# Patient Record
Sex: FEMALE | Race: WHITE | NOT HISPANIC OR LATINO | Employment: FULL TIME | URBAN - METROPOLITAN AREA
[De-identification: names, ages, dates, MRNs, and addresses within clinical notes are randomized per-mention and may not be internally consistent; named-entity substitution may affect disease eponyms.]

---

## 2017-01-12 ENCOUNTER — TELEPHONE (OUTPATIENT)
Dept: OBSTETRICS AND GYNECOLOGY | Facility: CLINIC | Age: 42
End: 2017-01-12

## 2017-03-05 DIAGNOSIS — G47.00 INSOMNIA: ICD-10-CM

## 2017-03-06 RX ORDER — ZOLPIDEM TARTRATE 5 MG/1
TABLET ORAL
Qty: 30 TABLET | Refills: 0 | OUTPATIENT
Start: 2017-03-06

## 2017-08-07 ENCOUNTER — OFFICE VISIT (OUTPATIENT)
Dept: INTERNAL MEDICINE | Facility: CLINIC | Age: 42
End: 2017-08-07
Attending: FAMILY MEDICINE
Payer: COMMERCIAL

## 2017-08-07 VITALS
HEIGHT: 67 IN | WEIGHT: 130.94 LBS | SYSTOLIC BLOOD PRESSURE: 100 MMHG | DIASTOLIC BLOOD PRESSURE: 72 MMHG | BODY MASS INDEX: 20.55 KG/M2 | HEART RATE: 88 BPM

## 2017-08-07 DIAGNOSIS — F07.81 POST CONCUSSION SYNDROME: ICD-10-CM

## 2017-08-07 DIAGNOSIS — F41.1 GAD (GENERALIZED ANXIETY DISORDER): ICD-10-CM

## 2017-08-07 DIAGNOSIS — N30.00 ACUTE CYSTITIS WITHOUT HEMATURIA: Primary | ICD-10-CM

## 2017-08-07 PROCEDURE — 87186 SC STD MICRODIL/AGAR DIL: CPT

## 2017-08-07 PROCEDURE — 99999 PR PBB SHADOW E&M-EST. PATIENT-LVL III: CPT | Mod: PBBFAC,,, | Performed by: FAMILY MEDICINE

## 2017-08-07 PROCEDURE — 87086 URINE CULTURE/COLONY COUNT: CPT

## 2017-08-07 PROCEDURE — 87088 URINE BACTERIA CULTURE: CPT

## 2017-08-07 PROCEDURE — 87077 CULTURE AEROBIC IDENTIFY: CPT

## 2017-08-07 PROCEDURE — 99213 OFFICE O/P EST LOW 20 MIN: CPT | Mod: S$GLB,,, | Performed by: FAMILY MEDICINE

## 2017-08-07 PROCEDURE — 3008F BODY MASS INDEX DOCD: CPT | Mod: S$GLB,,, | Performed by: FAMILY MEDICINE

## 2017-08-07 RX ORDER — ZOLPIDEM TARTRATE 5 MG/1
5 TABLET ORAL NIGHTLY PRN
Qty: 30 TABLET | Refills: 3 | Status: SHIPPED | OUTPATIENT
Start: 2017-08-07 | End: 2018-03-14 | Stop reason: SDUPTHER

## 2017-08-07 RX ORDER — SPIRONOLACTONE 50 MG/1
TABLET, FILM COATED ORAL
COMMUNITY
Start: 2017-07-16 | End: 2018-12-21

## 2017-08-07 RX ORDER — SPIRONOLACTONE 50 MG/1
50 TABLET, FILM COATED ORAL 3 TIMES DAILY
Qty: 90 TABLET | Refills: 0 | Status: CANCELLED | OUTPATIENT
Start: 2017-08-07

## 2017-08-07 RX ORDER — AMITRIPTYLINE HYDROCHLORIDE 50 MG/1
100 TABLET, FILM COATED ORAL NIGHTLY
Qty: 30 TABLET | Refills: 11 | Status: SHIPPED | OUTPATIENT
Start: 2017-08-07 | End: 2018-03-14 | Stop reason: SDUPTHER

## 2017-08-07 RX ORDER — NITROFURANTOIN 25; 75 MG/1; MG/1
100 CAPSULE ORAL 2 TIMES DAILY
Qty: 14 CAPSULE | Refills: 0 | Status: SHIPPED | OUTPATIENT
Start: 2017-08-07 | End: 2017-11-20

## 2017-08-07 RX ORDER — LORAZEPAM 0.5 MG/1
0.5 TABLET ORAL EVERY 12 HOURS PRN
Qty: 30 TABLET | Refills: 1 | Status: SHIPPED | OUTPATIENT
Start: 2017-08-07 | End: 2017-08-09 | Stop reason: SDUPTHER

## 2017-08-07 NOTE — PROGRESS NOTES
"CHIEF COMPLAINT: Several days of foul-smelling urine and dysuria     HISTORY OF PRESENT ILLNESS: The patient recently developed the subacute onset of foul-smelling urine and dysuria.  No back pain or hematuria.  No fever or chills.    REVIEW OF SYSTEMS:  GENERAL: No fever, chills, fatigability or weight loss.  SKIN: No rashes, itching or changes in color or texture of skin.  HEAD: The patient does not have headaches and recent head trauma.  EYES: Visual acuity fine. No photophobia, ocular pain or diplopia.  EARS: Denies ear pain, discharge or vertigo.  NOSE: No loss of smell, no epistaxis or postnasal drip.  MOUTH & THROAT: No hoarseness or change in voice. No excessive gum bleeding.  NODES: Denies swollen glands.  CHEST: Denies ROJO, cyanosis, wheezing, cough and sputum production.  CARDIOVASCULAR: Denies chest pain, PND, orthopnea or reduced exercise tolerance.  ABDOMEN: Appetite fine. No weight loss. Denies diarrhea, abdominal pain, hematemesis or blood in stool.  The patient is having some nausea and occasional vomiting.  URINARY: No flank pain or hematuria.  PERIPHERAL VASCULAR: No claudication or cyanosis.  MUSCULOSKELETAL: No joint stiffness or swelling. Denies back pain.Except as noted above.  NEUROLOGIC: No history of seizures, paralysis, alteration of gait or coordination.    SOCIAL HISTORY: Unchanged since recent note by primary care physician.    PHYSICAL EXAMINATION:   Blood pressure 100/72, pulse 88, height 5' 7" (1.702 m), weight 59.4 kg (130 lb 15.3 oz).      APPEARANCE: Well nourished, well developed, in no acute distress.    HEAD: Normocephalic, atraumatic.  EYES: PERRL. EOMI.  Conjunctivae without injection and  anicteric  EARS: TM's intact. Light reflex normal. No retraction or perforation.    NOSE: Mucosa pink. Airway clear.  MOUTH & THROAT: No tonsillar enlargement. No pharyngeal erythema or exudate. No stridor.  NECK: Supple.   NODES: No cervical, axillary or inguinal lymph node " enlargement.  ABDOMEN: Bowel sounds normal. Not distended. Soft. No tenderness or masses.  No ascites is noted.  MUSCULOSKELETAL:  There is no clubbing, cyanosis, or edema of the extremities x4.  There is full range of motion of the lumbar spine.  There is full range of motion of the extremities x4.  There is no deformity noted.    NEUROLOGIC:       [place] Coma Scale 15      Normal speech development.      Hearing normal.      Normal gait.      Motor and sensory exams grossly normal.      DTR's normal.  PSYCHIATRIC: Patient is alert and oriented x3.  Thought processes are all normal.  There is no homicidality.  There is no suicidality.  There is no evidence of psychosis.    LABORATORY/RADIOLOGY:Chart reviewed from the emergency room..    ASSESSMENT:   UTI    PLAN:    urine culture sent    Macrobid for 7 days

## 2017-08-09 DIAGNOSIS — F41.1 GAD (GENERALIZED ANXIETY DISORDER): ICD-10-CM

## 2017-08-09 LAB — BACTERIA UR CULT: NORMAL

## 2017-08-09 RX ORDER — LORAZEPAM 0.5 MG/1
0.5 TABLET ORAL EVERY 12 HOURS PRN
Qty: 30 TABLET | Refills: 1 | Status: SHIPPED | OUTPATIENT
Start: 2017-08-09 | End: 2018-03-19

## 2017-08-09 NOTE — TELEPHONE ENCOUNTER
Pt rx for ativan has 2 sets of directions. Please clarify how pt should be taking rx. Please advise/authorize?

## 2017-08-09 NOTE — TELEPHONE ENCOUNTER
"----- Message from Samara Waltersin sent at 8/9/2017 10:17 AM CDT -----  Contact: [facility] # [id number]  X  1st Request  _  2nd Request  _  3rd Request    Who: Lynne Velasco (mrn# [id number])    Why: Pharmacist called requesting a call.  Says, "directions aren't clear."  Medication / LORAZEPAM.  Please give a call back at your earliest convenience.        THANKS!    What Number to Call Back:  [phone]    When to Expect a call back: (With in 24 hours)                      "

## 2017-11-17 DIAGNOSIS — F41.1 GAD (GENERALIZED ANXIETY DISORDER): ICD-10-CM

## 2017-11-17 RX ORDER — LORAZEPAM 0.5 MG/1
0.5 TABLET ORAL EVERY 12 HOURS PRN
Qty: 30 TABLET | Refills: 1 | Status: CANCELLED | OUTPATIENT
Start: 2017-11-17

## 2017-11-17 RX ORDER — ZOLPIDEM TARTRATE 5 MG/1
5 TABLET ORAL NIGHTLY PRN
Qty: 30 TABLET | Refills: 3 | Status: CANCELLED | OUTPATIENT
Start: 2017-11-17

## 2017-11-20 ENCOUNTER — OFFICE VISIT (OUTPATIENT)
Dept: OBSTETRICS AND GYNECOLOGY | Facility: CLINIC | Age: 42
End: 2017-11-20
Attending: OBSTETRICS & GYNECOLOGY
Payer: COMMERCIAL

## 2017-11-20 VITALS
BODY MASS INDEX: 21.38 KG/M2 | HEIGHT: 67 IN | DIASTOLIC BLOOD PRESSURE: 70 MMHG | WEIGHT: 136.25 LBS | SYSTOLIC BLOOD PRESSURE: 92 MMHG

## 2017-11-20 DIAGNOSIS — Z12.31 ENCOUNTER FOR SCREENING MAMMOGRAM FOR HIGH-RISK PATIENT: ICD-10-CM

## 2017-11-20 DIAGNOSIS — R35.0 URINARY FREQUENCY: ICD-10-CM

## 2017-11-20 DIAGNOSIS — Z01.419 ENCOUNTER FOR GYNECOLOGICAL EXAMINATION WITHOUT ABNORMAL FINDING: Primary | ICD-10-CM

## 2017-11-20 LAB
BACTERIA #/AREA URNS AUTO: NORMAL /HPF
BILIRUB UR QL STRIP: NEGATIVE
CLARITY UR REFRACT.AUTO: ABNORMAL
COLOR UR AUTO: YELLOW
GLUCOSE UR QL STRIP: NEGATIVE
HGB UR QL STRIP: NEGATIVE
KETONES UR QL STRIP: ABNORMAL
LEUKOCYTE ESTERASE UR QL STRIP: NEGATIVE
MICROSCOPIC COMMENT: NORMAL
NITRITE UR QL STRIP: POSITIVE
PH UR STRIP: 5 [PH] (ref 5–8)
PROT UR QL STRIP: NEGATIVE
RBC #/AREA URNS AUTO: 1 /HPF (ref 0–4)
SP GR UR STRIP: 1.02 (ref 1–1.03)
SQUAMOUS #/AREA URNS AUTO: 1 /HPF
URN SPEC COLLECT METH UR: ABNORMAL
UROBILINOGEN UR STRIP-ACNC: NEGATIVE EU/DL
WBC #/AREA URNS AUTO: 4 /HPF (ref 0–5)

## 2017-11-20 PROCEDURE — 87086 URINE CULTURE/COLONY COUNT: CPT

## 2017-11-20 PROCEDURE — 87186 SC STD MICRODIL/AGAR DIL: CPT

## 2017-11-20 PROCEDURE — 87077 CULTURE AEROBIC IDENTIFY: CPT

## 2017-11-20 PROCEDURE — 99396 PREV VISIT EST AGE 40-64: CPT | Mod: S$GLB,,, | Performed by: OBSTETRICS & GYNECOLOGY

## 2017-11-20 PROCEDURE — 87088 URINE BACTERIA CULTURE: CPT

## 2017-11-20 PROCEDURE — 99999 PR PBB SHADOW E&M-EST. PATIENT-LVL III: CPT | Mod: PBBFAC,,, | Performed by: OBSTETRICS & GYNECOLOGY

## 2017-11-20 PROCEDURE — 81001 URINALYSIS AUTO W/SCOPE: CPT

## 2017-11-20 NOTE — PROGRESS NOTES
Subjective:       Patient ID: Lynne Velasco is a 42 y.o. female.    Chief Complaint:  Gynecologic Exam and Dysuria (urinary frequency)      History of Present Illness  HPI    Lynne Velasco is a 42 y.o. female  here for her annual GYN exam.  She reports urinary frequency for the past seven months with difficulty emptying completely.    She describes her periods as irregular, light flow, lasting 2 days.   Denies break through bleeding.   Denies vaginal itching or irritation.  Denies vaginal discharge.  She is sexually active. She has had 1 partner for the past 18 years .  She uses vasectomy for contraception.   History of abnormal pap: Yes -   Last Pap: approximate date  and was normal  Last MMG: normal--routine follow-up in 12 months  Denies domestic violence. She does feel safe at home.     Past Medical History:   Diagnosis Date    Abnormal Pap smear     cryo for dysplasia    Anxiety     Depression     History of psychiatric care     prozac, lexapro, ativan    Mental disorder     Depression    Psychiatric problem     Therapy      Past Surgical History:   Procedure Laterality Date    Breast implants 1998    Cryo       DILATION AND CURETTAGE OF UTERUS      DILATION AND CURETTAGE OF UTERUS      ENDOMETRIAL ABLATION      Hysteroscopy with Novasure endometrial ablation     Social History     Social History    Marital status:      Spouse name: N/A    Number of children: N/A    Years of education: N/A     Occupational History     [employer]     Social History Main Topics    Smoking status: Former Smoker     Packs/day: 0.50     Years: 5.00     Types: Cigarettes     Quit date: 10/15/1999    Smokeless tobacco: Never Used    Alcohol use 1.2 oz/week     2 Glasses of wine per week      Comment: social    Drug use: No    Sexual activity: Yes     Partners: Male     Birth control/ protection: Surgical, None      Comment: Spouse had Vasectomy,  " since      Other Topics Concern    Not on file     Social History Narrative    - 14 years    From [place]    Works for a law firm- - associates degree    3 children     close with mom     Family History   Problem Relation Age of Onset    Diabetes Father     Stroke Father     Diabetes Mother     Hypertension Mother     Coronary artery disease Mother     Depression Mother     Seizures Mother     Ovarian cancer Maternal Aunt     Breast cancer Maternal Aunt     Colon cancer Neg Hx     Suicide Neg Hx      OB History      Para Term  AB Living    4 3 3   1 3    SAB TAB Ectopic Multiple Live Births    1       3          BP 92/70   Ht 5' 7" (1.702 m)   Wt 61.8 kg (136 lb 3.9 oz)   LMP 10/09/2017 (Approximate)   BMI 21.34 kg/m²         GYN & OB History  Patient's last menstrual period was 10/09/2017 (approximate).   Date of Last Pap: 2016    OB History    Para Term  AB Living   4 3 3   1 3   SAB TAB Ectopic Multiple Live Births   1       3      # Outcome Date GA Lbr Homero/2nd Weight Sex Delivery Anes PTL Lv   4 SAB            3 Term    3.402 kg (7 lb 8 oz) F Vag-Spont   FATOU   2 Term    3.402 kg (7 lb 8 oz) M Vag-Spont   FATOU   1 Term    3.09 kg (6 lb 13 oz) F Vag-Spont   FATOU          Review of Systems  Review of Systems   Constitutional: Negative for activity change, appetite change, fatigue and unexpected weight change.   HENT: Negative.    Eyes: Negative for visual disturbance.   Respiratory: Negative for shortness of breath and wheezing.    Cardiovascular: Negative for chest pain, palpitations and leg swelling.   Gastrointestinal: Negative for abdominal pain, bloating and blood in stool.   Endocrine: Negative for diabetes and hair loss.   Genitourinary: Positive for frequency, urgency and dysmenorrhea. Negative for decreased libido, dyspareunia and menstrual problem.   Musculoskeletal: Negative for back pain and joint swelling.   Skin:  Negative for no " acne and hair changes.   Neurological: Negative for headaches.   Hematological: Does not bruise/bleed easily.   Psychiatric/Behavioral: Negative for depression and sleep disturbance. The patient is not nervous/anxious.    Breast: Negative for breast pain and nipple discharge          Objective:    Physical Exam:   Constitutional: She is oriented to person, place, and time. She appears well-developed and well-nourished.    HENT:   Head: Normocephalic and atraumatic.    Eyes: EOM are normal. Pupils are equal, round, and reactive to light.    Neck: Normal range of motion. Neck supple.    Cardiovascular: Normal rate and regular rhythm.     Pulmonary/Chest: Effort normal and breath sounds normal.   BREASTS: Symmetrical, no skin changes or visible lesions.  No palpable masses, nipple discharge bilaterally. Implants bilaterally          Abdominal: Soft. Bowel sounds are normal.     Genitourinary: Pelvic exam was performed with patient supine.   Genitourinary Comments: PELVIC: Normal external genitalia without lesions.  Normal hair distribution.  Adequate perineal body, normal urethral meatus.  Vagina moist and well rugated without lesions or discharge.  Cervix pink,PAROUS, PATULOUS,without lesions, discharge or tenderness.  No significant cystocele or rectocele.  Bimanual exam shows uterus to be UPPER LIMITS  normal size, regular, mobile and nontender.  Adnexa without masses or tenderness.               Musculoskeletal: Normal range of motion and moves all extremeties.       Neurological: She is alert and oriented to person, place, and time.    Skin: Skin is warm and dry.    Psychiatric: She has a normal mood and affect.          Assessment:        1. Encounter for gynecological examination without abnormal finding    2. Encounter for screening mammogram for high-risk patient    3. Urinary frequency                Plan:        1. Encounter for gynecological examination without abnormal finding  COUNSELING:  The patient was  counseled today on osteoporosis prevention, calcium supplementation, and regular weight bearing exercise. The patient was also counseled today on ACS PAP guidelines, with recommendations for yearly pelvic exams unless their uterus, cervix, and ovaries were removed for benign reasons; in that case, examinations every 3-5 years are recommended. The patient was also counseled regarding monthly breast self-examination, routine STD screening for at-risk populations, prophylactic immunizations for transmitted infections such as HPV, Pertussis, or Influenza as appropriate, and yearly mammograms when indicated by ACS guidelines. She was advised to see her primary care physician for all other health maintenance.   FOLLOW-UP with me for next routine visit.         2. Encounter for screening mammogram for high-risk patient    - Mammo Digital Screening Bilat with [facility]os[facility] CAD; Future    3. Urinary frequency  - Urine culture  - Urinalysis  - Ambulatory consult to Urogynecology       Return in about 1 year (around 11/20/2018).

## 2017-11-21 ENCOUNTER — TELEPHONE (OUTPATIENT)
Dept: UROGYNECOLOGY | Facility: CLINIC | Age: 42
End: 2017-11-21

## 2017-11-21 DIAGNOSIS — N39.0 UTI (URINARY TRACT INFECTION), UNCOMPLICATED: Primary | ICD-10-CM

## 2017-11-21 RX ORDER — NITROFURANTOIN 25; 75 MG/1; MG/1
100 CAPSULE ORAL 2 TIMES DAILY
Qty: 14 CAPSULE | Refills: 0 | Status: SHIPPED | OUTPATIENT
Start: 2017-11-21 | End: 2017-11-28

## 2017-11-21 RX ORDER — PHENAZOPYRIDINE HYDROCHLORIDE 200 MG/1
200 TABLET, FILM COATED ORAL 3 TIMES DAILY PRN
Qty: 6 TABLET | Refills: 0 | Status: SHIPPED | OUTPATIENT
Start: 2017-11-21 | End: 2017-11-23

## 2017-11-21 NOTE — TELEPHONE ENCOUNTER
----- Message from Kamla Chavez MD sent at 11/20/2017  5:12 PM CST -----  Please schedule for consult - has urinary frequency with incomplete emptying.

## 2017-11-22 LAB — BACTERIA UR CULT: NORMAL

## 2017-12-05 ENCOUNTER — TELEPHONE (OUTPATIENT)
Dept: UROGYNECOLOGY | Facility: CLINIC | Age: 42
End: 2017-12-05

## 2018-03-14 DIAGNOSIS — F51.01 PRIMARY INSOMNIA: Primary | ICD-10-CM

## 2018-03-14 DIAGNOSIS — F07.81 POST CONCUSSION SYNDROME: ICD-10-CM

## 2018-03-15 ENCOUNTER — PATIENT MESSAGE (OUTPATIENT)
Dept: INTERNAL MEDICINE | Facility: CLINIC | Age: 43
End: 2018-03-15

## 2018-03-15 RX ORDER — AMITRIPTYLINE HYDROCHLORIDE 50 MG/1
100 TABLET, FILM COATED ORAL NIGHTLY
Qty: 30 TABLET | Refills: 11 | Status: SHIPPED | OUTPATIENT
Start: 2018-03-15 | End: 2018-03-19

## 2018-03-15 RX ORDER — ZOLPIDEM TARTRATE 5 MG/1
5 TABLET ORAL NIGHTLY PRN
Qty: 30 TABLET | Refills: 3 | Status: SHIPPED | OUTPATIENT
Start: 2018-03-15 | End: 2018-12-21

## 2018-03-19 ENCOUNTER — OFFICE VISIT (OUTPATIENT)
Dept: INTERNAL MEDICINE | Facility: CLINIC | Age: 43
End: 2018-03-19
Attending: FAMILY MEDICINE
Payer: COMMERCIAL

## 2018-03-19 VITALS
OXYGEN SATURATION: 96 % | DIASTOLIC BLOOD PRESSURE: 72 MMHG | HEART RATE: 60 BPM | HEIGHT: 67 IN | WEIGHT: 135.13 LBS | BODY MASS INDEX: 21.21 KG/M2 | SYSTOLIC BLOOD PRESSURE: 110 MMHG

## 2018-03-19 DIAGNOSIS — Z00.00 PREVENTATIVE HEALTH CARE: Primary | ICD-10-CM

## 2018-03-19 DIAGNOSIS — F51.01 PRIMARY INSOMNIA: ICD-10-CM

## 2018-03-19 PROCEDURE — 99396 PREV VISIT EST AGE 40-64: CPT | Mod: S$GLB,,, | Performed by: FAMILY MEDICINE

## 2018-03-19 PROCEDURE — 99999 PR PBB SHADOW E&M-EST. PATIENT-LVL III: CPT | Mod: PBBFAC,,, | Performed by: FAMILY MEDICINE

## 2018-03-19 NOTE — PROGRESS NOTES
CHIEF COMPLAINT: Annual exam    HISTORY OF PRESENT ILLNESS: The patient is a very pleasant 42-year-old white female.  She presents to discuss completing paperwork for a concealed handgun permit.  We had an extensive discussion and chart review today.  She is on Aldactone for adult acne.  She rarely uses Ambien for sleep.  Perhaps 5 or so nights a month she uses Ambien.  She was prescribed Ativan in the distant past.  I find no evidence in the  that she has filled it in the last 2 years.  She was seen by psychiatry at one point for some reactive depression after her father's passing away.  She has not had any sort of follow-up for issues regarding that in a couple of years.  Overall I find no evidence of residual anxiety depression or other mental illness.  She is responsible in the use of her Ambien for insomnia.      REVIEW OF SYSTEMS:  GENERAL: No fever, chills, fatigability or weight loss.  SKIN: No rashes, itching or changes in color or texture of skin.  HEAD: The patient does not have headaches and recent head trauma.  EYES: Visual acuity fine. No photophobia, ocular pain or diplopia.  EARS: Denies ear pain, discharge or vertigo.  NOSE: No loss of smell, no epistaxis or postnasal drip.  MOUTH & THROAT: No hoarseness or change in voice. No excessive gum bleeding.  NODES: Denies swollen glands.  CHEST: Denies ROJO, cyanosis, wheezing, cough and sputum production.  CARDIOVASCULAR: Denies chest pain, PND, orthopnea or reduced exercise tolerance.  ABDOMEN: Appetite fine. No weight loss. Denies diarrhea, abdominal pain, hematemesis or blood in stool.    URINARY: No flank pain or hematuria.  PERIPHERAL VASCULAR: No claudication or cyanosis.  MUSCULOSKELETAL: No joint stiffness or swelling. Denies back pain.  NEUROLOGIC: No history of seizures, paralysis, alteration of gait or coordination.    SOCIAL HISTORY: Unchanged since recent note by primary care physician.    PHYSICAL EXAMINATION:   Blood pressure 110/72,  "pulse 60, height 5' 7" (1.702 m), weight 61.3 kg (135 lb 2.3 oz), SpO2 96 %.      APPEARANCE: Well nourished, well developed, in no acute distress.    HEAD: Normocephalic, atraumatic.  EYES: PERRL. EOMI.  Conjunctivae without injection and  anicteric  EARS: TM's intact. Light reflex normal. No retraction or perforation.    NOSE: Mucosa pink. Airway clear.  MOUTH & THROAT: No tonsillar enlargement. No pharyngeal erythema or exudate. No stridor.  NECK: Supple.   NODES: No cervical, axillary or inguinal lymph node enlargement.  ABDOMEN: Bowel sounds normal. Not distended. Soft. No tenderness or masses.  No ascites is noted.  MUSCULOSKELETAL:  There is no clubbing, cyanosis, or edema of the extremities x4.  There is full range of motion of the lumbar spine.  There is full range of motion of the extremities x4.  There is no deformity noted.    NEUROLOGIC:       [place] Coma Scale 15      Normal speech development.      Hearing normal.      Normal gait.      Motor and sensory exams grossly normal.      DTR's normal.  PSYCHIATRIC: Patient is alert and oriented x3.  Thought processes are all normal.  There is no homicidality.  There is no suicidality.  There is no evidence of psychosis.    LABORATORY/RADIOLOGY:Chart reviewed from the emergency room..    ASSESSMENT:   Annual exam  Depression and anxiety, resolved  Insomnia, mild    PLAN:   Overall I see no reason that she cannot be cleared medically for her concealed handgun permit.     her blood work is up-to-date.      "

## 2018-11-26 ENCOUNTER — HOSPITAL ENCOUNTER (OUTPATIENT)
Dept: RADIOLOGY | Facility: OTHER | Age: 43
Discharge: HOME | End: 2018-11-26
Attending: OBSTETRICS & GYNECOLOGY
Payer: COMMERCIAL

## 2018-11-26 ENCOUNTER — OFFICE VISIT (OUTPATIENT)
Dept: OBSTETRICS AND GYNECOLOGY | Facility: CLINIC | Age: 43
End: 2018-11-26
Attending: OBSTETRICS & GYNECOLOGY
Payer: COMMERCIAL

## 2018-11-26 VITALS
HEIGHT: 67 IN | DIASTOLIC BLOOD PRESSURE: 70 MMHG | SYSTOLIC BLOOD PRESSURE: 108 MMHG | BODY MASS INDEX: 21.35 KG/M2 | WEIGHT: 136 LBS

## 2018-11-26 VITALS — HEIGHT: 67 IN | BODY MASS INDEX: 21.35 KG/M2 | WEIGHT: 136 LBS

## 2018-11-26 DIAGNOSIS — R35.0 URINARY FREQUENCY: ICD-10-CM

## 2018-11-26 DIAGNOSIS — N94.10 FEMALE DYSPAREUNIA: ICD-10-CM

## 2018-11-26 DIAGNOSIS — Z12.4 PAP SMEAR FOR CERVICAL CANCER SCREENING: ICD-10-CM

## 2018-11-26 DIAGNOSIS — Z01.419 ENCOUNTER FOR GYNECOLOGICAL EXAMINATION WITHOUT ABNORMAL FINDING: Primary | ICD-10-CM

## 2018-11-26 DIAGNOSIS — Z12.31 SCREENING MAMMOGRAM, ENCOUNTER FOR: ICD-10-CM

## 2018-11-26 DIAGNOSIS — Z12.31 ENCOUNTER FOR SCREENING MAMMOGRAM FOR HIGH-RISK PATIENT: ICD-10-CM

## 2018-11-26 LAB
BACTERIA #/AREA URNS AUTO: ABNORMAL /HPF
BILIRUB UR QL STRIP: NEGATIVE
CLARITY UR REFRACT.AUTO: ABNORMAL
COLOR UR AUTO: YELLOW
GLUCOSE UR QL STRIP: NEGATIVE
HGB UR QL STRIP: NEGATIVE
KETONES UR QL STRIP: NEGATIVE
LEUKOCYTE ESTERASE UR QL STRIP: NEGATIVE
MICROSCOPIC COMMENT: ABNORMAL
NITRITE UR QL STRIP: NEGATIVE
PH UR STRIP: 6 [PH] (ref 5–8)
PROT UR QL STRIP: NEGATIVE
RBC #/AREA URNS AUTO: 1 /HPF (ref 0–4)
SP GR UR STRIP: 1.02 (ref 1–1.03)
SQUAMOUS #/AREA URNS AUTO: 2 /HPF
URN SPEC COLLECT METH UR: ABNORMAL
WBC #/AREA URNS AUTO: 4 /HPF (ref 0–5)

## 2018-11-26 PROCEDURE — 99396 PREV VISIT EST AGE 40-64: CPT | Mod: S$GLB,,, | Performed by: OBSTETRICS & GYNECOLOGY

## 2018-11-26 PROCEDURE — 77067 SCR MAMMO BI INCL CAD: CPT | Mod: 26,,, | Performed by: RADIOLOGY

## 2018-11-26 PROCEDURE — 87186 SC STD MICRODIL/AGAR DIL: CPT

## 2018-11-26 PROCEDURE — 87077 CULTURE AEROBIC IDENTIFY: CPT

## 2018-11-26 PROCEDURE — 87088 URINE BACTERIA CULTURE: CPT

## 2018-11-26 PROCEDURE — 99999 PR PBB SHADOW E&M-EST. PATIENT-LVL III: CPT | Mod: PBBFAC,,, | Performed by: OBSTETRICS & GYNECOLOGY

## 2018-11-26 PROCEDURE — 88141 CYTOPATH C/V INTERPRET: CPT | Mod: ,,, | Performed by: PATHOLOGY

## 2018-11-26 PROCEDURE — 87624 HPV HI-RISK TYP POOLED RSLT: CPT

## 2018-11-26 PROCEDURE — 77067 SCR MAMMO BI INCL CAD: CPT | Mod: TC

## 2018-11-26 PROCEDURE — 81001 URINALYSIS AUTO W/SCOPE: CPT

## 2018-11-26 PROCEDURE — 77063 BREAST TOMOSYNTHESIS BI: CPT | Mod: 26,,, | Performed by: RADIOLOGY

## 2018-11-26 PROCEDURE — 88175 CYTOPATH C/V AUTO FLUID REDO: CPT | Performed by: PATHOLOGY

## 2018-11-26 PROCEDURE — 87086 URINE CULTURE/COLONY COUNT: CPT

## 2018-11-26 PROCEDURE — 77063 BREAST TOMOSYNTHESIS BI: CPT | Mod: TC

## 2018-11-26 RX ORDER — MELATONIN 10 MG
CAPSULE ORAL
COMMUNITY
End: 2024-03-26

## 2018-11-26 NOTE — PROGRESS NOTES
Subjective:       Patient ID: Lynne Velasco is a 43 y.o. female.    Chief Complaint:  Well Woman and Urinary Frequency      History of Present Illness  HPI    Lynne Velasco is a 43 y.o. female  here for her annual GYN exam.  She also reports urinary frequency for several months with some difficulty with complete emptying and reports a strong urinary odor..   She describes her periods as regular, light flow, lasting 5 days. (Has occasional intermenstrual spotting).  Reports break through bleeding.   denies vaginal itching or irritation.  Denies vaginal discharge.  She is sexually active. She has had 1 partner for the past 19 years .  She uses vasectomy for contraception.   History of abnormal pap: Yes - > 20 years ago  Last Pap: approximate date  and was normal  Last MMG: normal--routine follow-up in 12 months  Last Colonoscopy:  None  denies domestic violence. She does feel safe at home.     Past Medical History:   Diagnosis Date    Abnormal Pap smear     cryo for dysplasia    Anxiety     Depression     History of psychiatric care     prozac, lexapro, ativan    Mental disorder     Depression    Psychiatric problem     Therapy      Past Surgical History:   Procedure Laterality Date    Breast implants 1998    Cryo       DILATION AND CURETTAGE OF UTERUS      DILATION AND CURETTAGE OF UTERUS      ENDOMETRIAL ABLATION      Hysteroscopy with Novasure endometrial ablation    Novasure ablation with D&C Hysteroscopy N/A 2014    Performed by Kamla Chavez MD at [facility]     Social History     Socioeconomic History    Marital status:      Spouse name: Not on file    Number of children: Not on file    Years of education: Not on file    Highest education level: Not on file   Social Needs    Financial resource strain: Not on file    Food insecurity - worry: Not on file    Food insecurity - inability: Not on file     "Transportation needs - medical: Not on file    Transportation needs - non-medical: Not on file   Occupational History     Employer: [employer]   Tobacco Use    Smoking status: Former Smoker     Packs/day: 0.50     Years: 5.00     Pack years: 2.50     Types: Cigarettes     Last attempt to quit: 10/15/1999     Years since quittin.1    Smokeless tobacco: Never Used   Substance and Sexual Activity    Alcohol use: Yes     Alcohol/week: 1.2 oz     Types: 2 Glasses of wine per week     Comment: social    Drug use: No    Sexual activity: Yes     Partners: Male     Birth control/protection: Surgical, None     Comment: Spouse had Vasectomy,  since    Other Topics Concern    Patient feels they ought to cut down on drinking/drug use Not Asked    Patient annoyed by others criticizing their drinking/drug use Not Asked    Patient has felt bad or guilty about drinking/drug use Not Asked    Patient has had a drink/used drugs as an eye opener in the AM Not Asked   Social History Narrative    - 14 years    From [place]    Works for a law firm- - associates degree    3 children     close with mom     Family History   Problem Relation Age of Onset    Diabetes Father     Stroke Father     Diabetes Mother     Hypertension Mother     Coronary artery disease Mother     Depression Mother     Seizures Mother     Ovarian cancer Maternal Aunt     Breast cancer Maternal Aunt     Colon cancer Neg Hx     Suicide Neg Hx      OB History      Para Term  AB Living    4 3 3   1 3    SAB TAB Ectopic Multiple Live Births    1       3          /70 (BP Location: Left arm)   Ht 5' 7" (1.702 m)   Wt 61.7 kg (136 lb 0.4 oz)   LMP 2018 (Exact Date)   BMI 21.30 kg/m²         GYN & OB History  Patient's last menstrual period was 2018 (exact date).   Date of Last Pap: 2016    OB History    Para Term  AB Living   4 3 3   1 3   SAB TAB Ectopic Multiple Live Births   1 "       3      # Outcome Date GA Lbr Homero/2nd Weight Sex Delivery Anes PTL Lv   4 SAB            3 Term    3.402 kg (7 lb 8 oz) F Vag-Spont   FATOU   2 Term    3.402 kg (7 lb 8 oz) M Vag-Spont   FATOU   1 Term    3.09 kg (6 lb 13 oz) F Vag-Spont   FATOU          Review of Systems  Review of Systems   Constitutional: Negative for activity change, appetite change, fatigue and unexpected weight change.   HENT: Negative.    Eyes: Negative for visual disturbance.   Respiratory: Negative for shortness of breath and wheezing.    Cardiovascular: Negative for chest pain, palpitations and leg swelling.   Gastrointestinal: Negative for abdominal pain, bloating and blood in stool.   Endocrine: Negative for diabetes and hair loss.   Genitourinary: Positive for dyspareunia and frequency. Negative for decreased libido.   Musculoskeletal: Negative for back pain and joint swelling.   Neurological: Negative for headaches.   Hematological: Does not bruise/bleed easily.   Psychiatric/Behavioral: Negative for depression and sleep disturbance. The patient is not nervous/anxious.    Breast: Negative for breast pain and nipple discharge          Objective:      Physical Exam:   Constitutional: She is oriented to person, place, and time. She appears well-developed and well-nourished.    HENT:   Head: Normocephalic and atraumatic.    Eyes: EOM are normal. Pupils are equal, round, and reactive to light.    Neck: Normal range of motion. Neck supple.    Cardiovascular: Normal rate and regular rhythm.     Pulmonary/Chest: Effort normal and breath sounds normal.   BREASTS:  no mass, no tenderness, no deformity and no retraction. Right breast exhibits no inverted nipple, no mass, no nipple discharge, no skin change, no tenderness, no bleeding and no swelling. Left breast exhibits no inverted nipple, no mass, no nipple discharge, no skin change, no tenderness, no bleeding and no swelling. Breasts are symmetrical.      Implants bilaterally.        Abdominal:  Soft. Bowel sounds are normal.     Genitourinary: Pelvic exam was performed with patient supine.   Genitourinary Comments: PELVIC: Normal external genitalia without lesions.  Normal hair distribution.  Adequate perineal body, normal urethral meatus.  Vagina moist and well rugated without lesions or discharge.  Cervix pink, PAROUS, without lesions, discharge or tenderness.  No significant cystocele or rectocele.  Bimanual exam shows uterus to be 8-10 weeks size, regular, ANTEFLEXED,mobile and nontender.  Adnexa without masses or tenderness.               Musculoskeletal: Normal range of motion and moves all extremeties.       Neurological: She is alert and oriented to person, place, and time.    Skin: Skin is warm and dry.    Psychiatric: She has a normal mood and affect.              Assessment:        1. Encounter for gynecological examination without abnormal finding    2. Pap smear for cervical cancer screening    3. Screening mammogram, encounter for    4. Urinary frequency    5. Female dyspareunia                Plan:        1. Encounter for gynecological examination without abnormal finding  COUNSELING:  The patient was counseled today on osteoporosis prevention, calcium supplementation, and regular weight bearing exercise. The patient was also counseled today on ACS PAP guidelines, with recommendations for yearly pelvic exams unless their uterus, cervix, and ovaries were removed for benign reasons; in that case, examinations every 3-5 years are recommended. The patient was also counseled regarding monthly breast self-examination, routine STD screening for at-risk populations, prophylactic immunizations for transmitted infections such as HPV, Pertussis, or Influenza as appropriate, and yearly mammograms when indicated by ACS guidelines. She was advised to see her primary care physician for all other health maintenance.   FOLLOW-UP with me for next routine visit.         2. Pap smear for cervical cancer  screening    - Liquid-based pap smear, screening  - HPV High Risk Genotypes, PCR    3. Screening mammogram, encounter for    - Mammo Digital Screening Bilat w/ Pablo; Future    4. Urinary frequency    - Urinalysis  - Urine culture  Consider consult with Urogyn  5. Female dyspareunia    - US Pelvis Comp with Transvag NON-OB (xpd; Future       Follow-up in about 1 year (around 11/26/2019).

## 2018-11-27 DIAGNOSIS — N39.0 UTI (URINARY TRACT INFECTION), UNCOMPLICATED: Primary | ICD-10-CM

## 2018-11-27 RX ORDER — NITROFURANTOIN 25; 75 MG/1; MG/1
100 CAPSULE ORAL 2 TIMES DAILY
Qty: 14 CAPSULE | Refills: 0 | Status: SHIPPED | OUTPATIENT
Start: 2018-11-27 | End: 2018-12-04

## 2018-11-28 ENCOUNTER — TELEPHONE (OUTPATIENT)
Dept: OBSTETRICS AND GYNECOLOGY | Facility: CLINIC | Age: 43
End: 2018-11-28

## 2018-11-28 DIAGNOSIS — N39.0 RECURRENT UTI: Primary | ICD-10-CM

## 2018-11-28 LAB — BACTERIA UR CULT: NORMAL

## 2018-11-29 LAB
HPV HR 12 DNA CVX QL NAA+PROBE: NEGATIVE
HPV16 AG SPEC QL: NEGATIVE
HPV18 DNA SPEC QL NAA+PROBE: NEGATIVE

## 2018-12-03 ENCOUNTER — HOSPITAL ENCOUNTER (OUTPATIENT)
Dept: RADIOLOGY | Facility: OTHER | Age: 43
Discharge: HOME | End: 2018-12-03
Attending: OBSTETRICS & GYNECOLOGY
Payer: COMMERCIAL

## 2018-12-03 DIAGNOSIS — N94.10 FEMALE DYSPAREUNIA: ICD-10-CM

## 2018-12-03 PROCEDURE — 76856 US EXAM PELVIC COMPLETE: CPT | Mod: TC

## 2018-12-03 PROCEDURE — 76830 TRANSVAGINAL US NON-OB: CPT | Mod: 26,,, | Performed by: RADIOLOGY

## 2018-12-03 PROCEDURE — 76856 US EXAM PELVIC COMPLETE: CPT | Mod: 26,,, | Performed by: RADIOLOGY

## 2018-12-06 ENCOUNTER — TELEPHONE (OUTPATIENT)
Dept: OBSTETRICS AND GYNECOLOGY | Facility: CLINIC | Age: 43
End: 2018-12-06

## 2018-12-06 NOTE — TELEPHONE ENCOUNTER
----- Message from oR Mccain sent at 12/5/2018  9:04 AM CST -----  Contact: pt   Name of Who is Calling: OBED RIVERA [[id number]]       What is the request in detail:   Patient is requesting a call in regards to ultrasound results and she states she has some other issues she needs to discuss.....Please contact to further discuss and advise.       Can the clinic reply by [facility]: yes       [facility]: [phone]

## 2018-12-07 NOTE — TELEPHONE ENCOUNTER
----- Message from Kamla Chavez MD sent at 11/28/2018  2:36 PM CST -----  Please schedule her with Urogyn, consult orders placed.

## 2018-12-13 ENCOUNTER — TELEPHONE (OUTPATIENT)
Dept: UROGYNECOLOGY | Facility: CLINIC | Age: 43
End: 2018-12-13

## 2018-12-13 NOTE — TELEPHONE ENCOUNTER
L/M to call us back:     Pt. Had an appt. Today at 8 a m and she is not here yet, want to know if she is running late, or would she like to reschedule.

## 2018-12-21 ENCOUNTER — INITIAL CONSULT (OUTPATIENT)
Dept: UROGYNECOLOGY | Facility: CLINIC | Age: 43
End: 2018-12-21
Payer: COMMERCIAL

## 2018-12-21 VITALS
SYSTOLIC BLOOD PRESSURE: 110 MMHG | WEIGHT: 137.81 LBS | BODY MASS INDEX: 21.63 KG/M2 | DIASTOLIC BLOOD PRESSURE: 70 MMHG | HEIGHT: 67 IN

## 2018-12-21 DIAGNOSIS — R39.15 URINARY URGENCY: Primary | ICD-10-CM

## 2018-12-21 DIAGNOSIS — N39.0 FREQUENT UTI: ICD-10-CM

## 2018-12-21 LAB
AMORPH CRY UR QL COMP ASSIST: ABNORMAL
BACTERIA #/AREA URNS AUTO: ABNORMAL /HPF
MICROSCOPIC COMMENT: ABNORMAL
RBC #/AREA URNS AUTO: 0 /HPF (ref 0–4)
SQUAMOUS #/AREA URNS AUTO: 5 /HPF
WBC #/AREA URNS AUTO: 0 /HPF (ref 0–5)

## 2018-12-21 PROCEDURE — 51701 INSERT BLADDER CATHETER: CPT | Mod: S$GLB,,, | Performed by: NURSE PRACTITIONER

## 2018-12-21 PROCEDURE — 87086 URINE CULTURE/COLONY COUNT: CPT

## 2018-12-21 PROCEDURE — 3008F BODY MASS INDEX DOCD: CPT | Mod: CPTII,S$GLB,, | Performed by: NURSE PRACTITIONER

## 2018-12-21 PROCEDURE — 81001 URINALYSIS AUTO W/SCOPE: CPT

## 2018-12-21 PROCEDURE — 99214 OFFICE O/P EST MOD 30 MIN: CPT | Mod: 25,S$GLB,, | Performed by: NURSE PRACTITIONER

## 2018-12-21 PROCEDURE — 99999 PR PBB SHADOW E&M-EST. PATIENT-LVL III: CPT | Mod: PBBFAC,,, | Performed by: NURSE PRACTITIONER

## 2018-12-21 NOTE — PATIENT INSTRUCTIONS
1. Urinary urgency vs. Frequent uti  --cath urine culture today  --cath urinalysis micro  --Empty bladder every 3 hours.  Empty well: wait a minute, lean forward on toilet.    --Avoid dietary irritants (see sheet).    --KEGELS: do 10 in AM and 10 in PM, holding each x 10 seconds.  When you feel urge to go, STOP, KEGEL, and when urge has passed, then go to bathroom.  Consider PT in future.    --URGE: consider anticholinergic vs. uribel  --STRESS:  Pessary vs. Sling.   --change pad every time you pull your pants down  --consider vaginal estrogen      2. Take 1 fiber pill/day x 3 days.  Then 2 pills/day x 3 days.  Then 3 pills/day x 3 days...increasing in this fashion to max 6 pills a day.  STOP when you find dose that makes stool easy to pass (this may be 1 pill a day or may be 4 pills/day).  Continue at this dose FOREVER.  Additionally, take 1-2 stool softener pills (EX: colace) OTC daily.  AVOID laxatives.     3. RTC pending results      _____________________________________________________    UTI PREVENTION: (from National Association for Continence)  Urinary Tract Infection (UTI)  More than 4 million doctor office visits per year in the [place] are for urinary tract infections (UTI). About 12% of men and 50% of women will have a UTI during his or her lifetime. Most UTIs arise from bacteria that normally live in the colon and rectum and are present in bowel movements. These bacteria cling to the opening of the urethra, begin to multiply, & travel up to the bladder. Urine flow from the bladder usually washes bacteria out of the body. However, because women have a shorter urethra than men, bacteria can reach the bladder more easily and settle into the bladder wall. This is why women are more likely to develop UTIs than men. This risk factor is exacerbated by the greater likelihood that women introduce bacteria from fecal matter, following a bowel movement, into the urinary tract. Less often, bacteria can spread  to the kidney from the bloodstream. A recurrent UTI is classified as three or more a year.  Risk Factors for UTI  Several factors can contribute to the risk of UTI. Sexual intercourse, use of contraceptive spermicide, low levels of estrogen, catheterization, diabetes, pregnancy, and immune suppression increase susceptibility to UTI.  Naturally occurring estrogen helps prevent recurrent UTI in women. After menopause, estrogen levels drop along with the number of vaginal lactobacilli, the good bacteria which prevent growth of intestinal bacteria in the vagina. This makes postmenopausal women especially susceptible to UTI.  Catheters also present a risk of recurring UTI. Catheters are associated with colonization of bacteria and increased risks of clinical infection. Using the techniques described previously can help keep catheters clean and prevent recurrent UTI. While single-use of sterile catheters reduces the risks, it does not prevent UTI from occurring. It is therefore important to maintain proper care and use of catheters at all times while remaining alert to symptoms of UTI.  Common Symptoms of UTI   Painful urination   Frequency   Urgency   Lower abdominal or pelvic pain or pressure   Blood in the urine   Milky, cloudy, or pink/red urine   Fever   Strong smelling urine  In the elderly populations, symptoms of a urinary tract infection, can be easily overlooked, causing a delay in diagnosis. Elderly people with a UTI are more likely than younger people not to be diagnosed until the complication of sepsis occurs. The elderly often do not experience or report obvious symptoms that younger people have, such as difficult urination, or frequent urination. Instead they may exhibit far more vague symptoms that are similiar to many disease or may be assumed to be due to the aging process. These symptoms include: confusion, feelings of general discomfort, disorientation, fatigue, weakness, behavior changes,  falling, and/or a new, acute incontinence. In addition, because incontinence is common in the elderly, they may not be aware of the symptom of frequency. If you experience any of these symptoms, see your healthcare professional.  Types of UTIs   Cystitis - an inflammation of the bladder and the most common UTI. Almost always, it occurs in women. The infection typically affects only the surface of the bladder and is brief & acute.   Pyelonephritis - more commonly known as a kidney infection and usually occurs when a lower UTI spreads to the kidneys. Occasionally, bacteria will spread to the kidney from the bloodstresam. Kidney infections are more serious and less common than bladder infections. Symptoms include a fever, pain in the back or side below the ribs, nausea, or vomiting.   Urethritis - is an inflammation of the urethra. Men commonly contract urethritis through sexual intercourse with partners infected with sexually transmitted infections. Urethritis may also result from trauma to the area or from the catheterization process.  Prevention of UTI  There are several ways to prevent bladder infections.  Bathroom Behavior:Periodically emptying the bladder by trying to urinate every two to three hours.  Diet:The use of cranberry products seems to decrease the ability of bacteria to adhere to the lining of the urethra and bladder. As cranberry juice can have a high amount of sugar, cranberry extract can be taken in capsule or pill form instead. Increasing water intake by one or two glasses per day may help limit the length of time that you have symptoms and reduce the infections.  Hygiene: Proper hygiene in caring for the urethral area is another way to limit the amount or type of bacteria that can be drawn into the urethra. This is especially true in people who have decreased sensation in the perineal region such as those with MS or who experience any amount of fecal incontinence. Using soap & water or  commercially available cleansing wipes several times per day & frequent changing of incontinence pads as they become wet can minimize the amount of bacteria in the urethral area. Women should always wipe from the front of the vagina to the back of the anus after urination or a bowel movement and wear cotton underwear. It is also reported that wearing thong undergarments may increase one's risk of developing an infection.  Sexual Activity: Can be the source of UTIs in women. Insuring proper lubrication to the vagina and voiding before and after intercourse are tactics to help prevent infection. Using diaphragms and spermicidal jelly and/or foam may increase the risk of infection, so it is important to evaluate what type of birth control you use. Some physicians encourage women who have a history of recurrent infections to take an prophylactic antibiotic after intercourse, as it reduces risk of recurrent UTI by about 85%. At a minimum, restrict your number of sexual partners and urinate immediately after sexual intimacy to lower the risk of recurrent UTIs.  Vaginal Estrogen: reduces risk of recurrent UTI by repopulating the normal vaginal lactobacilli that keep bacteria from the rectum from multiplying and causing a bladder infection. Forms of vaginal estrogen are available at very low dosages that have minimal systemic absorption.  Catheter Use: proper cleansing and storage of catheters is important in one who intermittently catheterizes, as the catheter can be a vehicle to introduce infection if the technique is incorrect or if the catheters are not properly cleaned between each use. A closed system catheter provides a reliable means of sterile IC because the introducer tip is surrounded by a urine collection bag and never exposed to bacteria typically found at the urethral opening. This greatly reduces the risk of infection.  NOTE: Vaginal estrogens and antibiotics are medications that need to be prescribed by  your healthcare provider.  Treatment of UTI  Depending on the severity of infection, UTI can be treated with oral antibiotics. A three-day course of antibiotics can treat a simple UTI. However, some infections may need to be treated for several days or weeks. Length of antibiotic treatment also depends on the type of antibiotic prescribed.  Even if a few doses of medication relieve some symptoms, you should still complete the full course of medication prescribed by your doctor. Taking aspirin, Tylenol, or non-steroidal, anti-inflammatory medications may reduce symptoms during this episode, as they may be a result of increased body temperature.  For more information regarding UTIs please visit: http://www.ncbi.nlm.nih.gov/pubmedhealth/ZKX3571083/    Bladder Irritants  Certain foods and drinks have been associated with worsening symptoms of urinary frequency, urgency, urge incontinence, or  bladder pain. If you suffer from any of these conditions, you may wish to try eliminating one or more of these foods from your  diet and see if your symptoms improve. If bladder symptoms are related to dietary factors, strict adherence to a diet that  eliminates the food should bring marked relief in 10 days. Once you are feeling better, you can begin to add foods back into  your diet, one at a time. If symptoms return, you will be able to identify the irritant. As you add foods back to your diet it is  very important that you drink significant amounts of water.  Low-acid fruit substitutions include apricots, papaya, pears and watermelon. Coffee drinkers can drink Kava or other lowacid  instant drinks. Tea drinkers can substitute non-citrus herbal and sun brewed teas. Calcium carbonate co-buffered with  calcium ascorbate can be substituted for Vitamin C. Prelief is a dietary supplement that works as an acid blocker for the  bladder.  Where to get more information:   Overcoming Bladder Disorders by Tania Sousa and Tena BRICE  Benjy Cannon, 1990   You Dont Have to Live with Cystitis! By Mary Pickering, 1988  List of Common Bladder Irritants*  Alcoholic beverages  Apples and apple juice  Cantaloupe  Carbonated beverages  Chili and spicy foods  Chocolate  Citrus fruit  Coffee (including decaffeinated)  Cranberries and cranberry juice  Grapes  Guava  Milk Products: milk, cheese, cottage cheese, yogurt, ice cream  Peaches  Pineapple  Plums  Strawberries  Sugar especially artificial sweeteners, saccharin, aspartame, corn sweeteners, honey, fructose, sucrose, lactose  Tea  Tomatoes and tomato juice  Vitamin B complex  Vinegar  *Most people are not sensitive to ALL of these products; your goal is to find the foods that make YOUR  symptoms worse

## 2018-12-21 NOTE — LETTER
December 31, 2018      Kamla Chavez MD  [address] [facility] [address]  [facility] [address]           [facility]  [address] [address]  Phone: [phone]          Patient: Lynne Velasco   MR Number: [id number]   YOB: 1975   Date of Visit: 12/21/2018       Dear Dr. Kamla Chavez:    Thank you for referring Lynne Velasco to me for evaluation. Attached you will find relevant portions of my assessment and plan of care.    If you have questions, please do not hesitate to call me. I look forward to following Lynne Velasco along with you.    Sincerely,    Es Varma NP    Enclosure  CC:  No Recipients    If you would like to receive this communication electronically, please contact [email or web address] or [phone] to request more information on [employer] Link access.    For providers and/or their staff who would like to refer a patient to [facility], please contact us through our one-stop-shop provider referral line, [facility], at [phone].    If you feel you have received this communication in error or would no longer like to receive these types of communications, please e-mail [email or web address]

## 2018-12-21 NOTE — PROGRESS NOTES
[facility] [address] [place] [address]    Lynne Velasco  [id number]  1975 December 31, 2018    Consulting Physician: Kamla Chavez MD   GYN: Dr. Chavez  Primary M.D.: Jf Kuo MD    Chief Complaint   Patient presents with    Urinary Tract Infection       HPI:     1)  UI:  (--) SUZI (+) UUI  (mostly post void dribbling) X 1 1/2 years.  (+)  Liner pads:2/day, usually moderate wetness.  Daytime frequency: Q 2 hours.  Nocturia: Yes: 1/night.   (--) dysuria,  (--) hematuria,  (+) frequent UTIs.  (--) complete bladder emptying. Does not feel like she empties all the way. Has urinary urgency with a full bladder.    2)  POP:  Absent. Symptoms:(+) lower abdominal pressure  .  (+) vaginal bleeding--post coital spotting. (--) vaginal discharge. (+) sexually active.  (+) dyspareunia.   With deep penetrateion (--)  Vaginal dryness.  (--) vaginal estrogen use.     3)  BM:  (+) constipation/straining.  (--) chronic diarrhea. (--) hematochezia.  (--) fecal incontinence.  (--) fecal smearing/urgency.  (+) complete evacuation.  *    4)recurrent uti  Had another one at urgent care outside of [facility]  11/26/2018 e. coli  11/20/2017 e. coli    Past Medical History  Past Medical History:   Diagnosis Date    Abnormal Pap smear 1993    cryo for dysplasia    Anxiety     Depression     History of psychiatric care     prozac, lexapro, ativan    Mental disorder     Depression    Psychiatric problem     Therapy         Past Surgical History  Past Surgical History:   Procedure Laterality Date    AUGMENTATION OF BREAST      saline, 2010    Breast implants 1998 1998    Cryo 1993      DILATION AND CURETTAGE OF UTERUS      DILATION AND CURETTAGE OF UTERUS      ENDOMETRIAL ABLATION  July 11,2014    Hysteroscopy with Novasure endometrial ablation    Novasure ablation with D&C Hysteroscopy N/A 7/11/2014    Performed by Kamla Chavez MD at [facility] OR         Hysterectomy: Yes -    Past Ob History     x 3.      Largest infant weight: 7# 8 oz  no FAVD. yes episiotomy.      Gynecologic History  LMP: Patient's last menstrual period was 2018 (exact date).  Age of menarche: 16  Age of menopause: n/a  Menstrual history: metrorrhagia-- has had and ablation x 2  Pap test: 2018 normal neg hpv.  History of abnormal paps: Yes - cryo in the past.  History of STIs:  No  Mammogram: Date of last: 2018.  Result: Normal       Family History  Family History   Problem Relation Age of Onset    Diabetes Father     Stroke Father     Diabetes Mother     Hypertension Mother     Coronary artery disease Mother     Depression Mother     Seizures Mother     Ovarian cancer Maternal Aunt     Breast cancer Maternal Aunt     Breast cancer Paternal Aunt     Colon cancer Neg Hx     Suicide Neg Hx       Colon CA: No  Breast CA: Yes - maternal aunt  GYN CA: Yes - maternal grandmother ovarian cancer   CA: No    Social History  Social History     Tobacco Use   Smoking Status Former Smoker    Packs/day: 0.50    Years: 5.00    Pack years: 2.50    Types: Cigarettes    Last attempt to quit: 10/15/1999    Years since quittin.2   Smokeless Tobacco Never Used   .    Social History     Substance and Sexual Activity   Alcohol Use Yes    Alcohol/week: 1.2 oz    Types: 2 Glasses of wine per week    Comment: social   .    Social History     Substance and Sexual Activity   Drug Use No   .  The patient is .  Resides in [place] [address].  Employment status: currently employed.     Allergies  Review of patient's allergies indicates:   Allergen Reactions    Effexor [venlafaxine] Palpitations       Medications  Current Outpatient Medications on File Prior to Visit   Medication Sig Dispense Refill    melatonin 10 mg Cap Take by mouth.       No current facility-administered medications on file prior to visit.        Review of Systems A 14 point ROS  "was reviewed with pertinent positives as noted above in the history of present illness.      Constitutional: negative  Eyes: negative  Endocrine: negative  Gastrointestinal: negative  Cardiovascular: negative  Respiratory: negative  Allergic/Immunologic: negative  Integumentary: negative  Psychiatric: negative  Musculoskeletal: negative   Ear/Nose/Throat: negative  Neurologic: negative  Genitourinary: SEE HPI  Hematologic/Lymphatic: negative   Breast: negative    Urogynecologic Exam  /70 (BP Location: Left arm, Patient Position: Sitting, BP Method: Medium (Manual))   Ht 5' 7" (1.702 m)   Wt 62.5 kg (137 lb 12.6 oz)   LMP 11/22/2018 (Exact Date)   BMI 21.58 kg/m²     GENERAL APPEARANCE:  The patient is well-developed, well-nourished.   Neck:  Supple with no thyromegaly, no carotid bruits.  Heart:  Regular rate and rhythm, no murmurs, rubs or gallops.  Lungs:  Clear.  No CVA tenderness.  Abdomen:  Soft, nontender, nondistended, no hepatosplenomegaly.    PELVIC:    External genitalia:  Normal Bartholins, Skenes and labia bilaterally.    Urethra:  No caruncle, diverticulum or masses.  (--) hypermobility.    Vagina:  Atrophy (+) mild , no bladder masses or tender, no discharge.  + TTP in R levator ani-- mimicks back pain  Cervix:  normal appearance  Uterus: normal size, contour, position, consistency, mobility, non-tender  Adnexa: Not palpable.    POP-Q:   No obvious POP present with valsalva.     NEUROLOGIC:  Cranial nerves 2 through 12 intact.  Strength 5/5.  DTRs 2+ lower extremities.  S2 through 4 normal.  Sacral reflexes intact.    EXT: ALVAREZ, 2+ pulses bilaterally, no C/C/E    COUGH STRESS TEST:  negative  KEGEL: 3 /5    RECTAL:    External:  Normal, (--) hemorrhoids, (--) dovetailing.   Internal:  Deferred    PVR: 20 mL    Impression    1. Urinary urgency    2. Frequent UTI        Initial Plan  The patient was counseled regarding these issues. The patient was given a summary sheet containing each of these " issues with possible options for evaluation and management. When appropriate, we also reviewed computer-generated diagrams specific to their diagnoses..  All questions were addressed to the patient's satisfaction.     1. Urinary urgency vs. Frequent uti  --cath urine culture today  --cath urinalysis micro  --Empty bladder every 3 hours.  Empty well: wait a minute, lean forward on toilet.    --Avoid dietary irritants (see sheet).    --KEGELS: do 10 in AM and 10 in PM, holding each x 10 seconds.  When you feel urge to go, STOP, KEGEL, and when urge has passed, then go to bathroom.  Consider PT in future.    --URGE: consider anticholinergic vs. uribel  --STRESS:  Pessary vs. Sling.   --change pad every time you pull your pants down  --consider vaginal estrogen      2. Take 1 fiber pill/day x 3 days.  Then 2 pills/day x 3 days.  Then 3 pills/day x 3 days...increasing in this fashion to max 6 pills a day.  STOP when you find dose that makes stool easy to pass (this may be 1 pill a day or may be 4 pills/day).  Continue at this dose FOREVER.  Additionally, take 1-2 stool softener pills (EX: colace) OTC daily.  AVOID laxatives.     3. RTC pending results        Approximately 30 min were spent in consult, 90 % in discussion.     Thank you for requesting consultation of your patient.  I look forward to participating in their care.    Es Varma  Female Pelvic Medicine and Reconstructive Surgery  [facility] [place], [place]

## 2018-12-22 LAB — BACTERIA UR CULT: NO GROWTH

## 2018-12-24 ENCOUNTER — PATIENT MESSAGE (OUTPATIENT)
Dept: UROGYNECOLOGY | Facility: CLINIC | Age: 43
End: 2018-12-24

## 2019-02-11 ENCOUNTER — PATIENT MESSAGE (OUTPATIENT)
Dept: UROGYNECOLOGY | Facility: CLINIC | Age: 44
End: 2019-02-11

## 2019-02-11 DIAGNOSIS — R35.0 URINARY FREQUENCY: Primary | ICD-10-CM

## 2019-02-11 NOTE — TELEPHONE ENCOUNTER
ZULEIMAI,  UA/UC orders placed.    I think I have another bladder infection. Know you mentioned to let you know for me to come in a test to determine if accurate or not.

## 2019-02-22 RX ORDER — ONDANSETRON 4 MG/1
4 TABLET, FILM COATED ORAL EVERY 6 HOURS PRN
Qty: 20 TABLET | Refills: 0 | Status: SHIPPED | OUTPATIENT
Start: 2019-02-22 | End: 2019-04-04

## 2019-02-22 NOTE — TELEPHONE ENCOUNTER
Spoke with patient and let her know we sent in Zofran.    ----- Message from Loida Irving sent at 2/22/2019 11:31 AM CST -----  Contact: evangelista  Name of Who is Calling: evangelista      What is the request in detail: Patient is requesting a calk back she states she had food poisoning  a few days ago and wanted to see if there is something she can take       Can the clinic reply by [facility]: no      What Number to Call Back if not in [facility]: [phone]

## 2019-02-28 ENCOUNTER — LAB VISIT (OUTPATIENT)
Dept: LAB | Facility: HOSPITAL | Age: 44
End: 2019-02-28
Payer: COMMERCIAL

## 2019-02-28 DIAGNOSIS — R35.0 URINARY FREQUENCY: ICD-10-CM

## 2019-02-28 LAB
AMORPH CRY UR QL COMP ASSIST: ABNORMAL
BACTERIA #/AREA URNS AUTO: ABNORMAL /HPF
BILIRUB UR QL STRIP: NEGATIVE
CLARITY UR REFRACT.AUTO: ABNORMAL
COLOR UR AUTO: YELLOW
GLUCOSE UR QL STRIP: NEGATIVE
HGB UR QL STRIP: NEGATIVE
KETONES UR QL STRIP: NEGATIVE
LEUKOCYTE ESTERASE UR QL STRIP: ABNORMAL
MICROSCOPIC COMMENT: ABNORMAL
NITRITE UR QL STRIP: POSITIVE
PH UR STRIP: 8 [PH] (ref 5–8)
PROT UR QL STRIP: NEGATIVE
SP GR UR STRIP: 1.01 (ref 1–1.03)
SQUAMOUS #/AREA URNS AUTO: 16 /HPF
URN SPEC COLLECT METH UR: ABNORMAL
WBC #/AREA URNS AUTO: 43 /HPF (ref 0–5)

## 2019-02-28 PROCEDURE — 87086 URINE CULTURE/COLONY COUNT: CPT

## 2019-02-28 PROCEDURE — 87077 CULTURE AEROBIC IDENTIFY: CPT

## 2019-02-28 PROCEDURE — 87186 SC STD MICRODIL/AGAR DIL: CPT

## 2019-02-28 PROCEDURE — 87088 URINE BACTERIA CULTURE: CPT

## 2019-02-28 PROCEDURE — 81001 URINALYSIS AUTO W/SCOPE: CPT

## 2019-03-01 ENCOUNTER — PATIENT MESSAGE (OUTPATIENT)
Dept: UROGYNECOLOGY | Facility: CLINIC | Age: 44
End: 2019-03-01

## 2019-03-01 DIAGNOSIS — N30.00 ACUTE CYSTITIS WITHOUT HEMATURIA: Primary | ICD-10-CM

## 2019-03-01 RX ORDER — NITROFURANTOIN 25; 75 MG/1; MG/1
100 CAPSULE ORAL 2 TIMES DAILY
Qty: 14 CAPSULE | Refills: 0 | Status: SHIPPED | OUTPATIENT
Start: 2019-03-01 | End: 2019-04-04

## 2019-03-01 NOTE — TELEPHONE ENCOUNTER
Called pt regarding test results, no answer, Left voice message for pt to give the office a call back at [phone].

## 2019-03-03 LAB — BACTERIA UR CULT: NORMAL

## 2019-03-13 ENCOUNTER — PATIENT MESSAGE (OUTPATIENT)
Dept: UROGYNECOLOGY | Facility: CLINIC | Age: 44
End: 2019-03-13

## 2019-03-13 NOTE — TELEPHONE ENCOUNTER
Patient recently started macrobid.  She will call if symptoms do not improve.  Es Varma, MAHESHP-BC

## 2019-04-03 ENCOUNTER — PATIENT MESSAGE (OUTPATIENT)
Dept: UROGYNECOLOGY | Facility: CLINIC | Age: 44
End: 2019-04-03

## 2019-04-04 ENCOUNTER — OFFICE VISIT (OUTPATIENT)
Dept: UROGYNECOLOGY | Facility: CLINIC | Age: 44
End: 2019-04-04
Payer: COMMERCIAL

## 2019-04-04 ENCOUNTER — HOSPITAL ENCOUNTER (OUTPATIENT)
Dept: RADIOLOGY | Facility: OTHER | Age: 44
Discharge: HOME | End: 2019-04-04
Attending: NURSE PRACTITIONER
Payer: COMMERCIAL

## 2019-04-04 VITALS
SYSTOLIC BLOOD PRESSURE: 100 MMHG | HEIGHT: 67 IN | DIASTOLIC BLOOD PRESSURE: 70 MMHG | BODY MASS INDEX: 21.97 KG/M2 | WEIGHT: 140 LBS

## 2019-04-04 DIAGNOSIS — R10.2 PELVIC PAIN: Primary | ICD-10-CM

## 2019-04-04 DIAGNOSIS — R10.2 ADNEXAL TENDERNESS: ICD-10-CM

## 2019-04-04 DIAGNOSIS — R10.2 PELVIC PAIN: ICD-10-CM

## 2019-04-04 DIAGNOSIS — R39.15 URINARY URGENCY: ICD-10-CM

## 2019-04-04 PROCEDURE — 76830 TRANSVAGINAL US NON-OB: CPT | Mod: 26,,, | Performed by: RADIOLOGY

## 2019-04-04 PROCEDURE — 99999 PR PBB SHADOW E&M-EST. PATIENT-LVL III: CPT | Mod: PBBFAC,,, | Performed by: NURSE PRACTITIONER

## 2019-04-04 PROCEDURE — 76830 US PELVIS COMP WITH TRANSVAG NON-OB (XPD): ICD-10-PCS | Mod: 26,,, | Performed by: RADIOLOGY

## 2019-04-04 PROCEDURE — 51701 PR INSERTION OF NON-INDWELLING BLADDER CATHETERIZATION FOR RESIDUAL UR: ICD-10-PCS | Mod: S$GLB,,, | Performed by: NURSE PRACTITIONER

## 2019-04-04 PROCEDURE — 76856 US EXAM PELVIC COMPLETE: CPT | Mod: 26,,, | Performed by: RADIOLOGY

## 2019-04-04 PROCEDURE — 76856 US PELVIS COMP WITH TRANSVAG NON-OB (XPD): ICD-10-PCS | Mod: 26,,, | Performed by: RADIOLOGY

## 2019-04-04 PROCEDURE — 99999 PR PBB SHADOW E&M-EST. PATIENT-LVL III: ICD-10-PCS | Mod: PBBFAC,,, | Performed by: NURSE PRACTITIONER

## 2019-04-04 PROCEDURE — 3008F BODY MASS INDEX DOCD: CPT | Mod: CPTII,S$GLB,, | Performed by: NURSE PRACTITIONER

## 2019-04-04 PROCEDURE — 51701 INSERT BLADDER CATHETER: CPT | Mod: S$GLB,,, | Performed by: NURSE PRACTITIONER

## 2019-04-04 PROCEDURE — 76830 TRANSVAGINAL US NON-OB: CPT | Mod: TC

## 2019-04-04 PROCEDURE — 99214 PR OFFICE/OUTPT VISIT, EST, LEVL IV, 30-39 MIN: ICD-10-PCS | Mod: 25,S$GLB,, | Performed by: NURSE PRACTITIONER

## 2019-04-04 PROCEDURE — 3008F PR BODY MASS INDEX (BMI) DOCUMENTED: ICD-10-PCS | Mod: CPTII,S$GLB,, | Performed by: NURSE PRACTITIONER

## 2019-04-04 PROCEDURE — 87086 URINE CULTURE/COLONY COUNT: CPT

## 2019-04-04 PROCEDURE — 99214 OFFICE O/P EST MOD 30 MIN: CPT | Mod: 25,S$GLB,, | Performed by: NURSE PRACTITIONER

## 2019-04-04 RX ORDER — BROMPHENIRAMINE MALEATE, PSEUDOEPHEDRINE HYDROCHLORIDE, AND DEXTROMETHORPHAN HYDROBROMIDE 2; 30; 10 MG/5ML; MG/5ML; MG/5ML
SYRUP ORAL
COMMUNITY
End: 2019-04-04

## 2019-04-04 RX ORDER — PROMETHAZINE HYDROCHLORIDE AND DEXTROMETHORPHAN HYDROBROMIDE 6.25; 15 MG/5ML; MG/5ML
SYRUP ORAL
COMMUNITY
End: 2019-04-04

## 2019-04-04 NOTE — PATIENT INSTRUCTIONS
1. Urinary urgency vs. Frequent uti  --cath urine culture today  --Empty bladder every 3 hours.  Empty well: wait a minute, lean forward on toilet.    --Avoid dietary irritants (see sheet).    --KEGELS: do 10 in AM and 10 in PM, holding each x 10 seconds.  When you feel urge to go, STOP, KEGEL, and when urge has passed, then go to bathroom.  Consider PT in future.    --URGE: consider anticholinergic vs. uribel  --STRESS:  Pessary vs. Sling.   --change pad every time you pull your pants down  --consider vaginal estrogen        2.uterine enlargement/ adnexal fullness  --pelvic ultrasound today     3. RTC pending results    --------------------------------------------------------------------------------------------------------------------------    Bladder Irritants  Certain foods and drinks have been associated with worsening symptoms of urinary frequency, urgency, urge incontinence, or  bladder pain. If you suffer from any of these conditions, you may wish to try eliminating one or more of these foods from your  diet and see if your symptoms improve. If bladder symptoms are related to dietary factors, strict adherence to a diet that  eliminates the food should bring marked relief in 10 days. Once you are feeling better, you can begin to add foods back into  your diet, one at a time. If symptoms return, you will be able to identify the irritant. As you add foods back to your diet it is  very important that you drink significant amounts of water.  Low-acid fruit substitutions include apricots, papaya, pears and watermelon. Coffee drinkers can drink Kava or other lowacid  instant drinks. Tea drinkers can substitute non-citrus herbal and sun brewed teas. Calcium carbonate co-buffered with  calcium ascorbate can be substituted for Vitamin C. Prelief is a dietary supplement that works as an acid blocker for the  bladder.  Where to get more information:   Overcoming Bladder Disorders by Tania Sousa and Tena BRICE  Benjy Cannon, 1990   You Dont Have to Live with Cystitis! By Mary Pickering, 1988  List of Common Bladder Irritants*  Alcoholic beverages  Apples and apple juice  Cantaloupe  Carbonated beverages  Chili and spicy foods  Chocolate  Citrus fruit  Coffee (including decaffeinated)  Cranberries and cranberry juice  Grapes  Guava  Milk Products: milk, cheese, cottage cheese, yogurt, ice cream  Peaches  Pineapple  Plums  Strawberries  Sugar especially artificial sweeteners, saccharin, aspartame, corn sweeteners, honey, fructose, sucrose, lactose  Tea  Tomatoes and tomato juice  Vitamin B complex  Vinegar  *Most people are not sensitive to ALL of these products; your goal is to find the foods that make YOUR  symptoms worse

## 2019-04-04 NOTE — PROGRESS NOTES
Urogyn follow up  [unfilled]  .  [facility] UROGYN [facility] 4   [address] [address]    Lynne Velasco  [id number]  1975      Lynne Velasco is a 43 y.o.  here for a urogyn follow up for urinary frequency.    Last HPI from 12/21/2018     1)  UI:  (--) SUZI (+) UUI  (mostly post void dribbling) X 1 1/2 years.  (+)  Liner pads:2/day, usually moderate wetness.  Daytime frequency: Q 2 hours.  Nocturia: Yes: 1/night.   (--) dysuria,  (--) hematuria,  (+) frequent UTIs.  (--) complete bladder emptying. Does not feel like she empties all the way. Has urinary urgency with a full bladder.     2)  POP:  Absent. Symptoms:(+) lower abdominal pressure  .  (+) vaginal bleeding--post coital spotting. (--) vaginal discharge. (+) sexually active.  (+) dyspareunia.   With deep penetrateion (--)  Vaginal dryness.  (--) vaginal estrogen use.      3)  BM:  (+) constipation/straining.  (--) chronic diarrhea. (--) hematochezia.  (--) fecal incontinence.  (--) fecal smearing/urgency.  (+) complete evacuation.  *     4)recurrent uti  Had another one at urgent care outside of [facility]  11/26/2018 e. coli  11/20/2017 e. coli      Changes from last visit:    1. Urinary urgency vs. Frequent uti  --cath urinalysis micro was normal  --denies SUZI  --rare UUI --wearing liner pad occasionally  --urinary frequency started 2 days ago.        2. Constipation  --very intermittent  --eats high fiber diet    3)recurrent uti  --started with symptoms 2 days ago--R lower abdoman/ and bilateral back aching/pressure intermittent pain--felt like her abdomen was bloated. Felt occasional sharp pain in LLQ radiating down her leg.    Had another one at urgent care outside of [facility]  2/28/2019 e. coli  11/26/2018 e. coli  11/20/2017 e. coli        Past Medical History:   Diagnosis Date    Abnormal Pap smear 1993    cryo for dysplasia    Anxiety     Depression     History of psychiatric care     prozac,  "lexapro, ativan    Mental disorder     Depression    Psychiatric problem     Therapy        Past Surgical History:   Procedure Laterality Date    AUGMENTATION OF BREAST      saline, 2010    Breast implants 1998 1998    Cryo 1993      DILATION AND CURETTAGE OF UTERUS      DILATION AND CURETTAGE OF UTERUS      ENDOMETRIAL ABLATION  July 11,2014    Hysteroscopy with Novasure endometrial ablation    Novasure ablation with D&C Hysteroscopy N/A 7/11/2014    Performed by Kamla Chavez MD at [facility], operated by [facility] OR       Current Outpatient Medications   Medication Sig    melatonin 10 mg Cap Take by mouth.     No current facility-administered medications for this visit.        Well woman:  Pap test: 11/26/2018 normal neg hpv.  History of abnormal paps: Yes - cryo in the past.  History of STIs:  No  Mammogram: Date of last: 11/2018.  Result: Normal      ROS:  As per HPI.      Exam  /70 (BP Location: Left arm, Patient Position: Sitting, BP Method: Medium (Manual))   Ht 5' 7" (1.702 m)   Wt 63.5 kg (139 lb 15.9 oz)   BMI 21.93 kg/m²   General: alert and oriented, no acute distress  Respiratory: normal respiratory effort  Abd: soft, non-tender, non-distended    Pelvic  Ext. Genitalia: normal external genitalia. Normal bartholin's and skeens glands  Vagina: -- atrophy. Normal vaginal mucosa without lesions. No discharge noted.   Non-tender bladder base without palpable mass. Mild +TTP in bilateral levator ani and OI-- does not reproduce abdominal pain.  Cervix: no lesions  Uterus:  enlarged to 10  week's size   Adnexa:  Fullness noted in R adnexa abutting uterus ? Fibroid vs ovarian cyst-- tender to palpation  Urethra: no masses or tenderness  Urethral meatus: no lesions, caruncle or prolapse.    Cath specimen today-- PVR 50 mL      Impression  1. Pelvic pain  US Pelvis Comp with Transvag NON-OB (xpd   2. Adnexal tenderness  US Pelvis Comp with Transvag NON-OB (xpd   3. Urinary urgency       We reviewed the above " issues and discussed options for short-term versus long-term management of her problems.   Plan:     1. Urinary urgency vs. Frequent uti  --cath urine culture today  --Empty bladder every 3 hours.  Empty well: wait a minute, lean forward on toilet.    --Avoid dietary irritants (see sheet).    --KEGELS: do 10 in AM and 10 in PM, holding each x 10 seconds.  When you feel urge to go, STOP, KEGEL, and when urge has passed, then go to bathroom.  Consider PT in future.    --URGE: consider anticholinergic vs. uribel  --STRESS:  Pessary vs. Sling.   --change pad every time you pull your pants down  --consider vaginal estrogen        2.uterine enlargement/ adnexal fullness  --pelvic ultrasound today     3. RTC pending results    30 minutes were spent in face to face time with this patient  90 % of this time was spent in counseling and/or coordination of care      NADER Figueroa-BC  [facility]  Division of Female Pelvic Medicine and Reconstructive Surgery  Department of Obstetrics & Gynecology

## 2019-04-05 ENCOUNTER — PATIENT MESSAGE (OUTPATIENT)
Dept: UROGYNECOLOGY | Facility: CLINIC | Age: 44
End: 2019-04-05

## 2019-04-05 NOTE — TELEPHONE ENCOUNTER
Attempted to reach patient to discuss pelvic ultrasound results.  MO message sent.  Message left on vm to call office on Monday.  NADER Figueroa-BC

## 2019-04-06 LAB — BACTERIA UR CULT: NO GROWTH

## 2019-04-08 ENCOUNTER — TELEPHONE (OUTPATIENT)
Dept: UROGYNECOLOGY | Facility: CLINIC | Age: 44
End: 2019-04-08

## 2019-04-08 DIAGNOSIS — R39.15 URINARY URGENCY: Primary | ICD-10-CM

## 2019-04-08 RX ORDER — OXYBUTYNIN CHLORIDE 5 MG/1
5 TABLET, EXTENDED RELEASE ORAL DAILY
Qty: 30 TABLET | Refills: 12 | Status: SHIPPED | OUTPATIENT
Start: 2019-04-08 | End: 2019-06-10 | Stop reason: DRUGHIGH

## 2019-04-08 NOTE — TELEPHONE ENCOUNTER
----- Message from Bhaskar Echevarria sent at 4/8/2019 11:37 AM CDT -----  PLEASE CALL PT SHE IS RETURNING YOUR CALL [phone]

## 2019-04-08 NOTE — TELEPHONE ENCOUNTER
Reviewed results with patient. Will start oxybutynin xl 5 mg.  Scheduled for follow up visit. NADER Figueroa-BC

## 2019-04-08 NOTE — TELEPHONE ENCOUNTER
Pt stated she was returning MIRZA Varma call to dicuss test results. Informed pt MIRZA Varma is unavailable at the moment and will relay this message to her. Pt voiced understanding and call ended.

## 2019-04-10 ENCOUNTER — PATIENT MESSAGE (OUTPATIENT)
Dept: UROGYNECOLOGY | Facility: CLINIC | Age: 44
End: 2019-04-10

## 2019-06-10 ENCOUNTER — OFFICE VISIT (OUTPATIENT)
Dept: UROGYNECOLOGY | Facility: CLINIC | Age: 44
End: 2019-06-10
Payer: COMMERCIAL

## 2019-06-10 VITALS
HEIGHT: 67 IN | SYSTOLIC BLOOD PRESSURE: 100 MMHG | WEIGHT: 140 LBS | DIASTOLIC BLOOD PRESSURE: 60 MMHG | BODY MASS INDEX: 21.97 KG/M2

## 2019-06-10 DIAGNOSIS — R35.0 URINARY FREQUENCY: ICD-10-CM

## 2019-06-10 DIAGNOSIS — R39.15 URINARY URGENCY: Primary | ICD-10-CM

## 2019-06-10 PROCEDURE — 99999 PR PBB SHADOW E&M-EST. PATIENT-LVL III: ICD-10-PCS | Mod: PBBFAC,,, | Performed by: NURSE PRACTITIONER

## 2019-06-10 PROCEDURE — 99214 OFFICE O/P EST MOD 30 MIN: CPT | Mod: S$GLB,,, | Performed by: NURSE PRACTITIONER

## 2019-06-10 PROCEDURE — 99999 PR PBB SHADOW E&M-EST. PATIENT-LVL III: CPT | Mod: PBBFAC,,, | Performed by: NURSE PRACTITIONER

## 2019-06-10 PROCEDURE — 99214 PR OFFICE/OUTPT VISIT, EST, LEVL IV, 30-39 MIN: ICD-10-PCS | Mod: S$GLB,,, | Performed by: NURSE PRACTITIONER

## 2019-06-10 PROCEDURE — 3008F BODY MASS INDEX DOCD: CPT | Mod: CPTII,S$GLB,, | Performed by: NURSE PRACTITIONER

## 2019-06-10 PROCEDURE — 3008F PR BODY MASS INDEX (BMI) DOCUMENTED: ICD-10-PCS | Mod: CPTII,S$GLB,, | Performed by: NURSE PRACTITIONER

## 2019-06-10 RX ORDER — OXYBUTYNIN CHLORIDE 10 MG/1
10 TABLET, EXTENDED RELEASE ORAL DAILY
Qty: 30 TABLET | Refills: 11 | Status: SHIPPED | OUTPATIENT
Start: 2019-06-10 | End: 2020-01-20

## 2019-06-10 RX ORDER — SPIRONOLACTONE 50 MG/1
50 TABLET, FILM COATED ORAL DAILY
Refills: 5 | COMMUNITY
Start: 2019-04-22

## 2019-06-10 NOTE — PROGRESS NOTES
Urogyn follow up  06/10/2019  .  [facility] UROGYN [facility] 4   [address] [address]    Lynne Velasco  [id number]  1975      Lynne Velasco is a 43 y.o.  here for a urogyn follow up for urinary frequency.    Last HPI from 04/04/2019   1. Urinary urgency vs. Frequent uti  --cath urinalysis micro was normal  --denies SUZI  --rare UUI --wearing liner pad occasionally  --urinary frequency started 2 days ago.        2. Constipation  --very intermittent  --eats high fiber diet    3)recurrent uti  --started with symptoms 2 days ago--R lower abdoman/ and bilateral back aching/pressure intermittent pain--felt like her abdomen was bloated. Felt occasional sharp pain in LLQ radiating down her leg.    Had another one at urgent care outside of [facility]  2/28/2019 e. coli  11/26/2018 e. coli  11/20/2017 e. coli        Changes from last visit:    1. Urinary urgency vs. Frequent uti  --denies SUZI  --rare UUI --wearing liner pad occasionally  --voiding every hour  --nocturia 1-2 times/ night-- limits fluids at least 2 hours prior to bedtime        2. Constipation  --resolved  --eats high fiber diet    3)recurrent uti  --asymptomatic at this  2/28/2019 e. coli  11/26/2018 e. coli  11/20/2017 e. coli        Past Medical History:   Diagnosis Date    Abnormal Pap smear 1993    cryo for dysplasia    Anxiety     Depression     History of psychiatric care     prozac, lexapro, ativan    Mental disorder     Depression    Psychiatric problem     Therapy        Past Surgical History:   Procedure Laterality Date    AUGMENTATION OF BREAST      saline, 2010    Breast implants 1998 1998    Cryo 1993      DILATION AND CURETTAGE OF UTERUS      DILATION AND CURETTAGE OF UTERUS      ENDOMETRIAL ABLATION  July 11,2014    Hysteroscopy with Novasure endometrial ablation    Novasure ablation with D&C Hysteroscopy N/A 7/11/2014    Performed by Kamla Chavez MD at [facility] OR  "      Current Outpatient Medications   Medication Sig    melatonin 10 mg Cap Take by mouth.    spironolactone (ALDACTONE) 50 MG tablet TK 1 T PO QD FOR 2 WEEKS THEN 2 TS QD FOR 2 WEEKS THEN 3 TS QD THEREAFTER AS TOLERATED    oxybutynin (DITROPAN-XL) 10 MG 24 hr tablet Take 1 tablet (10 mg total) by mouth once daily.     No current facility-administered medications for this visit.        Well woman:  Pap test: 11/26/2018 normal neg hpv.  History of abnormal paps: Yes - cryo in the past.  History of STIs:  No  Mammogram: Date of last: 11/2018.  Result: Normal      ROS:  As per HPI.      Exam  /60 (BP Location: Right arm, Patient Position: Sitting, BP Method: Medium (Manual))   Ht 5' 7" (1.702 m)   Wt 63.5 kg (139 lb 15.9 oz)   BMI 21.93 kg/m²   General: alert and oriented, no acute distress  Respiratory: normal respiratory effort  Abd: soft, non-tender, non-distended    Pelvic--deferred      Impression  1. Urinary urgency  oxybutynin (DITROPAN-XL) 10 MG 24 hr tablet   2. Urinary frequency       We reviewed the above issues and discussed options for short-term versus long-term management of her problems.   Plan:     1. Urinary urgency   --Empty bladder every 3 hours.  Empty well: wait a minute, lean forward on toilet.    --Avoid dietary irritants (see sheet).    --KEGELS: do 10 in AM and 10 in PM, holding each x 10 seconds.  When you feel urge to go, STOP, KEGEL, and when urge has passed, then go to bathroom.  Consider PT in future.    --URGE:increase oxybutynin xl 10 mg daily (if no improvement, will consider pelvic floor PT)--would send [facility]  --STRESS:  Pessary vs. Sling.   --change pad every time you pull your pants down  --consider vaginal estrogen        2.uterine enlargement/ adnexal fullness  --pelvic ultrasound showed fibroids     3. RTC 6 months    30 minutes were spent in face to face time with this patient  90 % of this time was spent in counseling and/or coordination of care      Es" NADER Varma-BC  [facility]  Division of Female Pelvic Medicine and Reconstructive Surgery  Department of Obstetrics & Gynecology

## 2019-06-10 NOTE — PATIENT INSTRUCTIONS
1. Urinary urgency   --Empty bladder every 3 hours.  Empty well: wait a minute, lean forward on toilet.    --Avoid dietary irritants (see sheet).    --KEGELS: do 10 in AM and 10 in PM, holding each x 10 seconds.  When you feel urge to go, STOP, KEGEL, and when urge has passed, then go to bathroom.  Consider PT in future.    --URGE:increase oxybutynin xl 10 mg daily (if no improvement, will consider pelvic floor PT)--would send Therapy[facility]  --STRESS:  Pessary vs. Sling.   --change pad every time you pull your pants down  --consider vaginal estrogen        2.uterine enlargement/ adnexal fullness  --pelvic ultrasound showed fibroids     3. RTC 6 months

## 2020-01-07 ENCOUNTER — HOSPITAL ENCOUNTER (OUTPATIENT)
Dept: RADIOLOGY | Facility: HOSPITAL | Age: 45
Discharge: HOME | End: 2020-01-07
Attending: OBSTETRICS & GYNECOLOGY
Payer: COMMERCIAL

## 2020-01-07 VITALS — WEIGHT: 140 LBS | BODY MASS INDEX: 21.93 KG/M2

## 2020-01-07 DIAGNOSIS — Z12.31 SCREENING MAMMOGRAM, ENCOUNTER FOR: ICD-10-CM

## 2020-01-07 PROCEDURE — 77067 SCR MAMMO BI INCL CAD: CPT | Mod: 26,,, | Performed by: RADIOLOGY

## 2020-01-07 PROCEDURE — 77063 BREAST TOMOSYNTHESIS BI: CPT | Mod: 26,,, | Performed by: RADIOLOGY

## 2020-01-07 PROCEDURE — 77067 MAMMO DIGITAL SCREENING BILAT WITH TOMOSYNTHESIS_CAD: ICD-10-PCS | Mod: 26,,, | Performed by: RADIOLOGY

## 2020-01-07 PROCEDURE — 77067 SCR MAMMO BI INCL CAD: CPT | Mod: TC

## 2020-01-07 PROCEDURE — 77063 MAMMO DIGITAL SCREENING BILAT WITH TOMOSYNTHESIS_CAD: ICD-10-PCS | Mod: 26,,, | Performed by: RADIOLOGY

## 2020-01-20 ENCOUNTER — OFFICE VISIT (OUTPATIENT)
Dept: OBSTETRICS AND GYNECOLOGY | Facility: CLINIC | Age: 45
End: 2020-01-20
Attending: OBSTETRICS & GYNECOLOGY
Payer: COMMERCIAL

## 2020-01-20 VITALS
BODY MASS INDEX: 22.49 KG/M2 | DIASTOLIC BLOOD PRESSURE: 62 MMHG | HEIGHT: 67 IN | SYSTOLIC BLOOD PRESSURE: 116 MMHG | WEIGHT: 143.31 LBS

## 2020-01-20 DIAGNOSIS — Z01.419 ENCOUNTER FOR GYNECOLOGICAL EXAMINATION WITHOUT ABNORMAL FINDING: Primary | ICD-10-CM

## 2020-01-20 DIAGNOSIS — Z12.31 SCREENING MAMMOGRAM, ENCOUNTER FOR: ICD-10-CM

## 2020-01-20 DIAGNOSIS — N94.6 DYSMENORRHEA: ICD-10-CM

## 2020-01-20 PROCEDURE — 99396 PREV VISIT EST AGE 40-64: CPT | Mod: S$GLB,,, | Performed by: OBSTETRICS & GYNECOLOGY

## 2020-01-20 PROCEDURE — 99396 PR PREVENTIVE VISIT,EST,40-64: ICD-10-PCS | Mod: S$GLB,,, | Performed by: OBSTETRICS & GYNECOLOGY

## 2020-01-20 RX ORDER — ESCITALOPRAM OXALATE 10 MG/1
TABLET ORAL
COMMUNITY
End: 2021-05-14 | Stop reason: SDUPTHER

## 2020-01-20 RX ORDER — ZOLPIDEM TARTRATE 5 MG/1
TABLET ORAL
Status: ON HOLD | COMMUNITY
End: 2020-12-07

## 2020-01-20 RX ORDER — NAPROXEN SODIUM 550 MG/1
550 TABLET ORAL 2 TIMES DAILY WITH MEALS
Qty: 60 TABLET | Refills: 4 | Status: SHIPPED | OUTPATIENT
Start: 2020-01-20 | End: 2020-02-19

## 2020-01-20 RX ORDER — LORAZEPAM 0.5 MG/1
TABLET ORAL
COMMUNITY

## 2020-01-20 NOTE — PROGRESS NOTES
Subjective:       Patient ID: Lynne Velasco is a 44 y.o. female.    Chief Complaint:  Annual Exam      History of Present Illness  HPI    Lynne Velasco is a 44 y.o. female  here for her annual GYN exam.  (Seeing Urogyn for bladder leakage)  She describes her periods as regular, light flow, lasting 3 days since her ablation, however, cramps are intense. .   denies break through bleeding.   denies vaginal itching or irritation.  Denies vaginal discharge.  She is sexually active. She has had 1 partner for the past 21 years .  She uses vasectomy for contraception.   History of abnormal pap: Yes -   Last Pap: approximate date  and was normal  Last MMG: normal--routine follow-up in 12 months  Last Colonoscopy:  None  denies domestic violence. She does feel safe at home.     Past Medical History:   Diagnosis Date    Abnormal Pap smear     cryo for dysplasia    Anxiety     Depression     History of psychiatric care     prozac, lexapro, ativan    Mental disorder     Depression    Psychiatric problem     Therapy      Past Surgical History:   Procedure Laterality Date    AUGMENTATION OF BREAST      saline,     Breast implants 1998    Cryo       DILATION AND CURETTAGE OF UTERUS      DILATION AND CURETTAGE OF UTERUS      ENDOMETRIAL ABLATION      Hysteroscopy with Novasure endometrial ablation     Social History     Socioeconomic History    Marital status:      Spouse name: Not on file    Number of children: Not on file    Years of education: Not on file    Highest education level: Not on file   Occupational History     Employer: [employer]   Social Needs    Financial resource strain: Not on file    Food insecurity:     Worry: Not on file     Inability: Not on file    Transportation needs:     Medical: Not on file     Non-medical: Not on file   Tobacco Use    Smoking status: Former Smoker     Packs/day: 0.50     Years: 5.00     Pack  "years: 2.50     Types: Cigarettes     Last attempt to quit: 10/15/1999     Years since quittin.2    Smokeless tobacco: Never Used   Substance and Sexual Activity    Alcohol use: Yes     Alcohol/week: 2.0 standard drinks     Types: 2 Glasses of wine per week     Comment: social    Drug use: No    Sexual activity: Yes     Partners: Male     Birth control/protection: Surgical, None     Comment: Spouse had Vasectomy,  since    Lifestyle    Physical activity:     Days per week: Not on file     Minutes per session: Not on file    Stress: Not on file   Relationships    Social connections:     Talks on phone: Not on file     Gets together: Not on file     Attends Jehovah's witness service: Not on file     Active member of club or organization: Not on file     Attends meetings of clubs or organizations: Not on file     Relationship status: Not on file   Other Topics Concern    Patient feels they ought to cut down on drinking/drug use Not Asked    Patient annoyed by others criticizing their drinking/drug use Not Asked    Patient has felt bad or guilty about drinking/drug use Not Asked    Patient has had a drink/used drugs as an eye opener in the AM Not Asked   Social History Narrative    - 14 years    From [place]    Works for a law firm- - associates degree    3 children     close with mom     Family History   Problem Relation Age of Onset    Diabetes Father     Stroke Father     Diabetes Mother     Hypertension Mother     Coronary artery disease Mother     Depression Mother     Seizures Mother     Ovarian cancer Maternal Aunt     Breast cancer Maternal Aunt     Breast cancer Paternal Aunt     Colon cancer Neg Hx     Suicide Neg Hx      OB History        4    Para   3    Term   3            AB   1    Living   3       SAB   1    TAB        Ectopic        Multiple        Live Births   3                 /62   Ht 5' 7" (1.702 m)   Wt 65 kg (143 lb 4.8 oz)   LMP " 2019 (Approximate)   BMI 22.44 kg/m²         GYN & OB History  Patient's last menstrual period was 2019 (approximate).   Date of Last Pap: 2018    OB History    Para Term  AB Living   4 3 3   1 3   SAB TAB Ectopic Multiple Live Births   1       3      # Outcome Date GA Lbr Homero/2nd Weight Sex Delivery Anes PTL Lv   4 SAB            3 Term    3.402 kg (7 lb 8 oz) F Vag-Spont   FATOU   2 Term    3.402 kg (7 lb 8 oz) M Vag-Spont   FATOU   1 Term    3.09 kg (6 lb 13 oz) F Vag-Spont   FATOU       Review of Systems  Review of Systems   Constitutional: Negative for activity change, appetite change, fatigue and unexpected weight change.   HENT: Negative.    Eyes: Negative for visual disturbance.   Respiratory: Negative for shortness of breath and wheezing.    Cardiovascular: Negative for chest pain, palpitations and leg swelling.   Gastrointestinal: Negative for abdominal pain, bloating and blood in stool.   Endocrine: Negative for diabetes and hair loss.   Genitourinary: Positive for dysmenorrhea, frequency and urgency. Negative for decreased libido and dyspareunia.   Musculoskeletal: Negative for back pain and joint swelling.   Integumentary:  Negative for acne, hair changes and nipple discharge.   Neurological: Negative for headaches.   Hematological: Does not bruise/bleed easily.   Psychiatric/Behavioral: Negative for depression and sleep disturbance. The patient is not nervous/anxious.    Breast: Negative for mastodynia and nipple discharge          Objective:      Physical Exam:   Constitutional: She is oriented to person, place, and time. She appears well-developed and well-nourished.    HENT:   Head: Normocephalic and atraumatic.    Eyes: Pupils are equal, round, and reactive to light. EOM are normal.    Neck: Normal range of motion. Neck supple.    Cardiovascular: Normal rate and regular rhythm.     Pulmonary/Chest: Effort normal and breath sounds normal.   BREASTS:  no mass, no tenderness,  no deformity and no retraction. Right breast exhibits no inverted nipple, no mass, no nipple discharge, no skin change, no tenderness, no bleeding and no swelling. Left breast exhibits no inverted nipple, no mass, no nipple discharge, no skin change, no tenderness, no bleeding and no swelling. Breasts are symmetrical.      Implants bilaterally.        Abdominal: Soft. Bowel sounds are normal.     Genitourinary: Pelvic exam was performed with patient supine.   Genitourinary Comments: PELVIC: Normal external genitalia without lesions.  Normal hair distribution.  Adequate perineal body, normal urethral meatus.  Vagina moist and well rugated without lesions or discharge.  Cervix pink, without lesions, discharge or tenderness.  No significant cystocele or rectocele.  Bimanual exam shows uterus to be 10-12 weeks size, regular, mobile and nontender.  Adnexa without masses or tenderness.               Musculoskeletal: Normal range of motion and moves all extremeties.       Neurological: She is alert and oriented to person, place, and time.    Skin: Skin is warm and dry.    Psychiatric: She has a normal mood and affect.              Assessment:        1. Encounter for gynecological examination without abnormal finding    2. Screening mammogram, encounter for    3. Dysmenorrhea                Plan:        1. Encounter for gynecological examination without abnormal finding  COUNSELING:  The patient was counseled today on regular weight bearing exercise. Patient was counseled today on the new ACS guidelines for cervical cytology screening as well as the current recommendations for breast cancer screening. Counseling session lasted approximately 10 minutes, and all her questions were answered. She was advised to see her primary care physician for all other health maintenance.   FOLLOW-UP with me for next routine visit.           2. Screening mammogram, encounter for      - Mammo Digital Screening Bilat w/ Pablo; Future    3.  Dysmenorrhea      - naproxen sodium (ANAPROX) 550 MG tablet; Take 1 tablet (550 mg total) by mouth 2 (two) times daily with meals.  Dispense: 60 tablet; Refill: 4       Follow up in about 1 year (around 1/20/2021).

## 2020-03-03 DIAGNOSIS — Z86.69 HX OF MIGRAINES: Primary | ICD-10-CM

## 2020-03-03 RX ORDER — SUMATRIPTAN 50 MG/1
50 TABLET, FILM COATED ORAL ONCE
Qty: 10 TABLET | Refills: 0 | Status: SHIPPED | OUTPATIENT
Start: 2020-03-03 | End: 2020-03-03

## 2020-03-04 ENCOUNTER — TELEPHONE (OUTPATIENT)
Dept: OBSTETRICS AND GYNECOLOGY | Facility: CLINIC | Age: 45
End: 2020-03-04

## 2020-03-04 NOTE — TELEPHONE ENCOUNTER
----- Message from Kamla Chavez MD sent at 3/3/2020  7:40 PM CST -----  Contact: Patient   I sent a rx for imitrex for 10 pills. She has seen neurology in the past, but will need to get established with a PCP for ongoing rx.  Please call her .     ----- Message -----  From: Marily Cooney LPN  Sent: 3/3/2020  11:00 AM CST  To: Kamla Chavez MD    Pt would like a script for her headaches.  ----- Message -----  From: Danie Spangler  Sent: 3/3/2020  10:39 AM CST  To: Kathy Cason A Staff    Patient is calling in stating she is having bad headaches and would like to know if provider can call her in a scrip for the headaches. Patient contact number [phone]

## 2020-03-04 NOTE — TELEPHONE ENCOUNTER
Called pt to inform her that Dr Chavez sent a script for Imitrex to her pharmacy for a 1 time fill. Per Dr Chavez pt will need to get further refills if needed with a PCP. Pt has been informed and states she will.

## 2020-04-27 ENCOUNTER — TELEPHONE (OUTPATIENT)
Dept: INTERNAL MEDICINE | Facility: CLINIC | Age: 45
End: 2020-04-27

## 2020-04-27 NOTE — TELEPHONE ENCOUNTER
Left a detailed message for patient to call back . [facility] is committed to your overall health. Our records indicate that you are due for an annual checkup with your primary care provider, Dr.Christopher Kuo.  Please call [phone] to schedule a routine physical exam.    Thanks for choosing [facility].

## 2020-06-25 ENCOUNTER — OFFICE VISIT (OUTPATIENT)
Dept: INTERNAL MEDICINE | Facility: CLINIC | Age: 45
End: 2020-06-25
Attending: FAMILY MEDICINE
Payer: COMMERCIAL

## 2020-06-25 DIAGNOSIS — G44.52 NEW DAILY PERSISTENT HEADACHE: Primary | ICD-10-CM

## 2020-06-25 PROCEDURE — 99214 PR OFFICE/OUTPT VISIT, EST, LEVL IV, 30-39 MIN: ICD-10-PCS | Mod: 95,,, | Performed by: FAMILY MEDICINE

## 2020-06-25 PROCEDURE — 99214 OFFICE O/P EST MOD 30 MIN: CPT | Mod: 95,,, | Performed by: FAMILY MEDICINE

## 2020-06-25 RX ORDER — BUTALBITAL, ACETAMINOPHEN AND CAFFEINE 50; 325; 40 MG/1; MG/1; MG/1
1 TABLET ORAL EVERY 4 HOURS PRN
Qty: 20 TABLET | Refills: 1 | Status: SHIPPED | OUTPATIENT
Start: 2020-06-25 | End: 2020-07-25

## 2020-06-25 NOTE — PROGRESS NOTES
The patient location is:  [place]  The chief complaint leading to consultation is:  New onset headache    Visit type: audiovisual    Face to Face time with patient:  20 min  Thirty minutes of total time spent on the encounter, which includes face to face time and non-face to face time preparing to see the patient (eg, review of tests), Obtaining and/or reviewing separately obtained history, Documenting clinical information in the electronic or other health record, Independently interpreting results (not separately reported) and communicating results to the patient/family/caregiver, or Care coordination (not separately reported).         Each patient to whom he or she provides medical services by telemedicine is:  (1) informed of the relationship between the physician and patient and the respective role of any other health care provider with respect to management of the patient; and (2) notified that he or she may decline to receive medical services by telemedicine and may withdraw from such care at any time.    Notes: This patient was seen during the COVID-19 pandemic.    CHIEF COMPLAINT:  New onset headaches    HISTORY OF PRESENT ILLNESS: The patient is a very pleasant 44-year-old white female.  Starting about 6 months ago she began to have headaches.  They were initially once a week or so.  They were also fairly mild and responded to over-the-counter medication.  They got bad enough that she sought help and was prescribed sumatriptan which is modestly helpful if it is not a bad headache.  She is also having some nausea but no vomiting.  Having trouble focusing as well.  No personal previous history of significant headaches.  No family history of brain tumors or bleeds.  She does have a history of a significant concussion years ago.    REVIEW OF SYSTEMS:  GENERAL: No fever, chills, fatigability or weight loss.  SKIN: No rashes, itching or changes in color or texture of skin.  HEAD: The patient does have headaches  but no recent head trauma.  EYES: Visual acuity fine. No photophobia, ocular pain or diplopia.  EARS: Denies ear pain, discharge or vertigo.  NOSE: No loss of smell, no epistaxis or postnasal drip.  MOUTH & THROAT: No hoarseness or change in voice. No excessive gum bleeding.  NODES: Denies swollen glands.  CHEST: Denies ROJO, cyanosis, wheezing, cough and sputum production.  CARDIOVASCULAR: Denies chest pain, PND, orthopnea or reduced exercise tolerance.  ABDOMEN: Appetite fine. No weight loss. Denies diarrhea, abdominal pain, hematemesis or blood in stool.    URINARY: No flank pain or hematuria.  PERIPHERAL VASCULAR: No claudication or cyanosis.  MUSCULOSKELETAL: No joint stiffness or swelling. Denies back pain.  NEUROLOGIC: No history of seizures, paralysis, alteration of gait or coordination.    SOCIAL HISTORY: Unchanged since recent note by primary care physician.    PHYSICAL EXAMINATION:   There were no vitals taken for this visit.      APPEARANCE: Well nourished, well developed, in no acute distress.    HEAD: Normocephalic, atraumatic.  EYES: PERRL. EOMI.  Conjunctivae without injection and  anicteric  NEUROLOGIC:       [place] Coma Scale 15      Normal speech development.      Hearing normal.  PSYCHIATRIC: Patient is alert and oriented x3.  Thought processes are all normal.  There is no homicidality.  There is no suicidality.  There is no evidence of psychosis.    LABORATORY/RADIOLOGY:Chart reviewed from the emergency room..    ASSESSMENT:   New onset headache  Depression and anxiety, resolved  Insomnia, mild    PLAN:   Her symptoms are somewhat concerning as they are worsening in frequency and severity.  Stat head CT  Fioricet  Neurology consult      Answers for HPI/ROS submitted by the patient on 6/23/2020   activity change: No  unexpected weight change: Yes  hearing loss: No  rhinorrhea: No  trouble swallowing: No  eye discharge: No  visual disturbance: No  chest tightness: No  wheezing: No  chest pain:  No  palpitations: No  blood in stool: No  constipation: No  vomiting: No  diarrhea: No  polydipsia: No  polyuria: No  difficulty urinating: Yes  hematuria: No  menstrual problem: No  dysuria: No  joint swelling: No  arthralgias: No  headaches: Yes  weakness: Yes  confusion: Yes  dysphoric mood: No

## 2020-06-26 ENCOUNTER — HOSPITAL ENCOUNTER (OUTPATIENT)
Dept: RADIOLOGY | Facility: OTHER | Age: 45
Discharge: HOME | End: 2020-06-26
Attending: FAMILY MEDICINE
Payer: COMMERCIAL

## 2020-06-26 DIAGNOSIS — G44.52 NEW DAILY PERSISTENT HEADACHE: ICD-10-CM

## 2020-06-26 PROCEDURE — 70450 CT HEAD WITHOUT CONTRAST: ICD-10-PCS | Mod: 26,,, | Performed by: RADIOLOGY

## 2020-06-26 PROCEDURE — 70450 CT HEAD/BRAIN W/O DYE: CPT | Mod: TC

## 2020-06-26 PROCEDURE — 70450 CT HEAD/BRAIN W/O DYE: CPT | Mod: 26,,, | Performed by: RADIOLOGY

## 2020-09-02 ENCOUNTER — OFFICE VISIT (OUTPATIENT)
Dept: NEUROLOGY | Facility: CLINIC | Age: 45
End: 2020-09-02
Payer: COMMERCIAL

## 2020-09-02 VITALS — HEIGHT: 67 IN | BODY MASS INDEX: 22.44 KG/M2 | WEIGHT: 143 LBS

## 2020-09-02 DIAGNOSIS — G44.52 NEW DAILY PERSISTENT HEADACHE: ICD-10-CM

## 2020-09-02 PROCEDURE — 3008F BODY MASS INDEX DOCD: CPT | Mod: CPTII,S$GLB,, | Performed by: PSYCHIATRY & NEUROLOGY

## 2020-09-02 PROCEDURE — 3008F PR BODY MASS INDEX (BMI) DOCUMENTED: ICD-10-PCS | Mod: CPTII,S$GLB,, | Performed by: PSYCHIATRY & NEUROLOGY

## 2020-09-02 PROCEDURE — 99999 PR PBB SHADOW E&M-EST. PATIENT-LVL IV: CPT | Mod: PBBFAC,,, | Performed by: PSYCHIATRY & NEUROLOGY

## 2020-09-02 PROCEDURE — 99205 PR OFFICE/OUTPT VISIT, NEW, LEVL V, 60-74 MIN: ICD-10-PCS | Mod: S$GLB,,, | Performed by: PSYCHIATRY & NEUROLOGY

## 2020-09-02 PROCEDURE — 99999 PR PBB SHADOW E&M-EST. PATIENT-LVL IV: ICD-10-PCS | Mod: PBBFAC,,, | Performed by: PSYCHIATRY & NEUROLOGY

## 2020-09-02 PROCEDURE — 99205 OFFICE O/P NEW HI 60 MIN: CPT | Mod: S$GLB,,, | Performed by: PSYCHIATRY & NEUROLOGY

## 2020-09-02 RX ORDER — AMITRIPTYLINE HYDROCHLORIDE 25 MG/1
25 TABLET, FILM COATED ORAL NIGHTLY PRN
Qty: 90 TABLET | Refills: 3 | Status: SHIPPED | OUTPATIENT
Start: 2020-09-02 | End: 2021-08-10

## 2020-09-02 RX ORDER — DICLOFENAC SODIUM 50 MG/1
50 TABLET, DELAYED RELEASE ORAL 2 TIMES DAILY PRN
Qty: 30 TABLET | Refills: 5 | Status: SHIPPED | OUTPATIENT
Start: 2020-09-02 | End: 2020-12-04 | Stop reason: CLARIF

## 2020-09-02 RX ORDER — FINASTERIDE 5 MG/1
TABLET, FILM COATED ORAL
COMMUNITY
Start: 2020-08-06

## 2020-09-02 NOTE — LETTER
September 3, 2020      Jf Kuo MD  [address]  [address]  [facility] [address]           [facility] - Neurology 7th Fl  [address], [address]  [facility] [address]  Phone: [phone]  Fax: [phone]          Patient: Lynne Velasco   MR Number: [id number]   YOB: 1975   Date of Visit: 9/2/2020       Dear Dr. Jf Kuo:    Thank you for referring Lynne Velasco to me for evaluation. Attached you will find relevant portions of my assessment and plan of care.    If you have questions, please do not hesitate to call me. I look forward to following Lynne Velasco along with you.    Sincerely,    Zac Almanza MD    Enclosure  CC:  No Recipients    If you would like to receive this communication electronically, please contact [email or web address] or [phone] to request more information on [employer] access.    For providers and/or their staff who would like to refer a patient to [facility], please contact us through our one-stop-shop provider referral line, [facility], at [phone].    If you feel you have received this communication in error or would no longer like to receive these types of communications, please e-mail [email or web address]

## 2020-09-02 NOTE — PATIENT INSTRUCTIONS
YOU WILL BE CONTACTED BY PHYSICAL THERAPY TO ESTABLISH SESSIONS FOR YOUR NECK    BEGIN SUPPLEMENTATION WITH MAGNESIUM 400MG DAILY OR COMBINATION MIGRAINE SUPPLEMENT SUCH AS PREVENTA MIGRAINE OR MIGRAVENT    CONTINUE IMITREX AND VOLTAREN AS NEEDED    TAKE ELAVIL AT BED TIME AS NEEDED FOR HEADACHE AND INSOMNIA.  CONSIDER TALKING TO GYNECOLOGY ABOUT HORMONE SUPPLEMENTATION TO HELP WITH SLEEP    CHECK LABS TODAY

## 2020-09-03 NOTE — PROGRESS NOTES
[facility] - NEUROLOGY 7TH FL [facility], [place]    Date: September 3, 2020   Patient Name: Lynne Velasco   MRN: [id number]   PCP: Jf Kuo  Referring Provider: Jf Kuo.*    Assessment:      This is Lynne Velasco, 45 y.o. female with mixed migraine and tension headache with exacerbation by sleep disturbance.     Plan:      -  Referral to physical therapy  -  Elavil prn, possible insomnia benefit, advised to discuss hormone therapy with gyn  -  Mg supplement  -  Labs  -  Imitrex, voltaren prn    Follow up 3 months       I discussed side effects of the medications. I asked the patient to stop the medication if she notices serious adverse effects as we discussed and to seek immediate medical attention at an ER.     Zac Almanza MD  [facility]   Department of Neurology    Subjective:        HPI:   Ms. Lynne Velasco is a 45 y.o. female who presents with a chief complaint of headaches    Patient has long history of mixed migraine and tension type headaches with frequent photo/phonophobia and vertigo.  These have been worse over the past several months with near daily headaches in July although some reduction in August.  She has been prescribed imitrex which is helpful but requires frequently and some difficulty due to sedation.  She has significant difficulty maintaining but not initiating sleep with limited effect ambien    PAST MEDICAL HISTORY:  Past Medical History:   Diagnosis Date    Abnormal Pap smear 1993    cryo for dysplasia    Anxiety     Depression     History of psychiatric care     prozac, lexapro, ativan    Mental disorder     Depression    Psychiatric problem     Therapy        PAST SURGICAL HISTORY:  Past Surgical History:   Procedure Laterality Date    AUGMENTATION OF BREAST      saline, 2010    Breast implants 1998 1998    Cryo 1993      DILATION AND CURETTAGE OF UTERUS       DILATION AND CURETTAGE OF UTERUS      ENDOMETRIAL ABLATION      Hysteroscopy with Novasure endometrial ablation       CURRENT MEDS:  Current Outpatient Medications   Medication Sig Dispense Refill    finasteride (PROSCAR) 5 mg tablet TK 1 T PO QD      melatonin 10 mg Cap Take by mouth.      spironolactone (ALDACTONE) 50 MG tablet TK 1 T PO QD FOR 2 WEEKS THEN 2 TS QD FOR 2 WEEKS THEN 3 TS QD THEREAFTER AS TOLERATED  5    sumatriptan (IMITREX) 100 MG tablet Take 1 tablet (100 mg total) by mouth every 2 (two) hours as needed for Migraine. No more than twice in a 24 hour period 10 tablet 11    amitriptyline (ELAVIL) 25 MG tablet Take 1 tablet (25 mg total) by mouth nightly as needed for Insomnia (Headache and insomnia). 90 tablet 3    diclofenac (VOLTAREN) 50 MG EC tablet Take 1 tablet (50 mg total) by mouth 2 (two) times daily as needed (headache). 30 tablet 5    escitalopram oxalate (LEXAPRO) 10 MG tablet escitalopram 10 mg tablet   TK 2 TS PO ONCE DAILY      LORazepam (ATIVAN) 0.5 MG tablet lorazepam 0.5 mg tablet      zolpidem (AMBIEN) 5 MG Tab zolpidem 5 mg tablet       No current facility-administered medications for this visit.        ALLERGIES:  Review of patient's allergies indicates:   Allergen Reactions    Effexor [venlafaxine] Palpitations       FAMILY HISTORY:  Family History   Problem Relation Age of Onset    Diabetes Father     Stroke Father     Diabetes Mother     Hypertension Mother     Coronary artery disease Mother     Depression Mother     Seizures Mother     Ovarian cancer Maternal Aunt     Breast cancer Maternal Aunt     Breast cancer Paternal Aunt     Colon cancer Neg Hx     Suicide Neg Hx        SOCIAL HISTORY:  Social History     Tobacco Use    Smoking status: Former Smoker     Packs/day: 0.50     Years: 5.00     Pack years: 2.50     Types: Cigarettes     Quit date: 10/15/1999     Years since quittin.9    Smokeless tobacco: Never Used   Substance Use  "Topics    Alcohol use: Yes     Alcohol/week: 2.0 standard drinks     Types: 2 Glasses of wine per week     Comment: social    Drug use: No       Review of Systems:  12 review of systems is negative except for the symptoms mentioned in HPI.        Objective:     Vitals:    09/02/20 1401   Weight: 64.9 kg (143 lb)   Height: 5' 7" (1.702 m)       General: NAD, well nourished   Eyes: no tearing, discharge, no erythema   ENT: moist mucous membranes of the oral cavity, nares patent    Neck: TTP over L>R shoulders  Cardiovascular: Warm and well perfused, pulses equal and symmetrical  Lungs: Normal work of breathing, normal chest wall excursions  Skin: No rash, lesions, or breakdown on exposed skin  Psychiatry: Mood and affect are appropriate   Abdomen: soft, non tender, non distended  Extremeties: No cyanosis, clubbing or edema.    Neurological   MENTAL STATUS: Alert and oriented to person, place, and time. Attention and concentration within normal limits. Speech without dysarthria, able to name and repeat without difficulty. Recent and remote memory within normal limits   CRANIAL NERVES: Visual fields intact. PERRL. EOMI. Facial sensation intact. Face symmetrical. Hearing grossly intact. Full shoulder shrug bilaterally. Tongue protrudes midline   SENSORY: Sensation is intact to light touch throughout.  Negative Romberg.   MOTOR: Normal bulk and tone. No pronator drift.    REFLEXES: Symmetric and 2+ throughout.   CEREBELLAR/COORDINATION/GAIT: Gait steady with normal arm swing and stride length.  Heel to shin intact. Finger to nose intact. Normal rapid alternating movements.       "

## 2020-09-04 ENCOUNTER — CLINICAL SUPPORT (OUTPATIENT)
Dept: REHABILITATION | Facility: HOSPITAL | Age: 45
End: 2020-09-04
Attending: PSYCHIATRY & NEUROLOGY
Payer: COMMERCIAL

## 2020-09-04 DIAGNOSIS — G44.221 CHRONIC TENSION-TYPE HEADACHE, INTRACTABLE: Primary | ICD-10-CM

## 2020-09-04 DIAGNOSIS — M62.9 MUSCULOSKELETAL DISORDER INVOLVING UPPER TRAPEZIUS MUSCLE: ICD-10-CM

## 2020-09-04 DIAGNOSIS — G44.52 NEW DAILY PERSISTENT HEADACHE: ICD-10-CM

## 2020-09-04 DIAGNOSIS — M53.82 IMPAIRED RANGE OF MOTION OF CERVICAL SPINE: ICD-10-CM

## 2020-09-04 PROCEDURE — 97110 THERAPEUTIC EXERCISES: CPT | Mod: PO

## 2020-09-04 PROCEDURE — 97161 PT EVAL LOW COMPLEX 20 MIN: CPT | Mod: PO

## 2020-09-04 NOTE — PLAN OF CARE
[facility]  Physical Therapy Neurological Rehabilitation Initial Evaluation    Name: Lynne Velasco  Clinic Number: [id number]    Therapy Diagnosis:   Encounter Diagnoses   Name Primary?    New daily persistent headache     Impaired range of motion of cervical spine     Musculoskeletal disorder involving upper trapezius muscle     Chronic tension-type headache, intractable Yes     Physician: Zac Almanza MD    Physician Orders: PT Eval and Treat  Medical Diagnosis from Referral: New Daily Persistent Headache  Evaluation Date: 9/4/2020  Authorization Period Expiration: 12/31/2020  Plan of Care Expiration: 11/4/2020  Visit # / Visits authorized: 1/ 20    Time In: 730  Time Out: 815  Total Billable Time: 45 minutes    Precautions: Standard    Subjective   Date of onset: about 1 year ago  History of Current Symptoms, Lynne reports: Started with severe migraines years ago, but she was able to get complete relief after physical therapy and acupuncture. This most recent bout of migraines started about a year ago. Recently she is getting migraines about every two days, but originally it was every other day. She is unsure what has caused the change in frequency as her schedule has been stable for years. When she gets a migraine she either has to go to bed or take medicine and they are limiting due to her inability to participate in reminder of day when they start.      Medical History:   Past Medical History:   Diagnosis Date    Abnormal Pap smear 1993    cryo for dysplasia    Anxiety     Depression     History of psychiatric care     prozac, lexapro, ativan    Mental disorder     Depression    Psychiatric problem     Therapy        Surgical History:   Lynne Velasco  has a past surgical history that includes Cryo 1993; Breast implants 1998 (1998); Dilation and curettage of uterus; Dilation and curettage of uterus; Endometrial ablation (July 11,2014); and  "Augmentation of breast.    Medications:   Lynne has a current medication list which includes the following prescription(s): amitriptyline, diclofenac, escitalopram oxalate, finasteride, lorazepam, melatonin, spironolactone, sumatriptan, and zolpidem.    Allergies:   Review of patient's allergies indicates:   Allergen Reactions    Effexor [venlafaxine] Palpitations        Imaging, CT scan films: "Ventricles and sulci are normal in size for age without evidence of hydrocephalus. No extra-axial blood or fluid collections.     The brain parenchyma appears normal. No parenchymal mass, hemorrhage, edema or major vascular distribution infarct.     Skull/extracranial contents (limited evaluation): No fracture. Mastoid air cells and paranasal sinuses are essentially clear." (6/26/2020)    Prior Therapy: yes; PT for migraines last time  Social History: lives with their spouse  Falls: none   DME: none    Exercise Routine / History: HIIT 5-6x/week   Family Present at time of Eval: none   Occupation:  -- working full time  Prior Level of Function: independent without headaches or migraines  Current Level of Function: independent with difficulty thinking, concentrating, and focusing when she gets migraines, sometimes causing her to just go to bed    Pain:  Current 0/10, worst 10/10, best 0/10   Location: headache  Description: Variable  Aggravating Factors: unknown  Easing Factors: unknown    Pts goals: "lessen my headaches"      Objective   - Follows commands: 100% of time   - Speech: no deficits     BP: no issues    Functional Mobility (Bed mobility, transfers)  Bed mobility: I  Supine to sit: I  Sit to supine: I  Transfers to bed: I  Transfers to toilet: I  Sit to stand:  I  Stand pivot:  I  Car transfers: I    Mental status: alert, oriented to person, place, and time, normal mood, behavior, speech, dress, motor activity, and thought processes  Appearance: Casually dressed and Well groomed  Behavior:  calm, " cooperative and adequate rapport can be established  Attention Span and Concentration:  Normal and Grossly intact    Dominant hand:  left     Posture Alignment in sitting:   Head: forward head   Scapulae: slouched posture (rounded shoulders, slight)  Trunk: normal curvature of spine     Sensation: Light Touch: Intact; though pt reports shooting/nerve pain on R side from elbow down occaisionally          Proprioception: NT, Kinesthesia NT         Visual/Auditory: denies changes     ROM:   CERVICAL SPINE  Flexion 65 degrees (80-90 deg)  Extension 52 degrees (70-80 deg)  L side bend 35 degrees, R side bend 35 degrees (20-45 degrees)  L rotation 47 degrees, R rotation 43 degrees (70-90 degrees)  Are concurrent symptoms present with any of these movements: no    UPPER EXTREMITY--AROM/PROM  (R) UE: WFLs  (L) UE: WFLs  Slight winging of B scapula but overall no dyskinesia noted         RANGE OF MOTION--LOWER EXTREMITIES  (R) LE Hip: normal, full and equal bilaterally   Knee: normal, full and equal bilaterally   Ankle: normal, limited by pain and equal bilaterally    (L) LE: Hip: normal, full and equal bilaterally   Knee: normal, full and equal bilaterally   Ankle: normal, limited by pain and equal bilaterally    Strength: manual muscle test grades below   Upper Extremity Strength  (R) UE  (L) UE    Shoulder Flexion: 5/5 Shoulder Flexion: 5/5   Shoulder Abduction: 5/5 Shoulder Abduction: 5/5   Shoulder Extension: 5/5 Shoulder Extension:   5/5   Elbow Flexion: 5/5 Elbow Flexion: 5/5   Low trap 3/5 Low trap 3/5   Mid trap 4-/5 Mid trap 4-/5   Rhomboids 5/5 Rhomboids 5/5   Elbow Extension: 5/5 Elbow Extension: 5/5   Wrist Flexion: 5/5 Wrist Flexion: 5/5   Wrist Extension: 5/5 Wrist Extension: 5/5   : 5/5 : 5/5     Abdominal Strength: 4-/5 (at least)    Flexibility: tightness noted in B upper traps and levator scaps, trigger points noted    PIVM: WFL  Upper trap and levator scap trigger points noted    Endurance  Deficit: none noted       CMS Impairment/Limitation/Restriction for FOTO Intake Survey    Therapist reviewed FOTO scores for Lynne Velasco on 9/4/2020.   FOTO documents entered into [employer] - see Media section.    Limitation Score: 19%  Category: Mobility           TREATMENT   Treatment Time In: 755  Treatment Time Out: 815  Total Treatment time separate from Evaluation: 20 minutes    Lynne received therapeutic exercises to develop strength, endurance, ROM, flexibility and posture for 10 minutes including:  2 x 30 sec upper trap stretch  2 x 30 sec levator scap stretch  3 x 10 reps serratus punch with 3# weight  2 x 10 reps seated chin tuck with 5 sec hold    Lynne participated in manual therapy techniques, including:  Suboccipital release  Manual traction  Trigger point release to upper trap and levator    Home Exercises and Patient Education Provided    Education provided:   - role of PT in migraine reduction  - PT POC    Written Home Exercises Provided: yes.  Exercises were reviewed and Lynne was able to demonstrate them prior to the end of the session.  Lynne demonstrated good  understanding of the education provided.     See EMR under Patient Instructions for exercises provided 9/4/2020.    Assessment   Lynne is a 45 y.o. female referred to outpatient Physical Therapy with a medical diagnosis of new daily persistent headache. Pt presents to PT with difficulty with headaches over the past year. She reports within the last two months they have become every other day to every third day. They are limiting in that when she gets a headache she cannot think straight and cannot really function. Pt works full time and needs to be able to go most days without headache. PT finds during evaluation today limited cervical AROM, weakness in periscapular muscles, and tightness in upper trap and levator scap muscles. 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 Pt will benfit from skilled PT  services 2x/week in order to decrease muscular imbalances and reduce frequency of headaches.     Pt prognosis is Good.   Pt will benefit from skilled outpatient Physical Therapy to address the deficits stated above and in the chart below, provide pt/family education, and to maximize pt's level of independence.     Plan of care discussed with patient: Yes  Pt's spiritual, cultural and educational needs considered and patient is agreeable to the plan of care and goals as stated below:     Anticipated Barriers for therapy: chronicity of condition, psych history    Medical Necessity is demonstrated by the following  History  Co-morbidities and personal factors that may impact the plan of care Co-morbidities:   anxiety and depression    Personal Factors:   no deficits     moderate   Examination  Body Structures and Functions, activity limitations and participation restrictions that may impact the plan of care Body Regions:   head  neck    Body Systems:    ROM  strength  motor control    Participation Restrictions:   Migraines limit ability to think    Activity limitations:   Learning and applying knowledge  no deficits    General Tasks and Commands  no deficits    Communication  no deficits    Mobility  neck ROM    Self care  no deficits    Domestic Life  no deficits    Interactions/Relationships  no deficits    Life Areas  employment    Community and Social Life  recreation and leisure         low   Clinical Presentation stable and uncomplicated low   Decision Making/ Complexity Score: low     Goals:  Short Term Goals: 4 weeks   1. Pt will be independent with HEP.   2. Pt will improve cervical flexion to at least 75 degrees in order to demonstrate decreased neck tension.  3. Pt will improve cervical rotation B to at least 55 degrees in order to improve ability to turn and drive.   4. Pt will improve low trap MMT bilaterally to at least 4-/5 in order to reduce tension on upper traps.     Long Term Goals: 8 weeks   1. Pt  will be indepdnent with advanced HEP in order to empower pt for success following discharge.   2. Pt will report </= 15% limitation using FOTO assessment tool in order to decrease perception of limitations.   3. Pt will report decrease in frequency of headaches to no more than once per week in order to improve QOL.   4. Pt will improve cervical extension to at least 65 degrees in order to demonstrate improved neck movement.     Plan   Plan of care Certification: 9/4/2020 to 11/4/2020.    Outpatient Physical Therapy 2 times weekly for 8 weeks to include the following interventions: Cervical/Lumbar Traction, Manual Therapy, Moist Heat/ Ice, Neuromuscular Re-ed, Therapeutic Exercise and dry needling .     Herman Adame, PT

## 2020-09-10 NOTE — PROGRESS NOTES
Physical Therapy Treatment Note     Name: Lynne Velasco  Clinic Number: [id number]    Therapy Diagnosis:   Encounter Diagnoses   Name Primary?    Impaired range of motion of cervical spine Yes    Musculoskeletal disorder involving upper trapezius muscle     Chronic tension-type headache, intractable      Physician: Zac Almanza MD    Visit Date: 9/11/2020    Physician Orders: PT Eval and Treat  Medical Diagnosis from Referral: New Daily Persistent Headache  Evaluation Date: 9/4/2020  Authorization Period Expiration: 12/31/2020  Plan of Care Expiration: 11/4/2020  Visit # / Visits authorized: 2/ 20     Time In: 730  Time Out: 810  Total Billable Time: 40 minutes    Precautions: Standard    Subjective     Pt reports: She hasn't had as many headaches in the last week but the evening following our evaluation she had a really bad headache. She has not kept up with HEP as much as she probably should.    She was semi-compliant with home exercise program.    Response to previous treatment: had a headache later that afternoon  Functional change: less headache in the last week    Pain: 0/10  Location: n/a     Objective     Lynne received therapeutic exercises to develop strength, endurance, ROM, flexibility and posture for 30 minutes including:  Seated:   2 x 30 sec upper trap stretch  2 x 30 sec levator scap stretch  2 x 10 reps seated chin tuck with 5 sec hold    Supine:   3 x 10 reps serratus punch with 3# weight    Side lying:   3 x 10 shoulder ER with 2 # dumbbell  3 x 10 shoulder flexion with 2 # dumbbell  x10 reps ea side open books for thoracic mobility    Prone:  2 x 10 I's for shoulder extension strengthening  2 x 10 T's (thumbs up) for middle trap strengthening  2 x 10 Y's for lower trap strengthening    Lynne received the following manual therapy techniques: Manual traction, Myofacial release and Soft tissue Mobilization were applied to the: cervical spine for 10 minutes,  including:  Suboccipital release  Manual traction  Trigger point release to upper trap and levator      Home Exercises Provided and Patient Education Provided     Education provided:   - compliance with HEP    Written Home Exercises Provided: Patient instructed to cont prior HEP.  Exercises were reviewed and Lynne was able to demonstrate them prior to the end of the session.  Lynne demonstrated good  understanding of the education provided.     See EMR under Patient Instructions for exercises provided prior visit.    Assessment     Lynne did well in today's session. She tolerated addition of strengthening exercises for sami-scapular muscles well today with reports of normal muscular fatigue following. She does require cueing when performing prone activities to ensure no compensatory movements are used. She remains appropriate for skilled PT services.   Lynne is progressing well towards her goals.   Pt prognosis is Good.     Pt will continue to benefit from skilled outpatient physical therapy to address the deficits listed in the problem list box on initial evaluation, provide pt/family education and to maximize pt's level of independence in the home and community environment.     Pt's spiritual, cultural and educational needs considered and pt agreeable to plan of care and goals.     Anticipated barriers to physical therapy: chronicity of headaches    Goals:  Short Term Goals: 4 weeks   1. Pt will be independent with HEP. ongoing  2. Pt will improve cervical flexion to at least 75 degrees in order to demonstrate decreased neck tension. ongoing  3. Pt will improve cervical rotation B to at least 55 degrees in order to improve ability to turn and drive. ongoing  4. Pt will improve low trap MMT bilaterally to at least 4-/5 in order to reduce tension on upper traps. ongoing     Long Term Goals: 8 weeks   1. Pt will be indepdnent with advanced HEP in order to empower pt for success following discharge.  ongoing  2. Pt will report </= 15% limitation using FOTO assessment tool in order to decrease perception of limitations. ongoing  3. Pt will report decrease in frequency of headaches to no more than once per week in order to improve QOL. ongoing  4. Pt will improve cervical extension to at least 65 degrees in order to demonstrate improved neck movement. ongoing     Plan     Plan of care Certification: 9/4/2020 to 11/4/2020.    Continue to work on periscapular strengthening, increasing cervical ROM, and decreasing frequency of headaches.    Herman Adame, PT

## 2020-09-11 ENCOUNTER — CLINICAL SUPPORT (OUTPATIENT)
Dept: REHABILITATION | Facility: HOSPITAL | Age: 45
End: 2020-09-11
Attending: PSYCHIATRY & NEUROLOGY
Payer: COMMERCIAL

## 2020-09-11 DIAGNOSIS — M62.9 MUSCULOSKELETAL DISORDER INVOLVING UPPER TRAPEZIUS MUSCLE: ICD-10-CM

## 2020-09-11 DIAGNOSIS — M53.82 IMPAIRED RANGE OF MOTION OF CERVICAL SPINE: Primary | ICD-10-CM

## 2020-09-11 DIAGNOSIS — G44.221 CHRONIC TENSION-TYPE HEADACHE, INTRACTABLE: ICD-10-CM

## 2020-09-11 PROCEDURE — 97110 THERAPEUTIC EXERCISES: CPT | Mod: PO

## 2020-09-11 PROCEDURE — 97140 MANUAL THERAPY 1/> REGIONS: CPT | Mod: PO

## 2020-09-18 ENCOUNTER — CLINICAL SUPPORT (OUTPATIENT)
Dept: REHABILITATION | Facility: HOSPITAL | Age: 45
End: 2020-09-18
Attending: PSYCHIATRY & NEUROLOGY
Payer: COMMERCIAL

## 2020-09-18 DIAGNOSIS — M62.9 MUSCULOSKELETAL DISORDER INVOLVING UPPER TRAPEZIUS MUSCLE: ICD-10-CM

## 2020-09-18 DIAGNOSIS — G44.221 CHRONIC TENSION-TYPE HEADACHE, INTRACTABLE: ICD-10-CM

## 2020-09-18 DIAGNOSIS — M53.82 IMPAIRED RANGE OF MOTION OF CERVICAL SPINE: ICD-10-CM

## 2020-09-18 PROCEDURE — 97140 MANUAL THERAPY 1/> REGIONS: CPT | Mod: CQ

## 2020-09-18 PROCEDURE — 97110 THERAPEUTIC EXERCISES: CPT | Mod: CQ

## 2020-09-18 NOTE — PROGRESS NOTES
"    Physical Therapy Treatment Note     Name: Lynne Velasco  Clinic Number: [id number]    Therapy Diagnosis:   Encounter Diagnoses   Name Primary?    Impaired range of motion of cervical spine     Musculoskeletal disorder involving upper trapezius muscle     Chronic tension-type headache, intractable      Physician: Zac Almanza MD    Visit Date: 9/18/2020    Physician Orders: PT Eval and Treat  Medical Diagnosis from Referral: New Daily Persistent Headache  Evaluation Date: 9/4/2020  Authorization Period Expiration: 12/31/2020  Plan of Care Expiration: 11/4/2020  Visit # / Visits authorized: 3/ 20     Time In: 0845  Time Out: 0930  Total Billable Time: 45 minutes    Precautions: Standard    Subjective     Pt reports: last headache about 2 days ago. Mild pain in neck today  She was semi-compliant with home exercise program.  Response to previous treatment: sorness   Functional change: less headache in the last week    Pain: 4/10  Location: n/a     Objective     Lynne received therapeutic exercises to develop strength, endurance, ROM, flexibility and posture for 35 minutes including:  UBE 4'/4' with moist heat   Supine chin tucks 30x/3"  Upper trap stretch B 2x/30"  Levator scap stretch B 2x/30"  Incline plinth serratus press 3x10  Doorway pec stretch 3x20"  GTB scap retraction 3x10/3"   Quadruped B thoracic rotation 10x     Lynne received the following manual therapy techniques: Manual traction, Myofacial release and Soft tissue Mobilization were applied to the: cervical spine for 10 minutes, including:  Suboccipital release, manual cervical traction, UT and levator stretch       Home Exercises Provided and Patient Education Provided     Education provided:   - compliance with HEP    Written Home Exercises Provided: Patient instructed to cont prior HEP.  Exercises were reviewed and Lynne was able to demonstrate them prior to the end of the session.  Lynne demonstrated good  " understanding of the education provided.     See EMR under Patient Instructions for exercises provided prior visit.    Assessment   Pt tolerating tx well. No increased neck pain during tx and noted decreased pain after therapy. VC/TC for correcting form/technique with therex. She remains appropriate for skilled PT services.   Lynne is progressing well towards her goals.   Pt prognosis is Good.     Pt will continue to benefit from skilled outpatient physical therapy to address the deficits listed in the problem list box on initial evaluation, provide pt/family education and to maximize pt's level of independence in the home and community environment.     Pt's spiritual, cultural and educational needs considered and pt agreeable to plan of care and goals.     Anticipated barriers to physical therapy: chronicity of headaches    Goals:  Short Term Goals: 4 weeks   1. Pt will be independent with HEP. ongoing  2. Pt will improve cervical flexion to at least 75 degrees in order to demonstrate decreased neck tension. ongoing  3. Pt will improve cervical rotation B to at least 55 degrees in order to improve ability to turn and drive. ongoing  4. Pt will improve low trap MMT bilaterally to at least 4-/5 in order to reduce tension on upper traps. ongoing     Long Term Goals: 8 weeks   1. Pt will be indepdnent with advanced HEP in order to empower pt for success following discharge. ongoing  2. Pt will report </= 15% limitation using FOTO assessment tool in order to decrease perception of limitations. ongoing  3. Pt will report decrease in frequency of headaches to no more than once per week in order to improve QOL. ongoing  4. Pt will improve cervical extension to at least 65 degrees in order to demonstrate improved neck movement. ongoing     Plan     Plan of care Certification: 9/4/2020 to 11/4/2020.    Continue to work on periscapular strengthening, increasing cervical ROM, and decreasing frequency of headaches.    Niels  P Aurelio, PTA, STS

## 2020-09-25 ENCOUNTER — CLINICAL SUPPORT (OUTPATIENT)
Dept: REHABILITATION | Facility: HOSPITAL | Age: 45
End: 2020-09-25
Attending: PSYCHIATRY & NEUROLOGY
Payer: COMMERCIAL

## 2020-09-25 DIAGNOSIS — M53.82 IMPAIRED RANGE OF MOTION OF CERVICAL SPINE: ICD-10-CM

## 2020-09-25 DIAGNOSIS — M62.9 MUSCULOSKELETAL DISORDER INVOLVING UPPER TRAPEZIUS MUSCLE: ICD-10-CM

## 2020-09-25 DIAGNOSIS — G44.221 CHRONIC TENSION-TYPE HEADACHE, INTRACTABLE: ICD-10-CM

## 2020-09-25 PROCEDURE — 97112 NEUROMUSCULAR REEDUCATION: CPT

## 2020-09-25 PROCEDURE — 97140 MANUAL THERAPY 1/> REGIONS: CPT

## 2020-09-25 NOTE — PROGRESS NOTES
Physical Therapy Treatment Note     Name: Lynne Velasco  Clinic Number: [id number]    Therapy Diagnosis:   Encounter Diagnoses   Name Primary?    Impaired range of motion of cervical spine     Musculoskeletal disorder involving upper trapezius muscle     Chronic tension-type headache, intractable      Physician: Zac Almanza MD    Visit Date: 9/25/2020    Physician Orders: PT Eval and Treat  Medical Diagnosis from Referral: New Daily Persistent Headache  Evaluation Date: 9/4/2020  Authorization Period Expiration: 12/31/2020  Plan of Care Expiration: 11/4/2020  Visit # / Visits authorized: 4/ 20     Time In: 0730  Time Out: 0820  Total Billable Time: 45 minutes    Precautions: Standard    Subjective     Pt reports: She has still been having headaches however the frequency has decreased.  She was semi-compliant with home exercise program.  Response to previous treatment: sorness   Functional change: less headache in the last week    Pain: 4/10  Location: n/a     Objective     Cervical joint mobility: Hypomobile C0/C1 flexion, C1 opening limited      Palpation: No note able guarding in the sib occipitals      Special testing:  Transverse ligament stress test - negative  Alar ligament stress test - negative  Michel's fracture - negative  Vertebral artery stress test  - negative    Lynne received neuromuscular re-education to develop motor coordination and posture for 35 minutes including:  -Deep neck flexor training with BP cuff   Deep neck flexor training with BP cuff and alternat shoulder elevation  Chin tuck with rotation  Chin tuck and cervical flexion with rotation    Lynne received the following manual therapy techniques: Manual traction, Myofacial release and Soft tissue Mobilization were applied to the: cervical spine for 10 minutes, including:  Assessment of joint mobilibility and ligamentous stress testing  Capital flexion mobilization   Left C0/C1 opening MET      Home  Exercises Provided and Patient Education Provided     Education provided:   - compliance with HEP    Written Home Exercises Provided: Patient instructed to cont prior HEP.  Exercises were reviewed and Lynne was able to demonstrate them prior to the end of the session.  Lynne demonstrated good  understanding of the education provided.     See EMR under Patient Instructions for exercises provided 9/25/2020.    Assessment   Head ache increased with C1/C2 opening mobilization. Evidence for cervicogenic headache however she reports multiple distribution of headaches so there may be other underlying diagnoses. Will have to progress slowly due to irritability.    Lynne is progressing well towards her goals.   Pt prognosis is Good.     Pt will continue to benefit from skilled outpatient physical therapy to address the deficits listed in the problem list box on initial evaluation, provide pt/family education and to maximize pt's level of independence in the home and community environment.     Pt's spiritual, cultural and educational needs considered and pt agreeable to plan of care and goals.     Anticipated barriers to physical therapy: chronicity of headaches    Goals:  Short Term Goals: 4 weeks   1. Pt will be independent with HEP. ongoing  2. Pt will improve cervical flexion to at least 75 degrees in order to demonstrate decreased neck tension. ongoing  3. Pt will improve cervical rotation B to at least 55 degrees in order to improve ability to turn and drive. ongoing  4. Pt will improve low trap MMT bilaterally to at least 4-/5 in order to reduce tension on upper traps. ongoing     Long Term Goals: 8 weeks   1. Pt will be indepdnent with advanced HEP in order to empower pt for success following discharge. ongoing  2. Pt will report </= 15% limitation using FOTO assessment tool in order to decrease perception of limitations. ongoing  3. Pt will report decrease in frequency of headaches to no more than once per  week in order to improve QOL. ongoing  4. Pt will improve cervical extension to at least 65 degrees in order to demonstrate improved neck movement. ongoing     Plan     Plan of care Certification: 9/4/2020 to 11/4/2020.    Continue to work on periscapular strengthening, increasing cervical ROM, and decreasing frequency of headaches.    Francesco Stephens, PT, DPT

## 2020-09-28 ENCOUNTER — CLINICAL SUPPORT (OUTPATIENT)
Dept: REHABILITATION | Facility: HOSPITAL | Age: 45
End: 2020-09-28
Attending: PSYCHIATRY & NEUROLOGY
Payer: COMMERCIAL

## 2020-09-28 DIAGNOSIS — M53.82 IMPAIRED RANGE OF MOTION OF CERVICAL SPINE: ICD-10-CM

## 2020-09-28 DIAGNOSIS — M62.9 MUSCULOSKELETAL DISORDER INVOLVING UPPER TRAPEZIUS MUSCLE: ICD-10-CM

## 2020-09-28 DIAGNOSIS — G44.221 CHRONIC TENSION-TYPE HEADACHE, INTRACTABLE: ICD-10-CM

## 2020-09-28 PROCEDURE — 97140 MANUAL THERAPY 1/> REGIONS: CPT

## 2020-09-28 PROCEDURE — 97112 NEUROMUSCULAR REEDUCATION: CPT

## 2020-09-28 NOTE — PROGRESS NOTES
Physical Therapy Treatment Note     Name: Lynne Velasco  Clinic Number: [id number]    Therapy Diagnosis:   Encounter Diagnoses   Name Primary?    Impaired range of motion of cervical spine     Musculoskeletal disorder involving upper trapezius muscle     Chronic tension-type headache, intractable      Physician: Zac Almanza MD    Visit Date: 9/28/2020    Physician Orders: PT Eval and Treat  Medical Diagnosis from Referral: New Daily Persistent Headache  Evaluation Date: 9/4/2020  Authorization Period Expiration: 12/31/2020  Plan of Care Expiration: 11/4/2020  Visit # / Visits authorized: 5/ 20     Time In: 0730  Time Out: 0810  Total Billable Time: 40 minutes    Precautions: Standard    Subjective     Pt reports: She had headaches for the rest of the day following Fridays treatment. She did not complete her HEP due to pain.     She was semi-compliant with home exercise program.  Response to previous treatment: sorness   Functional change: less headache in the last week    Pain: 0/10  Location: n/a     Objective     Cervical joint mobility: Closing restriction C2/3 to left     Special testing:  Transverse ligament stress test - negative  Alar ligament stress test - negative  Michel's fracture - negative  Vertebral artery stress test  - negative    Lynne received neuromuscular re-education to develop motor coordination and posture for 30 minutes including:  -Deep neck flexor training with BP cuff   Deep neck flexor training with BP cuff and alternat shoulder elevation  Chin tuck with rotation  Chin tuck and cervical flexion with rotation  Pec minor stretching with cues for     Lynne received the following manual therapy techniques: Manual traction, Myofacial release and Soft tissue Mobilization were applied to the: cervical spine for 10 minutes, including:  Assessment of joint mobilibility and ligamentous stress testing  Capital flexion mobilization   Left C2 closing MET  Thoracic  spine Grade V thrust manipulation    Home Exercises Provided and Patient Education Provided     Education provided:   - compliance with HEP    Written Home Exercises Provided: Patient instructed to cont prior HEP.  Exercises were reviewed and Lynne was able to demonstrate them prior to the end of the session.  Lynne demonstrated good  understanding of the education provided.     See EMR under Patient Instructions for exercises provided 9/25/2020.    Assessment   Reduced intensity of upper cervical mobilization today due to headaches last session. Closing restriction on left triggers head aches when mobilized. Patient was not compliant with HEP due to head ache. Reinforced importance of compliance to improve. Will continue to progress slowly.    Lynne is progressing well towards her goals.   Pt prognosis is Good.     Pt will continue to benefit from skilled outpatient physical therapy to address the deficits listed in the problem list box on initial evaluation, provide pt/family education and to maximize pt's level of independence in the home and community environment.     Pt's spiritual, cultural and educational needs considered and pt agreeable to plan of care and goals.     Anticipated barriers to physical therapy: chronicity of headaches    Goals:  Short Term Goals: 4 weeks   1. Pt will be independent with HEP. Met  2. Pt will improve cervical flexion to at least 75 degrees in order to demonstrate decreased neck tension. ongoing  3. Pt will improve cervical rotation B to at least 55 degrees in order to improve ability to turn and drive. ongoing  4. Pt will improve low trap MMT bilaterally to at least 4-/5 in order to reduce tension on upper traps. ongoing     Long Term Goals: 8 weeks   1. Pt will be indepdnent with advanced HEP in order to empower pt for success following discharge. ongoing  2. Pt will report </= 15% limitation using FOTO assessment tool in order to decrease perception of limitations.  ongoing  3. Pt will report decrease in frequency of headaches to no more than once per week in order to improve QOL. ongoing  4. Pt will improve cervical extension to at least 65 degrees in order to demonstrate improved neck movement. ongoing     Plan     Plan of care Certification: 9/4/2020 to 11/4/2020.    Continue to work on periscapular strengthening, increasing cervical ROM, and decreasing frequency of headaches.    Francesco Stephens, PT, DPT

## 2020-10-02 ENCOUNTER — CLINICAL SUPPORT (OUTPATIENT)
Dept: REHABILITATION | Facility: HOSPITAL | Age: 45
End: 2020-10-02
Attending: PSYCHIATRY & NEUROLOGY
Payer: COMMERCIAL

## 2020-10-02 DIAGNOSIS — M53.82 IMPAIRED RANGE OF MOTION OF CERVICAL SPINE: ICD-10-CM

## 2020-10-02 DIAGNOSIS — G44.221 CHRONIC TENSION-TYPE HEADACHE, INTRACTABLE: ICD-10-CM

## 2020-10-02 DIAGNOSIS — M62.9 MUSCULOSKELETAL DISORDER INVOLVING UPPER TRAPEZIUS MUSCLE: ICD-10-CM

## 2020-10-02 PROCEDURE — 97140 MANUAL THERAPY 1/> REGIONS: CPT

## 2020-10-02 PROCEDURE — 97112 NEUROMUSCULAR REEDUCATION: CPT

## 2020-10-02 NOTE — PROGRESS NOTES
Physical Therapy Treatment Note     Name: Lynne Velasco  Clinic Number: [id number]    Therapy Diagnosis:   Encounter Diagnoses   Name Primary?    Impaired range of motion of cervical spine     Musculoskeletal disorder involving upper trapezius muscle     Chronic tension-type headache, intractable      Physician: Zac Almanza MD    Visit Date: 10/2/2020    Physician Orders: PT Eval and Treat  Medical Diagnosis from Referral: New Daily Persistent Headache  Evaluation Date: 9/4/2020  Authorization Period Expiration: 12/31/2020  Plan of Care Expiration: 11/4/2020  Visit # / Visits authorized: 6/ 20     Time In: 0730  Time Out: 0815  Total Billable Time: 45 minutes    Precautions: Standard    Subjective     Pt reports: Decreased headaches since the previous session. No increase following treatment last session.      She was semi-compliant with home exercise program.  Response to previous treatment: sorness   Functional change: less headache in the last week    Pain: 0/10  Location: n/a     Objective     Cervical joint mobility: Closing restriction C2/3 to left     Special testing:  Transverse ligament stress test - negative  Alar ligament stress test - negative  Michel's fracture - negative  Vertebral artery stress test  - negative    Lynne received neuromuscular re-education to develop motor coordination and posture for 30 minutes including:  -Deep neck flexor training with BP cuff   Deep neck flexor training with BP cuff and alternat shoulder elevation, 2 lbs  Chin tuck and cervical flexion with rotation  Pec minor stretching with cues for scapular retraction  FMP for cervicothoracic flexion 5x    Lynne received the following manual therapy techniques: Manual traction, Myofacial release and Soft tissue Mobilization were applied to the: cervical spine for 15 minutes, including:  Assessment of joint mobilibility and ligamentous stress testing  Capital flexion mobilization   Left C2  closing MET  Thoracic spine flexion grade IV    Home Exercises Provided and Patient Education Provided     Education provided:   - compliance with HEP    Written Home Exercises Provided: Patient instructed to cont prior HEP.  Exercises were reviewed and Lynne was able to demonstrate them prior to the end of the session.  Lynne demonstrated good  understanding of the education provided.     See EMR under Patient Instructions for exercises provided 9/25/2020.    Assessment   Able to tolerate manual treatments much better this session. Most benefit from MET for C1/C3 rotation to left. Challenged by deep neck flexor strengthening with over head motion and 2 lbs. CT flexion very limited and she brandyn through her lower cervical vertebrae. Can continue to improve thoracic mobility, CT junction flexion, and pec minor length.    Lynne is progressing well towards her goals.   Pt prognosis is Good.     Pt will continue to benefit from skilled outpatient physical therapy to address the deficits listed in the problem list box on initial evaluation, provide pt/family education and to maximize pt's level of independence in the home and community environment.     Pt's spiritual, cultural and educational needs considered and pt agreeable to plan of care and goals.     Anticipated barriers to physical therapy: chronicity of headaches    Goals:  Short Term Goals: 4 weeks   1. Pt will be independent with HEP. Met  2. Pt will improve cervical flexion to at least 75 degrees in order to demonstrate decreased neck tension. ongoing  3. Pt will improve cervical rotation B to at least 55 degrees in order to improve ability to turn and drive. ongoing  4. Pt will improve low trap MMT bilaterally to at least 4-/5 in order to reduce tension on upper traps. ongoing     Long Term Goals: 8 weeks   1. Pt will be indepdnent with advanced HEP in order to empower pt for success following discharge. ongoing  2. Pt will report </= 15% limitation  using FOTO assessment tool in order to decrease perception of limitations. ongoing  3. Pt will report decrease in frequency of headaches to no more than once per week in order to improve QOL. ongoing  4. Pt will improve cervical extension to at least 65 degrees in order to demonstrate improved neck movement. ongoing     Plan     Plan of care Certification: 9/4/2020 to 11/4/2020.    Continue to work on periscapular strengthening, increasing cervical ROM, and decreasing frequency of headaches.    Francesco Stephens, PT, DPT

## 2020-10-24 ENCOUNTER — HOSPITAL ENCOUNTER (EMERGENCY)
Facility: HOSPITAL | Age: 45
Discharge: HOME | End: 2020-10-25
Attending: EMERGENCY MEDICINE
Payer: COMMERCIAL

## 2020-10-24 VITALS
SYSTOLIC BLOOD PRESSURE: 140 MMHG | BODY MASS INDEX: 21.97 KG/M2 | HEIGHT: 67 IN | HEART RATE: 115 BPM | RESPIRATION RATE: 20 BRPM | WEIGHT: 140 LBS | DIASTOLIC BLOOD PRESSURE: 90 MMHG | OXYGEN SATURATION: 100 % | TEMPERATURE: 97 F

## 2020-10-24 DIAGNOSIS — R33.9 URINARY RETENTION: Primary | ICD-10-CM

## 2020-10-24 LAB
B-HCG UR QL: NEGATIVE
BASOPHILS # BLD AUTO: 0.09 K/UL (ref 0–0.2)
BASOPHILS NFR BLD: 0.5 % (ref 0–1.9)
BILIRUB UR QL STRIP: NEGATIVE
BUN SERPL-MCNC: 14 MG/DL (ref 6–30)
CHLORIDE SERPL-SCNC: 106 MMOL/L (ref 95–110)
CLARITY UR REFRACT.AUTO: CLEAR
COLOR UR AUTO: NORMAL
CREAT SERPL-MCNC: 1.1 MG/DL (ref 0.5–1.4)
CTP QC/QA: YES
DIFFERENTIAL METHOD: ABNORMAL
EOSINOPHIL # BLD AUTO: 0 K/UL (ref 0–0.5)
EOSINOPHIL NFR BLD: 0.1 % (ref 0–8)
ERYTHROCYTE [DISTWIDTH] IN BLOOD BY AUTOMATED COUNT: 12.4 % (ref 11.5–14.5)
GLUCOSE SERPL-MCNC: 108 MG/DL (ref 70–110)
GLUCOSE UR QL STRIP: NEGATIVE
HCT VFR BLD AUTO: 40.3 % (ref 37–48.5)
HCT VFR BLD CALC: 40 %PCV (ref 36–54)
HGB BLD-MCNC: 13.5 G/DL (ref 12–16)
HGB UR QL STRIP: NEGATIVE
IMM GRANULOCYTES # BLD AUTO: 0.08 K/UL (ref 0–0.04)
IMM GRANULOCYTES NFR BLD AUTO: 0.4 % (ref 0–0.5)
KETONES UR QL STRIP: NEGATIVE
LEUKOCYTE ESTERASE UR QL STRIP: NEGATIVE
LYMPHOCYTES # BLD AUTO: 1.5 K/UL (ref 1–4.8)
LYMPHOCYTES NFR BLD: 8 % (ref 18–48)
MCH RBC QN AUTO: 29.5 PG (ref 27–31)
MCHC RBC AUTO-ENTMCNC: 33.5 G/DL (ref 32–36)
MCV RBC AUTO: 88 FL (ref 82–98)
MONOCYTES # BLD AUTO: 1.1 K/UL (ref 0.3–1)
MONOCYTES NFR BLD: 5.9 % (ref 4–15)
NEUTROPHILS # BLD AUTO: 15.3 K/UL (ref 1.8–7.7)
NEUTROPHILS NFR BLD: 85.1 % (ref 38–73)
NITRITE UR QL STRIP: NEGATIVE
NRBC BLD-RTO: 0 /100 WBC
PH UR STRIP: 5 [PH] (ref 5–8)
PLATELET # BLD AUTO: 354 K/UL (ref 150–350)
PMV BLD AUTO: 9 FL (ref 9.2–12.9)
POC IONIZED CALCIUM: 1.17 MMOL/L (ref 1.06–1.42)
POC TCO2 (MEASURED): 20 MMOL/L (ref 23–29)
POTASSIUM BLD-SCNC: 3.9 MMOL/L (ref 3.5–5.1)
PROT UR QL STRIP: NEGATIVE
RBC # BLD AUTO: 4.58 M/UL (ref 4–5.4)
SAMPLE: ABNORMAL
SODIUM BLD-SCNC: 140 MMOL/L (ref 136–145)
SP GR UR STRIP: 1 (ref 1–1.03)
URN SPEC COLLECT METH UR: NORMAL
WBC # BLD AUTO: 18.04 K/UL (ref 3.9–12.7)

## 2020-10-24 PROCEDURE — 99284 EMERGENCY DEPT VISIT MOD MDM: CPT | Mod: ,,, | Performed by: EMERGENCY MEDICINE

## 2020-10-24 PROCEDURE — 81003 URINALYSIS AUTO W/O SCOPE: CPT

## 2020-10-24 PROCEDURE — 51702 INSERT TEMP BLADDER CATH: CPT

## 2020-10-24 PROCEDURE — 99283 EMERGENCY DEPT VISIT LOW MDM: CPT | Mod: 25

## 2020-10-24 PROCEDURE — 81025 URINE PREGNANCY TEST: CPT | Performed by: EMERGENCY MEDICINE

## 2020-10-24 PROCEDURE — 99284 PR EMERGENCY DEPT VISIT,LEVEL IV: ICD-10-PCS | Mod: ,,, | Performed by: EMERGENCY MEDICINE

## 2020-10-24 PROCEDURE — 51798 US URINE CAPACITY MEASURE: CPT

## 2020-10-24 PROCEDURE — 80047 BASIC METABLC PNL IONIZED CA: CPT

## 2020-10-24 PROCEDURE — 85025 COMPLETE CBC W/AUTO DIFF WBC: CPT

## 2020-10-24 RX ORDER — OXYBUTYNIN CHLORIDE 10 MG/1
10 TABLET, EXTENDED RELEASE ORAL DAILY
Status: DISCONTINUED | OUTPATIENT
Start: 2020-10-25 | End: 2020-10-24

## 2020-10-24 RX ORDER — OXYBUTYNIN CHLORIDE 10 MG/1
10 TABLET, EXTENDED RELEASE ORAL
Status: DISCONTINUED | OUTPATIENT
Start: 2020-10-25 | End: 2020-10-24

## 2020-10-25 RX ORDER — BETHANECHOL CHLORIDE 5 MG/1
5 TABLET ORAL 3 TIMES DAILY
Qty: 42 TABLET | Refills: 0 | Status: SHIPPED | OUTPATIENT
Start: 2020-10-25 | End: 2020-12-04 | Stop reason: CLARIF

## 2020-10-25 NOTE — ED NOTES
I-STAT Chem-8+ Results:   Value Reference Range   Sodium 140 136-145 mmol/L   Potassium  3.9 3.5-5.1 mmol/L   Chloride 106  mmol/L   Ionized Calcium 1.17 1.06-1.42 mmol/L   CO2 (measured) 20 23-29 mmol/L   Glucose 108  mg/dL   BUN 14 6-30 mg/dL   Creatinine 1.1 0.5-1.4 mg/dL   Hematocrit 40 36-54%

## 2020-10-25 NOTE — ED NOTES
Lynne Velasco, a 45 y.o. female presents to the ED via personal transport from home with CC urinary retention.       Patient identifiers verified verbally with pateitn and correct for Lynne Velasco.    LOC/ APPEARANCE: The patient is AAOx4. Pt is speaking appropriately, no slurred speech. Pt changed into hospital gown. Continuous cardiac monitor, cont pulse ox, and auto BP cuff applied to patient. Pt is clean and well groomed. No JVD visible. Pt reports pain level of 0. Pt updated on POC. Bed low and locked with side rails up x2, call bell in pt reach.  SKIN: Skin is warm dry and intact, and color is consistent with ethnicity. Capillary refill <3 seconds. No breakdown or brusing visible. Mucus membranes moist, acyanotic.  RESPIRATORY: Airway is open and patent. Respirations-spontaneous, unlabored, regular rate, equal bilaterally on inspiration and expiration. No accessory muscle use noted. Lungs clear to auscultation in all fields bilaterally anterior and posterior.   CARDIAC: Patient has regular heart rate.  No peripheral edema noted, and patient has no c/o chest pain. Peripheral pulses present equal and strong throughout.  ABDOMEN: Soft and non-tender to palpation with no distention noted. Normoactive bowel sounds x4 quadrants. Pt has no complaints of abnormal bowel movements, denies blood in stool. Pt reports normal appetite.   NEUROLOGIC: Eyes open spontaneously and facial expression symmetrical. Pt behavior appropriate to situation, and pt follows commands. Pt reports sensation present in all extremities when touched with a finger, denies any numbness or tingling. PERRLA  MUSCULOSKELETAL: Spontaneous movement noted to all extremities. Hand  equal and leg strength strong +5 bilaterally.   : Urinary retention. Gomez catheter in place. 1400 ml drained after cath placement. Urine is clear, pale yellow and does not have foul odor.

## 2020-10-25 NOTE — ED TRIAGE NOTES
Pt. Is a 45 y.o. female reporting unable to urinate since 3:00 p.m today. Pt. States she is in 10/10 pain and feels the urge to urinate but cannot.

## 2020-10-25 NOTE — ED PROVIDER NOTES
Encounter Date: 10/24/2020       History     Chief Complaint   Patient presents with    Urinary Retention     Pt states she has felt like she needs to use the restroom x2 hours but cannot. Pt repots severe lower abdominal pain.     HPI   46 yo woman presenting with inability to urinate for the last few hours (decreased urine when she did urinate earlier today) and worsening lower abdominal pain. Pt reports years of inability to fully empty her bladder. Has seen urology in the past, was on oxybutynin but stopped this about one year ago. Has been diagnosed with recurrent UTI's in the past. No dysuria, urinary frequency recently. No new medications or OTC supplements. Takes elavil, . Has been taking all of these meds for the last year, no recent changes. No drinking, smoking, recreational drugs today. No fevers, chills, nausea, vomiting, weight loss or weight gain.   Review of patient's allergies indicates:   Allergen Reactions    Effexor [venlafaxine] Palpitations     Past Medical History:   Diagnosis Date    Abnormal Pap smear 1993    cryo for dysplasia    Anxiety     Depression     History of psychiatric care     prozac, lexapro, ativan    Mental disorder     Depression    Psychiatric problem     Therapy      Past Surgical History:   Procedure Laterality Date    AUGMENTATION OF BREAST      saline, 2010    Breast implants 1998 1998    Cryo 1993      DILATION AND CURETTAGE OF UTERUS      DILATION AND CURETTAGE OF UTERUS      ENDOMETRIAL ABLATION  July 11,2014    Hysteroscopy with Novasure endometrial ablation     Family History   Problem Relation Age of Onset    Diabetes Father     Stroke Father     Diabetes Mother     Hypertension Mother     Coronary artery disease Mother     Depression Mother     Seizures Mother     Ovarian cancer Maternal Aunt     Breast cancer Maternal Aunt     Breast cancer Paternal Aunt     Colon cancer Neg Hx     Suicide Neg Hx      Social History     Tobacco Use     Smoking status: Former Smoker     Packs/day: 0.50     Years: 5.00     Pack years: 2.50     Types: Cigarettes     Quit date: 10/15/1999     Years since quittin.0    Smokeless tobacco: Never Used   Substance Use Topics    Alcohol use: Yes     Alcohol/week: 2.0 standard drinks     Types: 2 Glasses of wine per week     Comment: social    Drug use: No     Review of Systems   Constitutional: Negative for chills and fever.   HENT: Negative for congestion and sore throat.    Eyes: Negative for redness and visual disturbance.   Respiratory: Negative for cough and shortness of breath.    Cardiovascular: Negative for chest pain, palpitations and leg swelling.   Gastrointestinal: Positive for abdominal pain. Negative for blood in stool, constipation, diarrhea, nausea and vomiting.   Genitourinary: Positive for difficulty urinating. Negative for dysuria, frequency, hematuria, pelvic pain and vaginal discharge.   Musculoskeletal: Negative for back pain, joint swelling, neck pain and neck stiffness.   Skin: Negative for rash and wound.   Neurological: Negative for weakness and numbness.   Psychiatric/Behavioral: Negative for confusion.       Physical Exam     Initial Vitals [10/24/20 2221]   BP Pulse Resp Temp SpO2   (!) 140/90 (!) 115 20 97.3 °F (36.3 °C) 100 %      MAP       --         Physical Exam    Nursing note and vitals reviewed.  Constitutional: She appears well-developed and well-nourished. She is not diaphoretic.   Initially appearing very uncomfortable, moaning in pain but able to transition self to bed and speak in full sentences without respiratory distress.    HENT:   Head: Normocephalic.   Eyes: Right eye exhibits no discharge. Left eye exhibits no discharge. No scleral icterus.   Neck: Neck supple. No tracheal deviation present.   Cardiovascular: Normal rate and regular rhythm. Exam reveals no gallop and no friction rub.    No murmur heard.  Pulmonary/Chest: Breath sounds normal. No stridor. No respiratory  distress. She has no wheezes. She has no rhonchi. She has no rales.   Abdominal: Soft. She exhibits no distension. There is abdominal tenderness (lower bilat, initially but quickly resolved after kowalski insertion). There is no rebound and no guarding.   Musculoskeletal: No edema.   Neurological: She is alert and oriented to person, place, and time. GCS score is 15. GCS eye subscore is 4. GCS verbal subscore is 5. GCS motor subscore is 6.   Skin: Skin is warm and dry. Capillary refill takes less than 2 seconds.         ED Course   Procedures  Labs Reviewed   CBC W/ AUTO DIFFERENTIAL - Abnormal; Notable for the following components:       Result Value    WBC 18.04 (*)     Platelets 354 (*)     MPV 9.0 (*)     Gran # (ANC) 15.3 (*)     Immature Grans (Abs) 0.08 (*)     Mono # 1.1 (*)     Gran% 85.1 (*)     Lymph% 8.0 (*)     All other components within normal limits   ISTAT PROCEDURE - Abnormal; Notable for the following components:    POC TCO2 (MEASURED) 20 (*)     All other components within normal limits   URINALYSIS, REFLEX TO URINE CULTURE    Narrative:     Specimen Source->Urine   POCT URINE PREGNANCY          Imaging Results    None          Medical Decision Making:   History:   I obtained history from: someone other than patient.       <> Summary of History:   Old Medical Records: I decided to obtain old medical records.  Initial Assessment:   Initially pt very uncomfortable and reporting severe abd pain but pain immediately improving significantly with kowalski catheter placement which was placed without issue by nursing and with instant clear, light yellow, nonbloody urine >1300 within 1 minute. Vitals and physical exam after kowalski placement within acceptable limits   Differential Diagnosis:   Urinary retention, cystocele, uti, electrolyte abnormality, medication use causing urinary retention,   Clinical Tests:   Lab Tests: Ordered  ED Management:  Kowalski placed with ~1900 clear urine total output. No UTI  on UA, but will send for cx. Pt says no buldge in rectum or vagina, has had recent pelvic in the last 6 months with no cystocele on exam. Offered to pt to keep kowalski in until she can follow up with urology (pt says she can follow up with her urologist in 2 days) but pt says she does not want to keep it in. Would rather monitor symptoms and if unable to urinate will come back to the ER. Requesting med be sent to pharmacy for help with urinary retention, therefore low dose bethanechol sent to pharmacy for symptomatic treatment until she can see urology. Cr WNL today. Discussed strict return precautions includig inability to urinate, fevers, chills, dysuria, blood in the urine, back pain. WBC elevated but pt denying any infectious sx and ua not showing infection. May be elevated due to urinary retention, stress. Pt also reports she can follow up with PCP Monday.   Other:   I have discussed this case with another health care provider.              Attending Attestation:   Physician Attestation Statement for Resident:  As the supervising MD   Physician Attestation Statement: I have personally seen and examined this patient.   I agree with the above history. -:   As the supervising MD I agree with the above PE.    As the supervising MD I agree with the above treatment, course, plan, and disposition.   -: Patient was offered use of Kowalski catheter to take home but she requested us to remove with.  She will follow-up with Urology.  Referral provided.  Return precautions.                              Clinical Impression:     ICD-10-CM ICD-9-CM   1. Urinary retention  R33.9 788.20                          ED Disposition Condition    Discharge Stable        ED Prescriptions     Medication Sig Dispense Start Date End Date Auth. Provider    bethanechol (URECHOLINE) 5 MG Tab Take 1 tablet (5 mg total) by mouth 3 (three) times daily. for 14 days 42 tablet 10/25/2020 11/8/2020 Renea Deal MD        Follow-up Information      Follow up With Specialties Details Why Contact Info    Jf Kuo MD Family Medicine Go in 2 days For followup of Emergency Room visit for urinary retention (inability to urinate) [address]  [facility] [address]  [facility] [address]  [phone]      [facility] Emergency Medicine Go to  Return to an emergency department immediately if you develop persisting or worsening symptoms or with any new symptoms such as fevers, chills, inability to eat or drink, uncontrollable pain, headaches, chagnes in vision, nausea, vomiting, stomach pain [address] [facility] [address] [phone]    Your Urologist  Go in 2 days For followup of Emergency Room visit for urinary retention                                        Renea Deal MD  Resident  10/25/20 0020       Renea Deal MD  Resident  10/25/20 0020       Rich Millan MD  10/25/20 0041

## 2020-10-27 ENCOUNTER — OFFICE VISIT (OUTPATIENT)
Dept: UROGYNECOLOGY | Facility: CLINIC | Age: 45
End: 2020-10-27
Payer: COMMERCIAL

## 2020-10-27 VITALS
WEIGHT: 143.06 LBS | DIASTOLIC BLOOD PRESSURE: 70 MMHG | HEIGHT: 67 IN | BODY MASS INDEX: 22.45 KG/M2 | SYSTOLIC BLOOD PRESSURE: 110 MMHG

## 2020-10-27 DIAGNOSIS — R33.9 INCOMPLETE BLADDER EMPTYING: Primary | ICD-10-CM

## 2020-10-27 DIAGNOSIS — D25.9 UTERINE LEIOMYOMA, UNSPECIFIED LOCATION: ICD-10-CM

## 2020-10-27 DIAGNOSIS — R39.15 URINARY URGENCY: ICD-10-CM

## 2020-10-27 DIAGNOSIS — R10.12 LEFT UPPER QUADRANT PAIN: ICD-10-CM

## 2020-10-27 DIAGNOSIS — N39.41 URGE INCONTINENCE: ICD-10-CM

## 2020-10-27 DIAGNOSIS — R10.32 LLQ PAIN: ICD-10-CM

## 2020-10-27 PROCEDURE — 3008F PR BODY MASS INDEX (BMI) DOCUMENTED: ICD-10-PCS | Mod: CPTII,S$GLB,, | Performed by: NURSE PRACTITIONER

## 2020-10-27 PROCEDURE — 99999 PR PBB SHADOW E&M-EST. PATIENT-LVL IV: CPT | Mod: PBBFAC,,, | Performed by: NURSE PRACTITIONER

## 2020-10-27 PROCEDURE — 99213 OFFICE O/P EST LOW 20 MIN: CPT | Mod: 25,S$GLB,, | Performed by: NURSE PRACTITIONER

## 2020-10-27 PROCEDURE — 99999 PR PBB SHADOW E&M-EST. PATIENT-LVL IV: ICD-10-PCS | Mod: PBBFAC,,, | Performed by: NURSE PRACTITIONER

## 2020-10-27 PROCEDURE — 51701 PR INSERTION OF NON-INDWELLING BLADDER CATHETERIZATION FOR RESIDUAL UR: ICD-10-PCS | Mod: S$GLB,,, | Performed by: NURSE PRACTITIONER

## 2020-10-27 PROCEDURE — 3008F BODY MASS INDEX DOCD: CPT | Mod: CPTII,S$GLB,, | Performed by: NURSE PRACTITIONER

## 2020-10-27 PROCEDURE — 99213 PR OFFICE/OUTPT VISIT, EST, LEVL III, 20-29 MIN: ICD-10-PCS | Mod: 25,S$GLB,, | Performed by: NURSE PRACTITIONER

## 2020-10-27 PROCEDURE — 51701 INSERT BLADDER CATHETER: CPT | Mod: S$GLB,,, | Performed by: NURSE PRACTITIONER

## 2020-10-27 NOTE — PROGRESS NOTES
Urogyn follow up  10/27/2020  .  [facility] [place]  [address]  [facility] [address]    Lynne Velasco  [id number]  1975      Lynne Velasco is a 45 y.o. here for a urogyn follow up for incomplete bladder emptying.  1000+ urinary retention suddenly 2 days ago.  Has been having intermittent constipation. Occasionally feels like she is not emptying her bladder    Last HPI from 06/10/2019     1. Urinary urgency vs. Frequent uti  --denies SUZI  --rare UUI --wearing liner pad occasionally  --voiding every hour  --nocturia 1-2 times/ night-- limits fluids at least 2 hours prior to bedtime      2. Constipation  --resolved  --eats high fiber diet     3)recurrent uti  --asymptomatic at this  2/28/2019 e. coli  11/26/2018 e. coli  11/20/2017 e. coli         Past Medical History:   Diagnosis Date    Abnormal Pap smear 1993    cryo for dysplasia    Anxiety     Depression     History of psychiatric care     prozac, lexapro, ativan    Mental disorder     Depression    Psychiatric problem     Therapy        Past Surgical History:   Procedure Laterality Date    AUGMENTATION OF BREAST      saline, 2010    Breast implants 1998 1998    Cryo 1993      DILATION AND CURETTAGE OF UTERUS      DILATION AND CURETTAGE OF UTERUS      ENDOMETRIAL ABLATION  July 11,2014    Hysteroscopy with Novasure endometrial ablation       Family History   Problem Relation Age of Onset    Diabetes Father     Stroke Father     Diabetes Mother     Hypertension Mother     Coronary artery disease Mother     Depression Mother     Seizures Mother     Ovarian cancer Maternal Aunt     Breast cancer Maternal Aunt     Breast cancer Paternal Aunt     Colon cancer Neg Hx     Suicide Neg Hx        Social History     Socioeconomic History    Marital status:      Spouse name: Not on file    Number of children: Not on file    Years of education: Not on file    Highest education level: Not on  file   Occupational History     Employer: [employer]   Social Needs    Financial resource strain: Not on file    Food insecurity     Worry: Not on file     Inability: Not on file    Transportation needs     Medical: Not on file     Non-medical: Not on file   Tobacco Use    Smoking status: Former Smoker     Packs/day: 0.50     Years: 5.00     Pack years: 2.50     Types: Cigarettes     Quit date: 10/15/1999     Years since quittin.0    Smokeless tobacco: Never Used   Substance and Sexual Activity    Alcohol use: Yes     Alcohol/week: 2.0 standard drinks     Types: 2 Glasses of wine per week     Comment: social    Drug use: No    Sexual activity: Yes     Partners: Male     Birth control/protection: Surgical, None     Comment: Spouse had Vasectomy,  since    Lifestyle    Physical activity     Days per week: Not on file     Minutes per session: Not on file    Stress: Not on file   Relationships    Social connections     Talks on phone: Not on file     Gets together: Not on file     Attends Hoahaoism service: Not on file     Active member of club or organization: Not on file     Attends meetings of clubs or organizations: Not on file     Relationship status: Not on file   Other Topics Concern    Patient feels they ought to cut down on drinking/drug use Not Asked    Patient annoyed by others criticizing their drinking/drug use Not Asked    Patient has felt bad or guilty about drinking/drug use Not Asked    Patient has had a drink/used drugs as an eye opener in the AM Not Asked   Social History Narrative    - 14 years    [facility]    Works for a law firm- - associates degree    3 children     close with mom       Current Outpatient Medications   Medication Sig Dispense Refill    amitriptyline (ELAVIL) 25 MG tablet Take 1 tablet (25 mg total) by mouth nightly as needed for Insomnia (Headache and insomnia). 90 tablet 3    bethanechol (URECHOLINE) 5 MG Tab Take 1 tablet (5 mg total) by  "mouth 3 (three) times daily. for 14 days 42 tablet 0    diclofenac (VOLTAREN) 50 MG EC tablet Take 1 tablet (50 mg total) by mouth 2 (two) times daily as needed (headache). 30 tablet 5    escitalopram oxalate (LEXAPRO) 10 MG tablet escitalopram 10 mg tablet   TK 2 TS PO ONCE DAILY      finasteride (PROSCAR) 5 mg tablet TK 1 T PO QD      LORazepam (ATIVAN) 0.5 MG tablet lorazepam 0.5 mg tablet      melatonin 10 mg Cap Take by mouth.      spironolactone (ALDACTONE) 50 MG tablet TK 1 T PO QD FOR 2 WEEKS THEN 2 TS QD FOR 2 WEEKS THEN 3 TS QD THEREAFTER AS TOLERATED  5    sumatriptan (IMITREX) 100 MG tablet Take 1 tablet (100 mg total) by mouth every 2 (two) hours as needed for Migraine. No more than twice in a 24 hour period 10 tablet 11    zolpidem (AMBIEN) 5 MG Tab zolpidem 5 mg tablet       No current facility-administered medications for this visit.        Review of patient's allergies indicates:   Allergen Reactions    Effexor [venlafaxine] Palpitations       Well woman:  Pap test: 11/26/2018 normal neg hpv.  History of abnormal paps: Yes - cryo in the past.  History of STIs:  No  Mammogram: Date of last: 01/2020 normal elevated TC score     ROS:  As per HPI.      Exam  /70 (BP Location: Right arm, Patient Position: Sitting, BP Method: Medium (Manual))   Ht 5' 7" (1.702 m)   Wt 64.9 kg (143 lb 1.3 oz)   BMI 22.41 kg/m²   General: alert and oriented, no acute distress  Respiratory: normal respiratory effort  Abd: soft, non-tender, non-distended    Pelvic  Ext. Genitalia: normal external genitalia. Normal bartholin's and skeens glands  Vagina: + (mild)  atrophy. Normal vaginal mucosa without lesions. No discharge noted.   Non-tender bladder base without palpable mass.  Cervix: no lesions  Uterus:  enlarged to 12 week's size; multiple fibriods palpable--bulky, anterior fibroid palpable to UVJ  (most of anterior vaginal wall) +TTP in LLQ  Urethra: no masses or tenderness  Urethral meatus: no lesions, " caruncle or prolapse. Resistance inserting catheter    POP-Q:  Aa -1; Ba -1; C -6; Ap 0; Bp 0; D -8.  Genital hiatus 2, perineal body 2 total vaginal length 11.    PVR 50 mL  Dr. Mcdonnell present during exam    Impression  1. Incomplete bladder emptying  MRI Abdomen Pelvis Without Contrast (XPD)    Simple urodynamics w/ cysto   2. Uterine leiomyoma, unspecified location  MRI Abdomen Pelvis Without Contrast (XPD)   3. Left upper quadrant pain  MRI Abdomen Pelvis Without Contrast (XPD)   4. LLQ pain  MRI Abdomen Pelvis Without Contrast (XPD)   5. Urinary urgency     6. Urge incontinence  Simple urodynamics w/ cysto     We reviewed the above issues and discussed options for short-term versus long-term management of her problems.   Plan:   1. Midline cystocele stage 2, rectocele stage 2, fibroid uterus  --surgical option-- robotic hysterectomy, anterior/ posterior repair, possible midurethral sling  --MRI of abdomen/ pelvic to to evaluation uterine fibroids  --suds/ cystoscopy  --would need to get medical clearance from your primary care physician  --follow up with neurologist    2. RTC for suds    30 minutes were spent in face to face time with this patient  90 % of this time was spent in counseling and/or coordination of care    NADER Figueroa-BC [facility]  Division of Female Pelvic Medicine and Reconstructive Surgery  Department of Obstetrics & Gynecology

## 2020-10-27 NOTE — PATIENT INSTRUCTIONS
1. Midline cystocele stage 2, rectocele stage 2, fibroid uterus  --surgical option-- robotic hysterectomy, anterior/ posterior repair, possible midurethral sling  --MRI of abdomen/ pelvic to to evaluation uterine fibroids  --suds/ cystoscopy  --would need to get medical clearance from your primary care physician  --follow up with neurologist    2. RTC for suds

## 2020-11-02 ENCOUNTER — OFFICE VISIT (OUTPATIENT)
Dept: NEUROLOGY | Facility: CLINIC | Age: 45
End: 2020-11-02
Payer: COMMERCIAL

## 2020-11-02 DIAGNOSIS — G44.221 CHRONIC TENSION-TYPE HEADACHE, INTRACTABLE: Primary | ICD-10-CM

## 2020-11-02 DIAGNOSIS — M62.9 MUSCULOSKELETAL DISORDER INVOLVING UPPER TRAPEZIUS MUSCLE: ICD-10-CM

## 2020-11-02 DIAGNOSIS — Z51.81 MEDICATION MONITORING ENCOUNTER: ICD-10-CM

## 2020-11-02 DIAGNOSIS — M53.82 IMPAIRED RANGE OF MOTION OF CERVICAL SPINE: ICD-10-CM

## 2020-11-02 PROCEDURE — 99213 OFFICE O/P EST LOW 20 MIN: CPT | Mod: 95,,, | Performed by: PSYCHIATRY & NEUROLOGY

## 2020-11-02 PROCEDURE — 99213 PR OFFICE/OUTPT VISIT, EST, LEVL III, 20-29 MIN: ICD-10-PCS | Mod: 95,,, | Performed by: PSYCHIATRY & NEUROLOGY

## 2020-11-02 NOTE — PROGRESS NOTES
[facility] - NEUROLOGY [address] [address] [facility] [place]    Date: November 2, 2020   Patient Name: Lynne Velasco   MRN: [id number]   PCP: Jf Kuo  Referring Provider: No ref. provider found    The patient location is: home  The chief complaint leading to consultation is: headache  Visit type: audiovisual  Face to Face time with patient: 25 minutes of total time spent on the encounter, which includes face to face time and non-face to face time preparing to see the patient (eg, review of tests), Obtaining and/or reviewing separately obtained history, Documenting clinical information in the electronic or other health record, Independently interpreting results (not separately reported) and communicating results to the patient/family/caregiver, or Care coordination (not separately reported).   Each patient to whom he or she provides medical services by telemedicine is:  (1) informed of the relationship between the physician and patient and the respective role of any other health care provider with respect to management of the patient; and (2) notified that he or she may decline to receive medical services by telemedicine and may withdraw from such care at any time.      Assessment:      This is Lynne Velasco, 45 y.o. female with mixed migraine and tension headache with exacerbation by sleep disturbance.     Plan:      -  Encouraged follow up with PT to discuss modalities such as dry needling.  -  Elavil prn, possible insomnia benefit, advised to discuss hormone therapy with gyn  -  Mg supplement  -  Imitrex, voltaren prn    Follow up 6-12 months       I discussed side effects of the medications. I asked the patient to stop the medication if she notices serious adverse effects as we discussed and to seek immediate medical attention at an ER.     Zac Almanza MD  [facility]   Department of Neurology    Subjective:     -  Notes mixed effect of  PT  -  Good effect of elavil, imitrex, voltaren; overall headache burden reduced       HPI 9/2020:   Ms. Lynne Velasco is a 45 y.o. female who presents with a chief complaint of headaches    Patient has long history of mixed migraine and tension type headaches with frequent photo/phonophobia and vertigo.  These have been worse over the past several months with near daily headaches in July although some reduction in August.  She has been prescribed imitrex which is helpful but requires frequently and some difficulty due to sedation.  She has significant difficulty maintaining but not initiating sleep with limited effect ambien    PAST MEDICAL HISTORY:  Past Medical History:   Diagnosis Date    Abnormal Pap smear 1993    cryo for dysplasia    Anxiety     Depression     History of psychiatric care     prozac, lexapro, ativan    Mental disorder     Depression    Psychiatric problem     Therapy        PAST SURGICAL HISTORY:  Past Surgical History:   Procedure Laterality Date    AUGMENTATION OF BREAST      saline, 2010    Breast implants 1998 1998    Cryo 1993      DILATION AND CURETTAGE OF UTERUS      DILATION AND CURETTAGE OF UTERUS      ENDOMETRIAL ABLATION  July 11,2014    Hysteroscopy with Novasure endometrial ablation       CURRENT MEDS:  Current Outpatient Medications   Medication Sig Dispense Refill    amitriptyline (ELAVIL) 25 MG tablet Take 1 tablet (25 mg total) by mouth nightly as needed for Insomnia (Headache and insomnia). 90 tablet 3    bethanechol (URECHOLINE) 5 MG Tab Take 1 tablet (5 mg total) by mouth 3 (three) times daily. for 14 days 42 tablet 0    diclofenac (VOLTAREN) 50 MG EC tablet Take 1 tablet (50 mg total) by mouth 2 (two) times daily as needed (headache). 30 tablet 5    escitalopram oxalate (LEXAPRO) 10 MG tablet escitalopram 10 mg tablet   TK 2 TS PO ONCE DAILY      finasteride (PROSCAR) 5 mg tablet TK 1 T PO QD      LORazepam (ATIVAN) 0.5 MG tablet lorazepam  0.5 mg tablet      melatonin 10 mg Cap Take by mouth.      spironolactone (ALDACTONE) 50 MG tablet TK 1 T PO QD FOR 2 WEEKS THEN 2 TS QD FOR 2 WEEKS THEN 3 TS QD THEREAFTER AS TOLERATED  5    sumatriptan (IMITREX) 100 MG tablet Take 1 tablet (100 mg total) by mouth every 2 (two) hours as needed for Migraine. No more than twice in a 24 hour period 10 tablet 11    zolpidem (AMBIEN) 5 MG Tab zolpidem 5 mg tablet       No current facility-administered medications for this visit.        ALLERGIES:  Review of patient's allergies indicates:   Allergen Reactions    Effexor [venlafaxine] Palpitations       FAMILY HISTORY:  Family History   Problem Relation Age of Onset    Diabetes Father     Stroke Father     Diabetes Mother     Hypertension Mother     Coronary artery disease Mother     Depression Mother     Seizures Mother     Ovarian cancer Maternal Aunt     Breast cancer Maternal Aunt     Breast cancer Paternal Aunt     Colon cancer Neg Hx     Suicide Neg Hx        SOCIAL HISTORY:  Social History     Tobacco Use    Smoking status: Former Smoker     Packs/day: 0.50     Years: 5.00     Pack years: 2.50     Types: Cigarettes     Quit date: 10/15/1999     Years since quittin.0    Smokeless tobacco: Never Used   Substance Use Topics    Alcohol use: Yes     Alcohol/week: 2.0 standard drinks     Types: 2 Glasses of wine per week     Comment: social    Drug use: No       Review of Systems:  12 review of systems is negative except for the symptoms mentioned in HPI.        Objective:     There were no vitals filed for this visit.    General: NAD, well nourished   Eyes: no tearing, discharge, no erythema   ENT: moist mucous membranes of the oral cavity, nares patent    Neck: limitation in ROM on turn to the right, some elevation of right shoulder  Cardiovascular: Warm and well perfused, pulses equal and symmetrical  Lungs: Normal work of breathing, normal chest wall excursions  Skin: No rash, lesions, or  breakdown on exposed skin  Psychiatry: Mood and affect are appropriate   Abdomen: soft, non tender, non distended  Extremeties: No cyanosis, clubbing or edema.    Neurological   MENTAL STATUS: Alert and oriented to person, place, and time. Attention and concentration within normal limits. Speech without dysarthria, able to name and repeat without difficulty. Recent and remote memory within normal limits   CRANIAL NERVES: Visual fields intact. PERRL. EOMI. Facial sensation intact. Face symmetrical. Hearing grossly intact. Full shoulder shrug bilaterally. Tongue protrudes midline   SENSORY: NA  MOTOR: Normal bulk and tone. No pronator drift.    REFLEXES: NA  CEREBELLAR/COORDINATION/GAIT: Gait steady with normal arm swing and stride length.

## 2020-11-03 ENCOUNTER — TELEPHONE (OUTPATIENT)
Dept: UROGYNECOLOGY | Facility: CLINIC | Age: 45
End: 2020-11-03

## 2020-11-03 NOTE — TELEPHONE ENCOUNTER
Spoke with [employer] insurance company case #[id number] regarding pt's pending status for MRI pelvis and abdominal. Case status approved per MIRZA Varma.

## 2020-11-03 NOTE — TELEPHONE ENCOUNTER
----- Message from Celeste Buchanan sent at 11/3/2020 10:22 AM CST -----  Regarding: Results  Pt is calling to speak with Staff regarding her test results.  She is requesting the office contact her insurance company for an impending MRI.  She says she was told they need additional information from Dr. Varma to approve it.    She can be reached at [phone].    Thank you.

## 2020-11-04 ENCOUNTER — PROCEDURE VISIT (OUTPATIENT)
Dept: UROGYNECOLOGY | Facility: CLINIC | Age: 45
End: 2020-11-04
Payer: COMMERCIAL

## 2020-11-04 ENCOUNTER — PATIENT MESSAGE (OUTPATIENT)
Dept: UROGYNECOLOGY | Facility: CLINIC | Age: 45
End: 2020-11-04

## 2020-11-04 VITALS
BODY MASS INDEX: 22.84 KG/M2 | WEIGHT: 145.5 LBS | DIASTOLIC BLOOD PRESSURE: 55 MMHG | HEART RATE: 70 BPM | HEIGHT: 67 IN | SYSTOLIC BLOOD PRESSURE: 117 MMHG

## 2020-11-04 DIAGNOSIS — N39.41 URGE INCONTINENCE: ICD-10-CM

## 2020-11-04 DIAGNOSIS — R33.9 INCOMPLETE BLADDER EMPTYING: ICD-10-CM

## 2020-11-04 LAB
BILIRUB SERPL-MCNC: NORMAL MG/DL
BLOOD URINE, POC: NORMAL
CLARITY, POC UA: CLEAR
COLOR, POC UA: YELLOW
GLUCOSE UR QL STRIP: NORMAL
KETONES UR QL STRIP: NORMAL
LEUKOCYTE ESTERASE URINE, POC: NORMAL
NITRITE, POC UA: NORMAL
PH, POC UA: 6
PROTEIN, POC: NORMAL
SPECIFIC GRAVITY, POC UA: 1.01
UROBILINOGEN, POC UA: NORMAL

## 2020-11-04 PROCEDURE — 81002 URINALYSIS NONAUTO W/O SCOPE: CPT | Mod: S$GLB,,, | Performed by: OBSTETRICS & GYNECOLOGY

## 2020-11-04 PROCEDURE — 51797 PR VOIDING PRESS STUDY INTRA-ABDOMINAL VOID: ICD-10-PCS | Mod: S$GLB,,, | Performed by: OBSTETRICS & GYNECOLOGY

## 2020-11-04 PROCEDURE — 51741 PR UROFLOWMETRY, COMPLEX: ICD-10-PCS | Mod: 51,S$GLB,, | Performed by: OBSTETRICS & GYNECOLOGY

## 2020-11-04 PROCEDURE — 81002 POCT URINE DIPSTICK WITHOUT MICROSCOPE: ICD-10-PCS | Mod: S$GLB,,, | Performed by: OBSTETRICS & GYNECOLOGY

## 2020-11-04 PROCEDURE — 51784 PR ANAL/URINARY MUSCLE STUDY: ICD-10-PCS | Mod: 51,S$GLB,, | Performed by: OBSTETRICS & GYNECOLOGY

## 2020-11-04 PROCEDURE — 51728 CYSTOMETROGRAM W/VP: CPT | Mod: S$GLB,,, | Performed by: OBSTETRICS & GYNECOLOGY

## 2020-11-04 PROCEDURE — 52000 PR CYSTOURETHROSCOPY: ICD-10-PCS | Mod: 59,S$GLB,, | Performed by: OBSTETRICS & GYNECOLOGY

## 2020-11-04 PROCEDURE — 51797 INTRAABDOMINAL PRESSURE TEST: CPT | Mod: S$GLB,,, | Performed by: OBSTETRICS & GYNECOLOGY

## 2020-11-04 PROCEDURE — 51741 ELECTRO-UROFLOWMETRY FIRST: CPT | Mod: 51,S$GLB,, | Performed by: OBSTETRICS & GYNECOLOGY

## 2020-11-04 PROCEDURE — 51728 PR COMPLEX CYSTOMETROGRAM VOIDING PRESSURE STUDIES: ICD-10-PCS | Mod: S$GLB,,, | Performed by: OBSTETRICS & GYNECOLOGY

## 2020-11-04 PROCEDURE — 52000 CYSTOURETHROSCOPY: CPT | Mod: 59,S$GLB,, | Performed by: OBSTETRICS & GYNECOLOGY

## 2020-11-04 PROCEDURE — 51784 ANAL/URINARY MUSCLE STUDY: CPT | Mod: 51,S$GLB,, | Performed by: OBSTETRICS & GYNECOLOGY

## 2020-11-04 RX ORDER — CIPROFLOXACIN 500 MG/1
500 TABLET ORAL
Status: COMPLETED | OUTPATIENT
Start: 2020-11-04 | End: 2020-11-04

## 2020-11-04 RX ORDER — LIDOCAINE HYDROCHLORIDE 20 MG/ML
JELLY TOPICAL ONCE
Status: COMPLETED | OUTPATIENT
Start: 2020-11-04 | End: 2020-11-04

## 2020-11-04 RX ADMIN — CIPROFLOXACIN 500 MG: 500 TABLET ORAL at 03:11

## 2020-11-04 RX ADMIN — LIDOCAINE HYDROCHLORIDE 5 ML: 20 JELLY TOPICAL at 03:11

## 2020-11-04 NOTE — PROGRESS NOTES
TITLE OF OPERATION:  1. Complex cystometry.  2. Complex uroflowmetry.  3. Electromyography with surface electrodes.  4. Pressure voiding flow study.  5. Abdominal pressure measurement.  6. Leak point pressure measurement.    INDICATIONS:  Lnyne Velasco is a 45 y.o. here for a urogyn follow up for incomplete bladder emptying.  1000+ urinary retention suddenly 2 days ago.  Has been having intermittent constipation. Occasionally feels like she is not emptying her bladder     Last HPI from 06/10/2019     1. Urinary urgency vs. Frequent uti  --denies SUZI  --rare UUI --wearing liner pad occasionally  --voiding every hour  --nocturia 1-2 times/ night-- limits fluids at least 2 hours prior to bedtime      2. Constipation  --resolved  --eats high fiber diet     3)recurrent uti  --asymptomatic at this  2/28/2019 e. coli  11/26/2018 e. coli  11/20/2017 e. Coli    10/2020 exam:  Pelvic  Ext. Genitalia: normal external genitalia. Normal bartholin's and skeens glands  Vagina: + (mild)  atrophy. Normal vaginal mucosa without lesions. No discharge noted.   Non-tender bladder base without palpable mass.  Cervix: no lesions  Uterus:  enlarged to 12 week's size; multiple fibriods palpable--bulky, anterior fibroid palpable to UVJ  (most of anterior vaginal wall) +TTP in LLQ  Urethra: no masses or tenderness  Urethral meatus: no lesions, caruncle or prolapse. Resistance inserting catheter  --POP-Q:  Aa -1; Ba -1; C -6; Ap 0; Bp 0; D -8.  Genital hiatus 2, perineal body 2 total vaginal length 11.  --PVR 50 mL  --4/2019 pelvic US:  FINDINGS:  The uterus measures 9.8 x 6.7 x 7.9.  The endometrial stripe is not visualized.  Patient has a history of ablation.  There is a right posterior uterus near the fundus measuring 6 x 5 x 6 cm.  It previously measured 5.4 x 5.8 x 4.2 cm.  Nabothian cysts are seen at the lower uterine segment.  The right ovary measures 2 x 1.3 x 2.2 cm. Vascular flow is detected in the right ovary.  The left  ovary measures 3.7 x 1.3 x 2.0 cm. Vascular flow is detected in the left ovary.  There is no free fluid in the pelvis.  No free fluid is seen in the cul-de-sac.  IMPRESSION: Decrease in size of a mass or fibroid at the right posterior uterus near the fundus.    PREOPERATIVE DIAGNOSIS:  1)  Incomplete bladder emptying  2)  Uterine fibroids  3)  LLQ pain  4)  Urinary urge incontinence  5)  History UTIs  6)  Constipation  7)  Cystocele  8)  Rectocele    POSTOPERATIVE DIAGNOSIS:  1)  Incomplete bladder emptying  2)  Uterine fibroids  3)  LLQ pain  4)  Urinary urge incontinence  5)  History UTIs  6)  Constipation  7)  Cystocele  8)  Rectocele  9)  Concern for voiding dysfunction    ANESTHESIA:  None.    SPECIMEN (BACTERIOLOGICAL, PATHOLOGICAL OR OTHER):  None.    PROSTHETIC DEVICE/IMPLANT:  None.    SURGEONS NARRATIVE:  A time out was performed in which the patient identity and procedure were confirmed.  Urodynamic evaluation was performed using a computerized system ([employer], [employer]  Uroflowmetry was performed on the patient in the sitting position without catheters in place.  Subsequent urodynamic testing was performed with the patient in the lithotomy position at 45 degrees. Air charged catheters were used with sterile water as the infusion medium. Vesical and abdominal (rectal) pressures were measured, and detrusor pressure was calculated. EMG activity was recorded with surface electrodes. During filling, room temperature sterile water was infused at a rate of 30 cubic centimeters per minute. The patient was asked cough after instillation of each 100cc volume. Two Valsalva leak point pressures and two cough leak point pressures were performed with the catheters in place at 300 cubic centimeters and again at maximum capacity. Valsalva leak point pressure was defined as the difference between vesical pressure at which leakage was noted (visualized at the external urethral meatus) and the baseline vesical  pressure. Following urodynamic testing, a pressure flow study was performed with the patient in the sitting position. Vesical and abdominal pressures were monitored and detrusor pressures were calculated. After the pressure flow study, the catheters were then removed. The patient tolerated the procedure well.     Urine dipstick: neg.    1.  VOIDING PHASE:      a.  Uroflowmetry:   Prolapse reduction: No   Voided volume:  96 mL    PVR:   25 mL    The overall configuration of this uroflow study was with insufficient volume for interpretation.      b.  Pressure flow:   Prolapse reduction: No   Voided volume:   532 mL   Voiding time:   >5 min    Peak flow:  12 mL/s    Avg flow:  2 mL/s   Max det pressure:  52  cm H20   Det pressure at max flow: negative reading as pves catheter was dislodged during study.    Void initiated by detrusor contraction.     Urethral relaxation (EMG):  absent.     PVR (calculated):  68 mL    The overall configuration of this pressure flow study was prolonged with concern for voiding dysfunction.      2.  FILLING PHASE:   1st desire: 31 mL   Normal desire:  150 mL   Strong desire:  410 mL   Urgency:  600 mL   Compliance (calculated)  approx 100 mL/cm H20   EMG activity during filling:  stable   Detrusor contractions observed: No.     3.  URETHRAL FUNCTION/STORAGE PHASE:    a.  WITH prolapse reduction:   CLPP (150 mL): Negative  at  94 cm H20   VLPP (150 mL): Negative  at  84 cm H20    CLPP (300 mL): Negative  at  98 cm H20   VLPP (300 mL): Negative  at  98 cm H20    CLPP (450 mL): Negative  at  97 cm H20   VLPP (450 mL): Negative  at  76 cm H20    CLPP (MAX ):    Negative  at  75 cm H20   VLPP (MAX):     Negative  at  102 cm H20   NEG CLPP and VLPP at max cap once pves catheter removed    These findings are consistent with Negative urodynamic stress incontinence.    Assessment:  UF with insufficient volume for interpretation.  PF prolonged with concern for voiding  dysfunction.  Compliance normal.  Max capacity 600 mL.  DO (--).  SUZI (--).      Plan:  1. Midline cystocele stage 2, rectocele stage 2, fibroid uterus  --surgical option-- robotic hysterectomy, anterior/ posterior repair, possible midurethral sling   --no sling indicated with UDS; is able to empty bladder with DC on PF today  --MRI of pelvis to to evaluation uterine fibroids--pending (abd MRI not covered)  --suds/ cystoscopy   --no sling indicated with UDS; is able to empty bladder with DC on PF today  --would need to get medical clearance from your primary care physician  --follow up with neurologist  --------------------------------------------    Title of Operation:   Cystourethroscopy.     INDICATIONS:  As above    PREOPERATIVE DIAGNOSIS  As above    POSTOPERATIVE DIAGNOSIS:   As above    Anesthesia:   2% Xylocaine gel.    Specimen (Bacteriological, Pathological or other):   None.     Prosthetic Device/Implant:   None.     Surgeons Narrative:     After informed consent was obtained, the patient was placed in the lithotomy position. The urethral meatus was prepped with Betadine and 10 cubic centimeters of 2% Xylocaine gel were introduced into the urethra. A flexible cystourethroscope was introduced into the bladder. The bladder was distended with approximately 300 cubic centimeters of sterile water. A systematic survey was performed in which the bladder was surveyed using multiple sequential passes in a clockwise fashion from the bladder dome to the bladder base to the urethrovesical junction. The trigone and ureteral orifices were observed. The scope was then flipped back on itself, and the urethrovesical junction was viewed. A vaginal examining finger was then placed with pressure suburethrally at the urethrovesical junction as the telescope was withdrawn in order to perform positive pressure urethroscopy.  Standard maneuvers of cough, squeeze and Valsalva were performed. The telescope was then completely  withdrawn.     Findings: Urethroscopy:  Normal.  Cystoscopy:  Normal bladder mucosa--able to see impression of large anterior fibroid just beneath bladder mucosa at bladder base & cephalad to trigone, bilateral ureteral flow was noted.     Assessment: Essentially normal cystourethroscopy with evidence of large, anterior fibroid deep to bladder base.     Plan: The patient will follow up with Dr. Mcdonnell as scheduled.  See urodynamics note from 11/4/2020 for further plan details.

## 2020-11-05 ENCOUNTER — TELEPHONE (OUTPATIENT)
Dept: UROGYNECOLOGY | Facility: CLINIC | Age: 45
End: 2020-11-05

## 2020-11-05 DIAGNOSIS — D25.9 UTERINE LEIOMYOMA, UNSPECIFIED LOCATION: ICD-10-CM

## 2020-11-05 DIAGNOSIS — R33.9 INCOMPLETE BLADDER EMPTYING: Primary | ICD-10-CM

## 2020-11-09 ENCOUNTER — HOSPITAL ENCOUNTER (OUTPATIENT)
Dept: RADIOLOGY | Facility: HOSPITAL | Age: 45
Discharge: HOME | End: 2020-11-09
Attending: NURSE PRACTITIONER
Payer: COMMERCIAL

## 2020-11-09 DIAGNOSIS — R10.32 LLQ PAIN: ICD-10-CM

## 2020-11-09 DIAGNOSIS — R33.9 INCOMPLETE BLADDER EMPTYING: ICD-10-CM

## 2020-11-09 DIAGNOSIS — R10.12 LEFT UPPER QUADRANT PAIN: ICD-10-CM

## 2020-11-09 DIAGNOSIS — D25.9 UTERINE LEIOMYOMA, UNSPECIFIED LOCATION: ICD-10-CM

## 2020-11-09 PROCEDURE — 72197 MRI PELVIS W/O & W/DYE: CPT | Mod: TC

## 2020-11-09 PROCEDURE — 72197 MRI PELVIS W/O & W/DYE: CPT | Mod: 26,,, | Performed by: RADIOLOGY

## 2020-11-09 PROCEDURE — 72197 MRI FEMALE PELVIS W W/O CONTRAST: ICD-10-PCS | Mod: 26,,, | Performed by: RADIOLOGY

## 2020-11-09 PROCEDURE — 25500020 PHARM REV CODE 255: Performed by: NURSE PRACTITIONER

## 2020-11-09 PROCEDURE — A9585 GADOBUTROL INJECTION: HCPCS | Performed by: NURSE PRACTITIONER

## 2020-11-09 RX ORDER — GADOBUTROL 604.72 MG/ML
10 INJECTION INTRAVENOUS
Status: COMPLETED | OUTPATIENT
Start: 2020-11-09 | End: 2020-11-09

## 2020-11-09 RX ADMIN — GADOBUTROL 10 ML: 604.72 INJECTION INTRAVENOUS at 07:11

## 2020-11-12 ENCOUNTER — PATIENT MESSAGE (OUTPATIENT)
Dept: UROGYNECOLOGY | Facility: CLINIC | Age: 45
End: 2020-11-12

## 2020-11-12 NOTE — TELEPHONE ENCOUNTER
Attempted to reach patient regarding MRI results. MO message sent to call office. Es Varma, MAHESHP-BC

## 2020-11-13 ENCOUNTER — PATIENT MESSAGE (OUTPATIENT)
Dept: UROGYNECOLOGY | Facility: CLINIC | Age: 45
End: 2020-11-13

## 2020-11-13 NOTE — TELEPHONE ENCOUNTER
Reviewed MRI with patient.  Discuss surgical dates.  She will call back to schedule.  Es Varma, NADER-BC

## 2020-11-18 ENCOUNTER — PATIENT MESSAGE (OUTPATIENT)
Dept: UROGYNECOLOGY | Facility: CLINIC | Age: 45
End: 2020-11-18

## 2020-11-18 ENCOUNTER — PATIENT MESSAGE (OUTPATIENT)
Dept: INTERNAL MEDICINE | Facility: CLINIC | Age: 45
End: 2020-11-18

## 2020-11-18 DIAGNOSIS — N81.11 MIDLINE CYSTOCELE: ICD-10-CM

## 2020-11-18 DIAGNOSIS — N81.6 RECTOCELE: ICD-10-CM

## 2020-11-18 DIAGNOSIS — D25.9 UTERINE LEIOMYOMA, UNSPECIFIED LOCATION: Primary | ICD-10-CM

## 2020-11-18 DIAGNOSIS — Z01.818 PREOP TESTING: ICD-10-CM

## 2020-11-19 DIAGNOSIS — Z01.818 PREOP TESTING: Primary | ICD-10-CM

## 2020-11-30 ENCOUNTER — OFFICE VISIT (OUTPATIENT)
Dept: INTERNAL MEDICINE | Facility: CLINIC | Age: 45
End: 2020-11-30
Attending: FAMILY MEDICINE
Payer: COMMERCIAL

## 2020-11-30 DIAGNOSIS — G44.52 NEW DAILY PERSISTENT HEADACHE: Primary | ICD-10-CM

## 2020-11-30 DIAGNOSIS — F41.9 ANXIETY: ICD-10-CM

## 2020-11-30 PROCEDURE — 99214 PR OFFICE/OUTPT VISIT, EST, LEVL IV, 30-39 MIN: ICD-10-PCS | Mod: 95,,, | Performed by: FAMILY MEDICINE

## 2020-11-30 PROCEDURE — 99214 OFFICE O/P EST MOD 30 MIN: CPT | Mod: 95,,, | Performed by: FAMILY MEDICINE

## 2020-11-30 NOTE — PROGRESS NOTES
The patient location is:  [place]  The chief complaint leading to consultation is:  Preop clearance    Visit type: audiovisual    Face to Face time with patient:  12 min  Twenty minutes of total time spent on the encounter, which includes face to face time and non-face to face time preparing to see the patient (eg, review of tests), Obtaining and/or reviewing separately obtained history, Documenting clinical information in the electronic or other health record, Independently interpreting results (not separately reported) and communicating results to the patient/family/caregiver, or Care coordination (not separately reported).         Each patient to whom he or she provides medical services by telemedicine is:  (1) informed of the relationship between the physician and patient and the respective role of any other health care provider with respect to management of the patient; and (2) notified that he or she may decline to receive medical services by telemedicine and may withdraw from such care at any time.    Notes: This patient was seen during the COVID-19 pandemic.    CHIEF COMPLAINT:  Preop clearance    HISTORY OF PRESENT ILLNESS: The patient is a very pleasant 45-year-old white female.  She has had endometrial ablation in the past.  No problems with DVT or pulmonary embolism.  No problems with anesthesia.  No family problems with anesthesia.    She does have a history of a significant concussion years ago.    REVIEW OF SYSTEMS:  GENERAL: No fever, chills, fatigability or weight loss.  SKIN: No rashes, itching or changes in color or texture of skin.  HEAD: The patient does have headaches but no recent head trauma.  EYES: Visual acuity fine. No photophobia, ocular pain or diplopia.  EARS: Denies ear pain, discharge or vertigo.  NOSE: No loss of smell, no epistaxis or postnasal drip.  MOUTH & THROAT: No hoarseness or change in voice. No excessive gum bleeding.  NODES: Denies swollen glands.  CHEST: Denies ROJO,  cyanosis, wheezing, cough and sputum production.  CARDIOVASCULAR: Denies chest pain, PND, orthopnea or reduced exercise tolerance.  ABDOMEN: Appetite fine. No weight loss. Denies diarrhea, abdominal pain, hematemesis or blood in stool.    URINARY: No flank pain or hematuria.  PERIPHERAL VASCULAR: No claudication or cyanosis.  MUSCULOSKELETAL: No joint stiffness or swelling. Denies back pain.  NEUROLOGIC: No history of seizures, paralysis, alteration of gait or coordination.    SOCIAL HISTORY: Unchanged since recent note by primary care physician.    PHYSICAL EXAMINATION:   There were no vitals taken for this visit.      APPEARANCE: Well nourished, well developed, in no acute distress.    HEAD: Normocephalic, atraumatic.  EYES: PERRL. EOMI.  Conjunctivae without injection and  anicteric  NEUROLOGIC:       Norton Coma Scale 15      Normal speech development.      Hearing normal.  PSYCHIATRIC: Patient is alert and oriented x3.  Thought processes are all normal.  There is no homicidality.  There is no suicidality.  There is no evidence of psychosis.    LABORATORY/RADIOLOGY:Chart reviewed from GYN.    ASSESSMENT:   Uterine fibroids  New onset headache  Depression and anxiety, resolved  Insomnia, mild    PLAN:   Patient is medically optimized and cleared for general anesthesia and gynecological surgery.  Neurology following    Answers for HPI/ROS submitted by the patient on 11/30/2020   activity change: Yes  unexpected weight change: No  neck pain: No  hearing loss: No  rhinorrhea: No  trouble swallowing: No  eye discharge: No  visual disturbance: No  chest tightness: No  wheezing: No  chest pain: No  palpitations: No  blood in stool: No  constipation: No  vomiting: No  diarrhea: No  polydipsia: No  polyuria: No  difficulty urinating: Yes  hematuria: No  menstrual problem: Yes  dysuria: Yes  joint swelling: Yes  arthralgias: Yes  headaches: Yes  weakness: No  confusion: No  dysphoric mood: No

## 2020-12-01 ENCOUNTER — PATIENT OUTREACH (OUTPATIENT)
Dept: ADMINISTRATIVE | Facility: OTHER | Age: 45
End: 2020-12-01

## 2020-12-01 NOTE — PROGRESS NOTES
Care Everywhere: updated  Immunization: updated, links delay   Health Maintenance: updated  Media Review: review for outside mammogram report   Legacy Review:   Order placed:   Upcoming appts:

## 2020-12-02 ENCOUNTER — OFFICE VISIT (OUTPATIENT)
Dept: UROGYNECOLOGY | Facility: CLINIC | Age: 45
End: 2020-12-02
Payer: COMMERCIAL

## 2020-12-02 VITALS
SYSTOLIC BLOOD PRESSURE: 109 MMHG | BODY MASS INDEX: 21.73 KG/M2 | HEART RATE: 70 BPM | HEIGHT: 67 IN | WEIGHT: 138.44 LBS | DIASTOLIC BLOOD PRESSURE: 62 MMHG

## 2020-12-02 DIAGNOSIS — N39.41 URGE INCONTINENCE: ICD-10-CM

## 2020-12-02 DIAGNOSIS — N81.6 RECTOCELE: ICD-10-CM

## 2020-12-02 DIAGNOSIS — Z01.818 PREOPERATIVE EXAM FOR GYNECOLOGIC SURGERY: Primary | ICD-10-CM

## 2020-12-02 DIAGNOSIS — N81.11 MIDLINE CYSTOCELE: ICD-10-CM

## 2020-12-02 DIAGNOSIS — D25.9 UTERINE LEIOMYOMA, UNSPECIFIED LOCATION: ICD-10-CM

## 2020-12-02 PROCEDURE — 99999 PR PBB SHADOW E&M-EST. PATIENT-LVL III: CPT | Mod: PBBFAC,,, | Performed by: NURSE PRACTITIONER

## 2020-12-02 PROCEDURE — 99499 NO LOS: ICD-10-PCS | Mod: S$GLB,,, | Performed by: NURSE PRACTITIONER

## 2020-12-02 PROCEDURE — 3008F PR BODY MASS INDEX (BMI) DOCUMENTED: ICD-10-PCS | Mod: CPTII,S$GLB,, | Performed by: NURSE PRACTITIONER

## 2020-12-02 PROCEDURE — 1126F AMNT PAIN NOTED NONE PRSNT: CPT | Mod: S$GLB,,, | Performed by: NURSE PRACTITIONER

## 2020-12-02 PROCEDURE — 3008F BODY MASS INDEX DOCD: CPT | Mod: CPTII,S$GLB,, | Performed by: NURSE PRACTITIONER

## 2020-12-02 PROCEDURE — 99499 UNLISTED E&M SERVICE: CPT | Mod: S$GLB,,, | Performed by: NURSE PRACTITIONER

## 2020-12-02 PROCEDURE — 99999 PR PBB SHADOW E&M-EST. PATIENT-LVL III: ICD-10-PCS | Mod: PBBFAC,,, | Performed by: NURSE PRACTITIONER

## 2020-12-02 PROCEDURE — 1126F PR PAIN SEVERITY QUANTIFIED, NO PAIN PRESENT: ICD-10-PCS | Mod: S$GLB,,, | Performed by: NURSE PRACTITIONER

## 2020-12-02 NOTE — PROGRESS NOTES
Urogyn follow up  12/02/2020  .  [facility] UROGYNECOLOGY-[id number]   [address] [address]    Lynne Velasco  [id number]  1975      Lynne Velasco is a 45 y.o. here for a urogyn preop visit.    Last HPI from 10/27/2020  Lynne Velasco is a 45 y.o. here for a urogyn follow up for incomplete bladder emptying.  1000+ urinary retention suddenly 2 days ago.  Has been having intermittent constipation. Occasionally feels like she is not emptying her bladder    Pelvic:  Ext. Genitalia: normal external genitalia. Normal bartholin's and skeens glands  Vagina: + (mild)  atrophy. Normal vaginal mucosa without lesions. No discharge noted.   Non-tender bladder base without palpable mass.  Cervix: no lesions  Uterus:  enlarged to 12 week's size; multiple fibriods palpable--bulky, anterior fibroid palpable to UVJ  (most of anterior vaginal wall) +TTP in LLQ  Urethra: no masses or tenderness  Urethral meatus: no lesions, caruncle or prolapse. Resistance inserting catheter     POP-Q:  Aa -1; Ba -1; C -6; Ap 0; Bp 0; D -8.  Genital hiatus 2, perineal body 2 total vaginal length 11.        Last HPI from 06/10/2019     1. Urinary urgency vs. Frequent uti  --denies SUZI  --rare UUI --wearing liner pad occasionally  --voiding every hour  --nocturia 1-2 times/ night-- limits fluids at least 2 hours prior to bedtime      2. Constipation  --still having some issues: will start fiber  --eats high fiber diet  --incomplete evacuation+     3)recurrent uti  --asymptomatic at this  2/28/2019 e. coli  11/26/2018 e. coli  11/20/2017 e. Coli    11/04/2020  Suds  Urine dipstick: neg.     1.  VOIDING PHASE:       a.  Uroflowmetry:  · Prolapse reduction: No  · Voided volume:  96 mL   · PVR:   25 mL     The overall configuration of this uroflow study was with insufficient volume for interpretation.       b.  Pressure flow:  · Prolapse reduction: No  · Voided volume:   532  mL  · Voiding time:   >5 min   · Peak flow:  12 mL/s   · Avg flow:  2 mL/s  · Max det pressure:  52  cm H20  · Det pressure at max flow: negative reading as pves catheter was dislodged during study.   · Void initiated by detrusor contraction.    · Urethral relaxation (EMG):  absent.    · PVR (calculated):  68 mL     The overall configuration of this pressure flow study was prolonged with concern for voiding dysfunction.       2.  FILLING PHASE:  · 1st desire: 31 mL  · Normal desire:  150 mL  · Strong desire:  410 mL  · Urgency:  600 mL  · Compliance (calculated)  approx 100 mL/cm H20  · EMG activity during filling:  stable  · Detrusor contractions observed: No.      3.  URETHRAL FUNCTION/STORAGE PHASE:     a.  WITH prolapse reduction:  · CLPP (150 mL): Negative  at  94 cm H20  · VLPP (150 mL): Negative  at  84 cm H20   · CLPP (300 mL): Negative  at  98 cm H20  · VLPP (300 mL): Negative  at  98 cm H20   · CLPP (450 mL): Negative  at  97 cm H20  · VLPP (450 mL): Negative  at  76 cm H20   · CLPP (MAX ):    Negative  at  75 cm H20  · VLPP (MAX):     Negative  at  102 cm H20  · NEG CLPP and VLPP at max cap once pves catheter removed     These findings are consistent with Negative urodynamic stress incontinence.     Assessment:  UF with insufficient volume for interpretation.  PF prolonged with concern for voiding dysfunction.  Compliance normal.  Max capacity 600 mL.  DO (--).  SUZI (--).      Cysto  Findings: Urethroscopy:  Normal.  Cystoscopy:  Normal bladder mucosa--able to see impression of large anterior fibroid just beneath bladder mucosa at bladder base & cephalad to trigone, bilateral ureteral flow was noted.   Assessment: Essentially normal cystourethroscopy with evidence of large, anterior fibroid deep to bladder base.    11/09/2020  Pelvic MRI  UTERUS:   --mid position measuring 11.3 x 9.5 x 10.8 cm cm.   --Endometrial thickness measures not well visualized yet not definitively  thickened.   *Fibroids   --Approximately 1 large fibroids seen   --Fibroid enhancement: Minimally heterogeneous   --Largest fibroid measures 9.2 x 7.1 x 8.1 cm and is located RIGHT mid myometrial level.   CERVIX AND VAGINA: No cervical fibroid.  Nabothian cysts.   OVARIES: Normal.   PELVIS/LOWER ABDOMEN   LYMPH NODES: None enlarged.   URINARY BLADDER: Mildly displaced by the enlarged uterus.   VESSELS: Normal.   MUSCULOSKELETAL: No abnormal marrow signal or muscle edema.   MISCELLANEOUS: None.   Impression:   Dominant large RIGHT mid uterine fibroid measuring 9.2 x 7.1 x 8.1 cm.          Past Medical History:   Diagnosis Date    Abnormal Pap smear 1993    cryo for dysplasia    Anxiety     Depression     History of psychiatric care     prozac, lexapro, ativan    Mental disorder     Depression    Psychiatric problem     Therapy        Past Surgical History:   Procedure Laterality Date    AUGMENTATION OF BREAST      saline, 2010    Breast implants 1998 1998    Cryo 1993      DILATION AND CURETTAGE OF UTERUS      DILATION AND CURETTAGE OF UTERUS      ENDOMETRIAL ABLATION  July 11,2014    Hysteroscopy with Novasure endometrial ablation       Family History   Problem Relation Age of Onset    Diabetes Father     Stroke Father     Diabetes Mother     Hypertension Mother     Coronary artery disease Mother     Depression Mother     Seizures Mother     Ovarian cancer Maternal Aunt     Breast cancer Maternal Aunt     Breast cancer Paternal Aunt     Colon cancer Neg Hx     Suicide Neg Hx        Social History     Socioeconomic History    Marital status:      Spouse name: Not on file    Number of children: Not on file    Years of education: Not on file    Highest education level: Not on file   Occupational History     Employer: [employer]   Social Needs    Financial resource strain: Not on file    Food insecurity     Worry: Not on file     Inability: Not on file    Transportation needs      Medical: Not on file     Non-medical: Not on file   Tobacco Use    Smoking status: Former Smoker     Packs/day: 0.50     Years: 5.00     Pack years: 2.50     Types: Cigarettes     Quit date: 10/15/1999     Years since quittin.1    Smokeless tobacco: Never Used   Substance and Sexual Activity    Alcohol use: Yes     Alcohol/week: 2.0 standard drinks     Types: 2 Glasses of wine per week     Comment: social    Drug use: No    Sexual activity: Yes     Partners: Male     Birth control/protection: Surgical, None     Comment: Spouse had Vasectomy,  since    Lifestyle    Physical activity     Days per week: Not on file     Minutes per session: Not on file    Stress: Not on file   Relationships    Social connections     Talks on phone: Not on file     Gets together: Not on file     Attends Denominational service: Not on file     Active member of club or organization: Not on file     Attends meetings of clubs or organizations: Not on file     Relationship status: Not on file   Other Topics Concern    Patient feels they ought to cut down on drinking/drug use Not Asked    Patient annoyed by others criticizing their drinking/drug use Not Asked    Patient has felt bad or guilty about drinking/drug use Not Asked    Patient has had a drink/used drugs as an eye opener in the AM Not Asked   Social History Narrative    - 14 years    From [facility]    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 90 tablet 3    diclofenac (VOLTAREN) 50 MG EC tablet Take 1 tablet (50 mg total) by mouth 2 (two) times daily as needed (headache). 30 tablet 5    escitalopram oxalate (LEXAPRO) 10 MG tablet escitalopram 10 mg tablet   TK 2 TS PO ONCE DAILY      finasteride (PROSCAR) 5 mg tablet  "TK 1 T PO QD      LORazepam (ATIVAN) 0.5 MG tablet lorazepam 0.5 mg tablet      melatonin 10 mg Cap Take by mouth.      spironolactone (ALDACTONE) 50 MG tablet TK 1 T PO QD FOR 2 WEEKS THEN 2 TS QD FOR 2 WEEKS THEN 3 TS QD THEREAFTER AS TOLERATED  5    sumatriptan (IMITREX) 100 MG tablet Take 1 tablet (100 mg total) by mouth every 2 (two) hours as needed for Migraine. No more than twice in a 24 hour period 10 tablet 11    zolpidem (AMBIEN) 5 MG Tab zolpidem 5 mg tablet      bethanechol (URECHOLINE) 5 MG Tab Take 1 tablet (5 mg total) by mouth 3 (three) times daily. for 14 days 42 tablet 0     No current facility-administered medications for this visit.        Review of patient's allergies indicates:   Allergen Reactions    Effexor [venlafaxine] Palpitations       Well woman:  Pap test: 11/26/2018 normal neg hpv.  History of abnormal paps: Yes - cryo in the past.  History of STIs:  No  Mammogram: Date of last: 01/2020 normal elevated TC score --will send to breast surgery if next mammogram still shows elevated TC score    ROS:  As per HPI.      Exam  /62   Pulse 70   Ht 5' 7" (1.702 m)   Wt 62.8 kg (138 lb 7.2 oz)   BMI 21.68 kg/m²   General: alert and oriented, no acute distress  Respiratory: normal respiratory effort  Abd: soft, non-tender, non-distended    Pelvic--deferred    Impression  1. Preoperative exam for gynecologic surgery     2. Uterine leiomyoma, unspecified location     3. Midline cystocele     4. Rectocele     5. Urge incontinence       We reviewed the above issues and discussed options for short-term versus long-term management of her problems.   Plan:   1. Patient consented with Dr. Mcdonnell for robotic assisted laparoscopic hysterectomy and sacrocolpopexy with synthetic mesh, possible uterosacral suspension vs sacrospinous ligament fixation, possible anterior/posterior repair with perineorrhaphy, removal of tubes, possible removal of ovaries (no FH of breast or ovarian cancers) " possible laparotomy, possible placement of synthetic midurethral sling (did not leak during urodynamics), and cystourethroscopy.   R/B/A reviewed. Specific risks reviewed include:  infection, bleeding, need for blood transfusion, damage to surrounding structures, anesthesia risks, death, heart attack, stroke, mesh erosion/extrusion, pain, dyspareunia, urinary retention, voiding dysfunction, urinary incontinence, exacerbation of urinary urge incontinence, and need for further surgeries.  We reviewed potential for failure of POP defect repair and need for future surgery, with no way of predicting risk.  She understands success rate of ASC approaches 85%.  Success rate of midurethral sling for SUZI was reviewed as 80-85%, and she understands that this will not necessarily impact other types of urinary incontinence.  Alternatives reviewed include: pessary/PT for POP and pessary/periurethral injections/PT/medication for SUZI.    2. T&S, urine HCG on DOS  3. Preoperative appointment with PCP or cardiology: Yes -cleared  4. VTE Prophylaxis:  heparin 5000 u SQ TID (1st dose 2hrs preop) + SCDs  5. Patient instructed on Magnesium citrate and chlorahexadine/dial soap prep to perform day before & AM of surgery.   6. Proceed to OR for above-mentioned procedure.    30 minutes were spent in face to face time with this patient  90 % of this time was spent in counseling and/or coordination of care    NADER Figueroa-BC [facility]  Division of Female Pelvic Medicine and Reconstructive Surgery  Department of Obstetrics & Gynecology

## 2020-12-02 NOTE — H&P (VIEW-ONLY)
Urogyn follow up  12/02/2020  .  [facility] UROGYNECOLOGY-[id number]   [address] [id number]    Lynne Velasco  [id number]  1975      Lynne Velasco is a 45 y.o. here for a urogyn preop visit.    Last HPI from 10/27/2020  Lynne Velasco is a 45 y.o. here for a urogyn follow up for incomplete bladder emptying.  1000+ urinary retention suddenly 2 days ago.  Has been having intermittent constipation. Occasionally feels like she is not emptying her bladder    Pelvic:  Ext. Genitalia: normal external genitalia. Normal bartholin's and skeens glands  Vagina: + (mild)  atrophy. Normal vaginal mucosa without lesions. No discharge noted.   Non-tender bladder base without palpable mass.  Cervix: no lesions  Uterus:  enlarged to 12 week's size; multiple fibriods palpable--bulky, anterior fibroid palpable to UVJ  (most of anterior vaginal wall) +TTP in LLQ  Urethra: no masses or tenderness  Urethral meatus: no lesions, caruncle or prolapse. Resistance inserting catheter     POP-Q:  Aa -1; Ba -1; C -6; Ap 0; Bp 0; D -8.  Genital hiatus 2, perineal body 2 total vaginal length 11.        Last HPI from 06/10/2019     1. Urinary urgency vs. Frequent uti  --denies SUZI  --rare UUI --wearing liner pad occasionally  --voiding every hour  --nocturia 1-2 times/ night-- limits fluids at least 2 hours prior to bedtime      2. Constipation  --still having some issues: will start fiber  --eats high fiber diet  --incomplete evacuation+     3)recurrent uti  --asymptomatic at this  2/28/2019 e. coli  11/26/2018 e. coli  11/20/2017 e. Coli    11/04/2020  Suds  Urine dipstick: neg.     1.  VOIDING PHASE:       a.  Uroflowmetry:  · Prolapse reduction: No  · Voided volume:  96 mL   · PVR:   25 mL     The overall configuration of this uroflow study was with insufficient volume for interpretation.       b.  Pressure flow:  · Prolapse reduction: No  · Voided volume:   532  mL  · Voiding time:   >5 min   · Peak flow:  12 mL/s   · Avg flow:  2 mL/s  · Max det pressure:  52  cm H20  · Det pressure at max flow: negative reading as pves catheter was dislodged during study.   · Void initiated by detrusor contraction.    · Urethral relaxation (EMG):  absent.    · PVR (calculated):  68 mL     The overall configuration of this pressure flow study was prolonged with concern for voiding dysfunction.       2.  FILLING PHASE:  · 1st desire: 31 mL  · Normal desire:  150 mL  · Strong desire:  410 mL  · Urgency:  600 mL  · Compliance (calculated)  approx 100 mL/cm H20  · EMG activity during filling:  stable  · Detrusor contractions observed: No.      3.  URETHRAL FUNCTION/STORAGE PHASE:     a.  WITH prolapse reduction:  · CLPP (150 mL): Negative  at  94 cm H20  · VLPP (150 mL): Negative  at  84 cm H20   · CLPP (300 mL): Negative  at  98 cm H20  · VLPP (300 mL): Negative  at  98 cm H20   · CLPP (450 mL): Negative  at  97 cm H20  · VLPP (450 mL): Negative  at  76 cm H20   · CLPP (MAX ):    Negative  at  75 cm H20  · VLPP (MAX):     Negative  at  102 cm H20  · NEG CLPP and VLPP at max cap once pves catheter removed     These findings are consistent with Negative urodynamic stress incontinence.     Assessment:  UF with insufficient volume for interpretation.  PF prolonged with concern for voiding dysfunction.  Compliance normal.  Max capacity 600 mL.  DO (--).  SUZI (--).      Cysto  Findings: Urethroscopy:  Normal.  Cystoscopy:  Normal bladder mucosa--able to see impression of large anterior fibroid just beneath bladder mucosa at bladder base & cephalad to trigone, bilateral ureteral flow was noted.   Assessment: Essentially normal cystourethroscopy with evidence of large, anterior fibroid deep to bladder base.    11/09/2020  Pelvic MRI  UTERUS:   --mid position measuring 11.3 x 9.5 x 10.8 cm cm.   --Endometrial thickness measures not well visualized yet not definitively  thickened.   *Fibroids   --Approximately 1 large fibroids seen   --Fibroid enhancement: Minimally heterogeneous   --Largest fibroid measures 9.2 x 7.1 x 8.1 cm and is located RIGHT mid myometrial level.   CERVIX AND VAGINA: No cervical fibroid.  Nabothian cysts.   OVARIES: Normal.   PELVIS/LOWER ABDOMEN   LYMPH NODES: None enlarged.   URINARY BLADDER: Mildly displaced by the enlarged uterus.   VESSELS: Normal.   MUSCULOSKELETAL: No abnormal marrow signal or muscle edema.   MISCELLANEOUS: None.   Impression:   Dominant large RIGHT mid uterine fibroid measuring 9.2 x 7.1 x 8.1 cm.          Past Medical History:   Diagnosis Date    Abnormal Pap smear 1993    cryo for dysplasia    Anxiety     Depression     History of psychiatric care     prozac, lexapro, ativan    Mental disorder     Depression    Psychiatric problem     Therapy        Past Surgical History:   Procedure Laterality Date    AUGMENTATION OF BREAST      saline, 2010    Breast implants 1998 1998    Cryo 1993      DILATION AND CURETTAGE OF UTERUS      DILATION AND CURETTAGE OF UTERUS      ENDOMETRIAL ABLATION  July 11,2014    Hysteroscopy with Novasure endometrial ablation       Family History   Problem Relation Age of Onset    Diabetes Father     Stroke Father     Diabetes Mother     Hypertension Mother     Coronary artery disease Mother     Depression Mother     Seizures Mother     Ovarian cancer Maternal Aunt     Breast cancer Maternal Aunt     Breast cancer Paternal Aunt     Colon cancer Neg Hx     Suicide Neg Hx        Social History     Socioeconomic History    Marital status:      Spouse name: Not on file    Number of children: Not on file    Years of education: Not on file    Highest education level: Not on file   Occupational History     Employer: [employer]   Social Needs    Financial resource strain: Not on file    Food insecurity     Worry: Not on file     Inability: Not on file    Transportation needs      Medical: Not on file     Non-medical: Not on file   Tobacco Use    Smoking status: Former Smoker     Packs/day: 0.50     Years: 5.00     Pack years: 2.50     Types: Cigarettes     Quit date: 10/15/1999     Years since quittin.1    Smokeless tobacco: Never Used   Substance and Sexual Activity    Alcohol use: Yes     Alcohol/week: 2.0 standard drinks     Types: 2 Glasses of wine per week     Comment: social    Drug use: No    Sexual activity: Yes     Partners: Male     Birth control/protection: Surgical, None     Comment: Spouse had Vasectomy,  since    Lifestyle    Physical activity     Days per week: Not on file     Minutes per session: Not on file    Stress: Not on file   Relationships    Social connections     Talks on phone: Not on file     Gets together: Not on file     Attends Yazdanism service: Not on file     Active member of club or organization: Not on file     Attends meetings of clubs or organizations: Not on file     Relationship status: Not on file   Other Topics Concern    Patient feels they ought to cut down on drinking/drug use Not Asked    Patient annoyed by others criticizing their drinking/drug use Not Asked    Patient has felt bad or guilty about drinking/drug use Not Asked    Patient has had a drink/used drugs as an eye opener in the AM Not Asked   Social History Narrative    - 14 years    From [facility]    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 90 tablet 3    diclofenac (VOLTAREN) 50 MG EC tablet Take 1 tablet (50 mg total) by mouth 2 (two) times daily as needed (headache). 30 tablet 5    escitalopram oxalate (LEXAPRO) 10 MG tablet escitalopram 10 mg tablet   TK 2 TS PO ONCE DAILY      finasteride (PROSCAR) 5 mg tablet  "TK 1 T PO QD      LORazepam (ATIVAN) 0.5 MG tablet lorazepam 0.5 mg tablet      melatonin 10 mg Cap Take by mouth.      spironolactone (ALDACTONE) 50 MG tablet TK 1 T PO QD FOR 2 WEEKS THEN 2 TS QD FOR 2 WEEKS THEN 3 TS QD THEREAFTER AS TOLERATED  5    sumatriptan (IMITREX) 100 MG tablet Take 1 tablet (100 mg total) by mouth every 2 (two) hours as needed for Migraine. No more than twice in a 24 hour period 10 tablet 11    zolpidem (AMBIEN) 5 MG Tab zolpidem 5 mg tablet      bethanechol (URECHOLINE) 5 MG Tab Take 1 tablet (5 mg total) by mouth 3 (three) times daily. for 14 days 42 tablet 0     No current facility-administered medications for this visit.        Review of patient's allergies indicates:   Allergen Reactions    Effexor [venlafaxine] Palpitations       Well woman:  Pap test: 11/26/2018 normal neg hpv.  History of abnormal paps: Yes - cryo in the past.  History of STIs:  No  Mammogram: Date of last: 01/2020 normal elevated TC score --will send to breast surgery if next mammogram still shows elevated TC score    ROS:  As per HPI.      Exam  /62   Pulse 70   Ht 5' 7" (1.702 m)   Wt 62.8 kg (138 lb 7.2 oz)   BMI 21.68 kg/m²   General: alert and oriented, no acute distress  Respiratory: normal respiratory effort  Abd: soft, non-tender, non-distended    Pelvic--deferred    Impression  1. Preoperative exam for gynecologic surgery     2. Uterine leiomyoma, unspecified location     3. Midline cystocele     4. Rectocele     5. Urge incontinence       We reviewed the above issues and discussed options for short-term versus long-term management of her problems.   Plan:   1. Patient consented with Dr. Mcdonnell for robotic assisted laparoscopic hysterectomy and sacrocolpopexy with synthetic mesh, possible uterosacral suspension vs sacrospinous ligament fixation, possible anterior/posterior repair with perineorrhaphy, removal of tubes, possible removal of ovaries (no FH of breast or ovarian cancers) " possible laparotomy, possible placement of synthetic midurethral sling (did not leak during urodynamics), and cystourethroscopy.   R/B/A reviewed. Specific risks reviewed include:  infection, bleeding, need for blood transfusion, damage to surrounding structures, anesthesia risks, death, heart attack, stroke, mesh erosion/extrusion, pain, dyspareunia, urinary retention, voiding dysfunction, urinary incontinence, exacerbation of urinary urge incontinence, and need for further surgeries.  We reviewed potential for failure of POP defect repair and need for future surgery, with no way of predicting risk.  She understands success rate of ASC approaches 85%.  Success rate of midurethral sling for SUZI was reviewed as 80-85%, and she understands that this will not necessarily impact other types of urinary incontinence.  Alternatives reviewed include: pessary/PT for POP and pessary/periurethral injections/PT/medication for SUZI.    2. T&S, urine HCG on DOS  3. Preoperative appointment with PCP or cardiology: Yes -cleared  4. VTE Prophylaxis:  heparin 5000 u SQ TID (1st dose 2hrs preop) + SCDs  5. Patient instructed on Magnesium citrate and chlorahexadine/dial soap prep to perform day before & AM of surgery.   6. Proceed to OR for above-mentioned procedure.    30 minutes were spent in face to face time with this patient  90 % of this time was spent in counseling and/or coordination of care    NADER Figueroa-BC [facility]  Division of Female Pelvic Medicine and Reconstructive Surgery  Department of Obstetrics & Gynecology

## 2020-12-04 ENCOUNTER — HOSPITAL ENCOUNTER (OUTPATIENT)
Dept: PREADMISSION TESTING | Facility: OTHER | Age: 45
Discharge: HOME | End: 2020-12-04
Attending: OBSTETRICS & GYNECOLOGY
Payer: COMMERCIAL

## 2020-12-04 ENCOUNTER — TELEPHONE (OUTPATIENT)
Dept: OBSTETRICS AND GYNECOLOGY | Facility: CLINIC | Age: 45
End: 2020-12-04

## 2020-12-04 ENCOUNTER — ANESTHESIA EVENT (OUTPATIENT)
Dept: SURGERY | Facility: OTHER | Age: 45
End: 2020-12-04
Payer: COMMERCIAL

## 2020-12-04 VITALS
HEART RATE: 77 BPM | OXYGEN SATURATION: 99 % | WEIGHT: 140 LBS | SYSTOLIC BLOOD PRESSURE: 114 MMHG | TEMPERATURE: 98 F | HEIGHT: 67 IN | DIASTOLIC BLOOD PRESSURE: 70 MMHG | BODY MASS INDEX: 21.97 KG/M2

## 2020-12-04 DIAGNOSIS — Z01.818 PREOP TESTING: ICD-10-CM

## 2020-12-04 LAB
ABO + RH BLD: NORMAL
ANION GAP SERPL CALC-SCNC: 4 MMOL/L (ref 8–16)
BASOPHILS # BLD AUTO: 0.05 K/UL (ref 0–0.2)
BASOPHILS NFR BLD: 0.5 % (ref 0–1.9)
BLD GP AB SCN CELLS X3 SERPL QL: NORMAL
BUN SERPL-MCNC: 22 MG/DL (ref 6–20)
CALCIUM SERPL-MCNC: 9.1 MG/DL (ref 8.7–10.5)
CHLORIDE SERPL-SCNC: 101 MMOL/L (ref 95–110)
CO2 SERPL-SCNC: 26 MMOL/L (ref 23–29)
CREAT SERPL-MCNC: 0.9 MG/DL (ref 0.5–1.4)
DIFFERENTIAL METHOD: ABNORMAL
EOSINOPHIL # BLD AUTO: 0.1 K/UL (ref 0–0.5)
EOSINOPHIL NFR BLD: 1.4 % (ref 0–8)
ERYTHROCYTE [DISTWIDTH] IN BLOOD BY AUTOMATED COUNT: 11.9 % (ref 11.5–14.5)
EST. GFR  (AFRICAN AMERICAN): >60 ML/MIN/1.73 M^2
EST. GFR  (NON AFRICAN AMERICAN): >60 ML/MIN/1.73 M^2
GLUCOSE SERPL-MCNC: 82 MG/DL (ref 70–110)
HCT VFR BLD AUTO: 40.9 % (ref 37–48.5)
HGB BLD-MCNC: 13.6 G/DL (ref 12–16)
IMM GRANULOCYTES # BLD AUTO: 0.03 K/UL (ref 0–0.04)
IMM GRANULOCYTES NFR BLD AUTO: 0.3 % (ref 0–0.5)
LYMPHOCYTES # BLD AUTO: 2.9 K/UL (ref 1–4.8)
LYMPHOCYTES NFR BLD: 31 % (ref 18–48)
MCH RBC QN AUTO: 28.9 PG (ref 27–31)
MCHC RBC AUTO-ENTMCNC: 33.3 G/DL (ref 32–36)
MCV RBC AUTO: 87 FL (ref 82–98)
MONOCYTES # BLD AUTO: 0.7 K/UL (ref 0.3–1)
MONOCYTES NFR BLD: 7.3 % (ref 4–15)
NEUTROPHILS # BLD AUTO: 5.6 K/UL (ref 1.8–7.7)
NEUTROPHILS NFR BLD: 59.5 % (ref 38–73)
NRBC BLD-RTO: 0 /100 WBC
PLATELET # BLD AUTO: 437 K/UL (ref 150–350)
PMV BLD AUTO: 8.9 FL (ref 9.2–12.9)
POTASSIUM SERPL-SCNC: 4.4 MMOL/L (ref 3.5–5.1)
RBC # BLD AUTO: 4.71 M/UL (ref 4–5.4)
SODIUM SERPL-SCNC: 131 MMOL/L (ref 136–145)
WBC # BLD AUTO: 9.36 K/UL (ref 3.9–12.7)

## 2020-12-04 PROCEDURE — 93010 EKG 12-LEAD: ICD-10-PCS | Mod: ,,, | Performed by: INTERNAL MEDICINE

## 2020-12-04 PROCEDURE — 80048 BASIC METABOLIC PNL TOTAL CA: CPT

## 2020-12-04 PROCEDURE — 85025 COMPLETE CBC W/AUTO DIFF WBC: CPT

## 2020-12-04 PROCEDURE — 36415 COLL VENOUS BLD VENIPUNCTURE: CPT

## 2020-12-04 PROCEDURE — 93005 ELECTROCARDIOGRAM TRACING: CPT

## 2020-12-04 PROCEDURE — U0003 INFECTIOUS AGENT DETECTION BY NUCLEIC ACID (DNA OR RNA); SEVERE ACUTE RESPIRATORY SYNDROME CORONAVIRUS 2 (SARS-COV-2) (CORONAVIRUS DISEASE [COVID-19]), AMPLIFIED PROBE TECHNIQUE, MAKING USE OF HIGH THROUGHPUT TECHNOLOGIES AS DESCRIBED BY CMS-2020-01-R: HCPCS

## 2020-12-04 PROCEDURE — 86850 RBC ANTIBODY SCREEN: CPT

## 2020-12-04 PROCEDURE — 93010 ELECTROCARDIOGRAM REPORT: CPT | Mod: ,,, | Performed by: INTERNAL MEDICINE

## 2020-12-04 RX ORDER — SODIUM CHLORIDE, SODIUM LACTATE, POTASSIUM CHLORIDE, CALCIUM CHLORIDE 600; 310; 30; 20 MG/100ML; MG/100ML; MG/100ML; MG/100ML
INJECTION, SOLUTION INTRAVENOUS CONTINUOUS
Status: CANCELLED | OUTPATIENT
Start: 2020-12-04

## 2020-12-04 RX ORDER — ACETAMINOPHEN 500 MG
1000 TABLET ORAL
Status: CANCELLED | OUTPATIENT
Start: 2020-12-04 | End: 2020-12-04

## 2020-12-04 RX ORDER — CELECOXIB 200 MG/1
400 CAPSULE ORAL
Status: CANCELLED | OUTPATIENT
Start: 2020-12-04 | End: 2020-12-04

## 2020-12-04 RX ORDER — FAMOTIDINE 20 MG/1
20 TABLET, FILM COATED ORAL
Status: CANCELLED | OUTPATIENT
Start: 2020-12-04 | End: 2020-12-04

## 2020-12-04 RX ORDER — LIDOCAINE HYDROCHLORIDE 10 MG/ML
0.5 INJECTION, SOLUTION EPIDURAL; INFILTRATION; INTRACAUDAL; PERINEURAL ONCE
Status: CANCELLED | OUTPATIENT
Start: 2020-12-04 | End: 2020-12-04

## 2020-12-04 NOTE — ANESTHESIA PREPROCEDURE EVALUATION
12/04/2020  Lynne Velasco is a 45 y.o., female.    Anesthesia Evaluation    I have reviewed the Patient Summary Reports.    I have reviewed the Nursing Notes. I have reviewed the NPO Status.   I have reviewed the Medications.     Review of Systems  Anesthesia Hx:  No problems with previous Anesthesia    Social:  Former Smoker, Social Alcohol Use    Hematology/Oncology:  Hematology Normal   Oncology Normal     EENT/Dental:EENT/Dental Normal   Cardiovascular:  Cardiovascular Normal Exercise tolerance: good     Pulmonary:  Pulmonary Normal    Renal/:  Renal/ Normal     Hepatic/GI:  Hepatic/GI Normal    Musculoskeletal:  Musculoskeletal Normal    Neurological:   Headaches    Endocrine:  Endocrine Normal    Dermatological:  Skin Normal    Psych:   Psychiatric History anxiety depression          Physical Exam  General:  Well nourished    Airway/Jaw/Neck:  Airway Findings: Mouth Opening: Normal Tongue: Normal  General Airway Assessment: Adult, Good  Mallampati: I  TM Distance: Normal, at least 6 cm  Jaw/Neck Findings:  Neck ROM: Normal ROM      Dental:  Dental Findings: In tact, Molar caps        Mental Status:  Mental Status Findings:  Cooperative, Alert and Oriented         Anesthesia Plan  Type of Anesthesia, risks & benefits discussed:  Anesthesia Type:  general  Patient's Preference:   Intra-op Monitoring Plan: standard ASA monitors  Intra-op Monitoring Plan Comments:   Post Op Pain Control Plan: per primary service following discharge from PACU and multimodal analgesia  Post Op Pain Control Plan Comments:   Induction:    Beta Blocker:         Informed Consent: Patient understands risks and agrees with Anesthesia plan.  Questions answered. Anesthesia consent signed with patient.  ASA Score: 2     Day of Surgery Review of History & Physical:    H&P update referred to the surgeon.     Anesthesia  Plan Notes: Labs per surgeon.        Ready For Surgery From Anesthesia Perspective.

## 2020-12-04 NOTE — DISCHARGE INSTRUCTIONS
Information to Prepare you for your Surgery    PRE-ADMIT TESTING -  [phone]    [address]  [place] [facility]          Your surgery has been scheduled at [facility]. We are pleased to have the opportunity to serve you. For Further Information please call [phone].    On the day of surgery please report to the Information Desk on the 1st floor.    · CONTACT YOUR PHYSICIAN'S OFFICE THE DAY PRIOR TO YOUR SURGERY TO OBTAIN YOUR ARRIVAL TIME.     · The evening before surgery do not eat anything after 9 p.m. ( this includes hard candy, chewing gum and mints).  You may only have GATORADE, POWERADE AND WATER  from 9 p.m. until you leave your home.   DO NOT DRINK ANY LIQUIDS ON THE WAY TO THE HOSPITAL.      SPECIAL MEDICATION INSTRUCTIONS: TAKE medications checked off by the Anesthesiologist on your Medication List.    Angiogram Patients: Take medications as instructed by your physician, including aspirin.     Surgery Patients:    If you take ASPIRIN - Your PHYSICIAN/SURGEON will need to inform you IF/OR when you need to stop taking aspirin prior to your surgery.     Do Not take any medications containing IBUPROFEN.  Do Not Wear any make-up or dark nail polish   (especially eye make-up) to surgery. If you come to surgery with makeup on you will be required to remove the makeup or nail polish.    Do not shave your surgical area at least 5 days prior to your surgery. The surgical prep will be performed at the hospital according to Infection Control regulations.    Leave all valuables at home.   Do Not wear any jewelry or watches, including any metal in body piercings. Jewelry must be removed prior to coming to the hospital.  There is a possibility that rings that are unable to be removed may be cut off if they are on the surgical extremity.    Contact Lens must be removed before surgery. Either do not wear the contact lens or bring a case and solution for  storage.  Please bring a container for eyeglasses or dentures as required.  Bring any paperwork your physician has provided, such as consent forms,  history and physicals, doctor's orders, etc.   Bring comfortable clothes that are loose fitting to wear upon discharge. Take into consideration the type of surgery being performed.  Maintain your diet as advised per your physician the day prior to surgery.      Adequate rest the night before surgery is advised.   Park in the Parking lot behind the hospital or in the [facility] across the street from the parking lot. Parking is complimentary.  If you will be discharged the same day as your procedure, please arrange for a responsible adult to drive you home or to accompany you if traveling by taxi.   YOU WILL NOT BE PERMITTED TO DRIVE OR TO LEAVE THE HOSPITAL ALONE AFTER SURGERY.   If you are being discharged the same day, it is strongly recommended that you arrange for someone to remain with you for the first 24 hrs following your surgery.    The Surgeon will speak to your family/visitor after your surgery regarding the outcome of your surgery and post op care.  The Surgeon may speak to you after your surgery, but there is a possibility you may not remember the details.  Please check with your family members regarding the conversation with the Surgeon.    We strongly recommend whoever is bringing you home be present for discharge instructions.  This will ensure a thorough understanding for your post op home care.    ALL CHILDREN MUST ALWAYS BE ACCOMPANIED BY AN ADULT.    Visitors-Refer to current Visitor policy handouts.    Thank you for your cooperation.  The Staff of [facility].                Bathing Instructions with Hibiclens     Shower the evening before and morning of your procedure with Hibiclens:   Wash your face with water and your regular face wash/soap   Apply Hibiclens directly on your skin or on a wet washcloth and wash  gently. When showering: Move away from the shower stream when applying Hibiclens to avoid rinsing off too soon.   Rinse thoroughly with warm water   Do not dilute Hibiclens         Dry off as usual, do not use any deodorant, powder, body lotions, perfume, after shave or cologne.

## 2020-12-05 LAB — SARS-COV-2 RNA RESP QL NAA+PROBE: NOT DETECTED

## 2020-12-06 ENCOUNTER — PATIENT MESSAGE (OUTPATIENT)
Dept: UROGYNECOLOGY | Facility: CLINIC | Age: 45
End: 2020-12-06

## 2020-12-07 ENCOUNTER — ANESTHESIA (OUTPATIENT)
Dept: SURGERY | Facility: OTHER | Age: 45
End: 2020-12-07
Payer: COMMERCIAL

## 2020-12-07 ENCOUNTER — HOSPITAL ENCOUNTER (OUTPATIENT)
Facility: OTHER | Age: 45
Discharge: HOME | End: 2020-12-08
Attending: OBSTETRICS & GYNECOLOGY | Admitting: OBSTETRICS & GYNECOLOGY
Payer: COMMERCIAL

## 2020-12-07 DIAGNOSIS — D25.9 FIBROID UTERUS: ICD-10-CM

## 2020-12-07 DIAGNOSIS — D25.9 UTERINE LEIOMYOMA, UNSPECIFIED LOCATION: Primary | ICD-10-CM

## 2020-12-07 PROBLEM — N81.6 RECTOCELE: Status: ACTIVE | Noted: 2020-12-07

## 2020-12-07 PROBLEM — N81.11 MIDLINE CYSTOCELE: Status: RESOLVED | Noted: 2020-12-07 | Resolved: 2020-12-07

## 2020-12-07 PROBLEM — M53.82 IMPAIRED RANGE OF MOTION OF CERVICAL SPINE: Status: RESOLVED | Noted: 2020-09-04 | Resolved: 2020-12-07

## 2020-12-07 PROBLEM — G44.221 CHRONIC TENSION-TYPE HEADACHE, INTRACTABLE: Status: RESOLVED | Noted: 2020-09-04 | Resolved: 2020-12-07

## 2020-12-07 PROBLEM — N81.6 RECTOCELE: Status: RESOLVED | Noted: 2020-12-07 | Resolved: 2020-12-07

## 2020-12-07 PROBLEM — N81.11 MIDLINE CYSTOCELE: Status: ACTIVE | Noted: 2020-12-07

## 2020-12-07 LAB
B-HCG UR QL: NEGATIVE
CTP QC/QA: YES
POCT GLUCOSE: 90 MG/DL (ref 70–110)

## 2020-12-07 PROCEDURE — 25000003 PHARM REV CODE 250: Performed by: OBSTETRICS & GYNECOLOGY

## 2020-12-07 PROCEDURE — 71000033 HC RECOVERY, INTIAL HOUR: Performed by: OBSTETRICS & GYNECOLOGY

## 2020-12-07 PROCEDURE — 63600175 PHARM REV CODE 636 W HCPCS: Performed by: OBSTETRICS & GYNECOLOGY

## 2020-12-07 PROCEDURE — 37000008 HC ANESTHESIA 1ST 15 MINUTES: Performed by: OBSTETRICS & GYNECOLOGY

## 2020-12-07 PROCEDURE — 25000003 PHARM REV CODE 250: Performed by: SPECIALIST

## 2020-12-07 PROCEDURE — 58573 PR LAPAROSCOPY TOT HYSTERECTOMY UTERUS >250 GRAM W TUBE/OVARY: ICD-10-PCS | Mod: AS,,, | Performed by: PHYSICIAN ASSISTANT

## 2020-12-07 PROCEDURE — 63600175 PHARM REV CODE 636 W HCPCS: Performed by: SPECIALIST

## 2020-12-07 PROCEDURE — 81025 URINE PREGNANCY TEST: CPT | Performed by: OBSTETRICS & GYNECOLOGY

## 2020-12-07 PROCEDURE — 58573 TLH W/T/O UTERUS OVER 250 G: CPT | Mod: AS,,, | Performed by: PHYSICIAN ASSISTANT

## 2020-12-07 PROCEDURE — 58573 PR LAPAROSCOPY TOT HYSTERECTOMY UTERUS >250 GRAM W TUBE/OVARY: ICD-10-PCS | Mod: ,,, | Performed by: OBSTETRICS & GYNECOLOGY

## 2020-12-07 PROCEDURE — 27201423 OPTIME MED/SURG SUP & DEVICES STERILE SUPPLY: Performed by: OBSTETRICS & GYNECOLOGY

## 2020-12-07 PROCEDURE — 63600175 PHARM REV CODE 636 W HCPCS: Performed by: NURSE ANESTHETIST, CERTIFIED REGISTERED

## 2020-12-07 PROCEDURE — 88307 TISSUE EXAM BY PATHOLOGIST: CPT | Performed by: PATHOLOGY

## 2020-12-07 PROCEDURE — C2628 CATHETER, OCCLUSION: HCPCS | Performed by: OBSTETRICS & GYNECOLOGY

## 2020-12-07 PROCEDURE — 82962 GLUCOSE BLOOD TEST: CPT | Performed by: OBSTETRICS & GYNECOLOGY

## 2020-12-07 PROCEDURE — C1765 ADHESION BARRIER: HCPCS | Performed by: OBSTETRICS & GYNECOLOGY

## 2020-12-07 PROCEDURE — 36000712 HC OR TIME LEV V 1ST 15 MIN: Performed by: OBSTETRICS & GYNECOLOGY

## 2020-12-07 PROCEDURE — P9045 ALBUMIN (HUMAN), 5%, 250 ML: HCPCS | Mod: JG | Performed by: NURSE ANESTHETIST, CERTIFIED REGISTERED

## 2020-12-07 PROCEDURE — 25000003 PHARM REV CODE 250: Performed by: ANESTHESIOLOGY

## 2020-12-07 PROCEDURE — 88307 PR  SURG PATH,LEVEL V: ICD-10-PCS | Mod: 26,,, | Performed by: PATHOLOGY

## 2020-12-07 PROCEDURE — 71000039 HC RECOVERY, EACH ADD'L HOUR: Performed by: OBSTETRICS & GYNECOLOGY

## 2020-12-07 PROCEDURE — 37000009 HC ANESTHESIA EA ADD 15 MINS: Performed by: OBSTETRICS & GYNECOLOGY

## 2020-12-07 PROCEDURE — 36000713 HC OR TIME LEV V EA ADD 15 MIN: Performed by: OBSTETRICS & GYNECOLOGY

## 2020-12-07 PROCEDURE — 25000003 PHARM REV CODE 250: Performed by: NURSE ANESTHETIST, CERTIFIED REGISTERED

## 2020-12-07 PROCEDURE — 58573 TLH W/T/O UTERUS OVER 250 G: CPT | Mod: ,,, | Performed by: OBSTETRICS & GYNECOLOGY

## 2020-12-07 PROCEDURE — 88307 TISSUE EXAM BY PATHOLOGIST: CPT | Mod: 26,,, | Performed by: PATHOLOGY

## 2020-12-07 PROCEDURE — 94799 UNLISTED PULMONARY SVC/PX: CPT

## 2020-12-07 PROCEDURE — 63600175 PHARM REV CODE 636 W HCPCS: Performed by: ANESTHESIOLOGY

## 2020-12-07 DEVICE — BARRIER INTERCEED 3X4 ST DIS: Type: IMPLANTABLE DEVICE | Site: SACRUM | Status: FUNCTIONAL

## 2020-12-07 RX ORDER — KETOROLAC TROMETHAMINE 30 MG/ML
15 INJECTION, SOLUTION INTRAMUSCULAR; INTRAVENOUS ONCE
Status: COMPLETED | OUTPATIENT
Start: 2020-12-07 | End: 2020-12-07

## 2020-12-07 RX ORDER — CELECOXIB 200 MG/1
400 CAPSULE ORAL
Status: COMPLETED | OUTPATIENT
Start: 2020-12-07 | End: 2020-12-07

## 2020-12-07 RX ORDER — ONDANSETRON 2 MG/ML
4 INJECTION INTRAMUSCULAR; INTRAVENOUS DAILY PRN
Status: DISCONTINUED | OUTPATIENT
Start: 2020-12-07 | End: 2020-12-07 | Stop reason: HOSPADM

## 2020-12-07 RX ORDER — FAMOTIDINE 20 MG/1
20 TABLET, FILM COATED ORAL
Status: COMPLETED | OUTPATIENT
Start: 2020-12-07 | End: 2020-12-07

## 2020-12-07 RX ORDER — SODIUM CHLORIDE 9 MG/ML
INJECTION, SOLUTION INTRAVENOUS CONTINUOUS
Status: DISCONTINUED | OUTPATIENT
Start: 2020-12-07 | End: 2020-12-08 | Stop reason: HOSPADM

## 2020-12-07 RX ORDER — ONDANSETRON 2 MG/ML
4 INJECTION INTRAMUSCULAR; INTRAVENOUS EVERY 12 HOURS PRN
Status: DISCONTINUED | OUTPATIENT
Start: 2020-12-07 | End: 2020-12-08 | Stop reason: HOSPADM

## 2020-12-07 RX ORDER — MIDAZOLAM HYDROCHLORIDE 1 MG/ML
INJECTION INTRAMUSCULAR; INTRAVENOUS
Status: DISCONTINUED | OUTPATIENT
Start: 2020-12-07 | End: 2020-12-07

## 2020-12-07 RX ORDER — SODIUM CHLORIDE, SODIUM LACTATE, POTASSIUM CHLORIDE, CALCIUM CHLORIDE 600; 310; 30; 20 MG/100ML; MG/100ML; MG/100ML; MG/100ML
INJECTION, SOLUTION INTRAVENOUS CONTINUOUS
Status: DISCONTINUED | OUTPATIENT
Start: 2020-12-07 | End: 2020-12-07

## 2020-12-07 RX ORDER — METOCLOPRAMIDE HYDROCHLORIDE 5 MG/ML
5 INJECTION INTRAMUSCULAR; INTRAVENOUS EVERY 6 HOURS PRN
Status: DISCONTINUED | OUTPATIENT
Start: 2020-12-07 | End: 2020-12-08 | Stop reason: HOSPADM

## 2020-12-07 RX ORDER — LORAZEPAM 0.5 MG/1
0.5 TABLET ORAL NIGHTLY PRN
Status: DISCONTINUED | OUTPATIENT
Start: 2020-12-07 | End: 2020-12-08 | Stop reason: HOSPADM

## 2020-12-07 RX ORDER — LIDOCAINE HYDROCHLORIDE AND EPINEPHRINE 10; 10 MG/ML; UG/ML
INJECTION, SOLUTION INFILTRATION; PERINEURAL
Status: DISCONTINUED | OUTPATIENT
Start: 2020-12-07 | End: 2020-12-07 | Stop reason: HOSPADM

## 2020-12-07 RX ORDER — PROPOFOL 10 MG/ML
VIAL (ML) INTRAVENOUS
Status: DISCONTINUED | OUTPATIENT
Start: 2020-12-07 | End: 2020-12-07

## 2020-12-07 RX ORDER — MEPERIDINE HYDROCHLORIDE 25 MG/ML
12.5 INJECTION INTRAMUSCULAR; INTRAVENOUS; SUBCUTANEOUS ONCE AS NEEDED
Status: DISCONTINUED | OUTPATIENT
Start: 2020-12-07 | End: 2020-12-07 | Stop reason: HOSPADM

## 2020-12-07 RX ORDER — OXYCODONE HYDROCHLORIDE 5 MG/1
5 TABLET ORAL EVERY 4 HOURS PRN
Status: DISCONTINUED | OUTPATIENT
Start: 2020-12-07 | End: 2020-12-08 | Stop reason: HOSPADM

## 2020-12-07 RX ORDER — ONDANSETRON 2 MG/ML
INJECTION INTRAMUSCULAR; INTRAVENOUS
Status: DISCONTINUED | OUTPATIENT
Start: 2020-12-07 | End: 2020-12-07

## 2020-12-07 RX ORDER — MUPIROCIN 20 MG/G
OINTMENT TOPICAL
Status: DISCONTINUED | OUTPATIENT
Start: 2020-12-07 | End: 2020-12-07 | Stop reason: HOSPADM

## 2020-12-07 RX ORDER — MUPIROCIN 20 MG/G
OINTMENT TOPICAL 2 TIMES DAILY
Status: DISCONTINUED | OUTPATIENT
Start: 2020-12-07 | End: 2020-12-08 | Stop reason: HOSPADM

## 2020-12-07 RX ORDER — CEFAZOLIN SODIUM 1 G/3ML
2 INJECTION, POWDER, FOR SOLUTION INTRAMUSCULAR; INTRAVENOUS
Status: DISCONTINUED | OUTPATIENT
Start: 2020-12-07 | End: 2020-12-07 | Stop reason: HOSPADM

## 2020-12-07 RX ORDER — ALBUMIN HUMAN 50 G/1000ML
SOLUTION INTRAVENOUS CONTINUOUS PRN
Status: DISCONTINUED | OUTPATIENT
Start: 2020-12-07 | End: 2020-12-07

## 2020-12-07 RX ORDER — NEOSTIGMINE METHYLSULFATE 1 MG/ML
INJECTION, SOLUTION INTRAVENOUS
Status: DISCONTINUED | OUTPATIENT
Start: 2020-12-07 | End: 2020-12-07

## 2020-12-07 RX ORDER — DOCUSATE SODIUM 100 MG/1
100 CAPSULE, LIQUID FILLED ORAL 2 TIMES DAILY
Status: DISCONTINUED | OUTPATIENT
Start: 2020-12-07 | End: 2020-12-08 | Stop reason: HOSPADM

## 2020-12-07 RX ORDER — DEXAMETHASONE SODIUM PHOSPHATE 4 MG/ML
INJECTION, SOLUTION INTRA-ARTICULAR; INTRALESIONAL; INTRAMUSCULAR; INTRAVENOUS; SOFT TISSUE
Status: DISCONTINUED | OUTPATIENT
Start: 2020-12-07 | End: 2020-12-07

## 2020-12-07 RX ORDER — ROCURONIUM BROMIDE 10 MG/ML
INJECTION, SOLUTION INTRAVENOUS
Status: DISCONTINUED | OUTPATIENT
Start: 2020-12-07 | End: 2020-12-07

## 2020-12-07 RX ORDER — OXYCODONE HYDROCHLORIDE 5 MG/1
5 TABLET ORAL
Status: DISCONTINUED | OUTPATIENT
Start: 2020-12-07 | End: 2020-12-07 | Stop reason: HOSPADM

## 2020-12-07 RX ORDER — HYDROMORPHONE HYDROCHLORIDE 2 MG/ML
0.4 INJECTION, SOLUTION INTRAMUSCULAR; INTRAVENOUS; SUBCUTANEOUS EVERY 5 MIN PRN
Status: DISCONTINUED | OUTPATIENT
Start: 2020-12-07 | End: 2020-12-07 | Stop reason: HOSPADM

## 2020-12-07 RX ORDER — CYCLOBENZAPRINE HCL 5 MG
10 TABLET ORAL ONCE
Status: COMPLETED | OUTPATIENT
Start: 2020-12-07 | End: 2020-12-07

## 2020-12-07 RX ORDER — BUPIVACAINE HYDROCHLORIDE 5 MG/ML
INJECTION, SOLUTION EPIDURAL; INTRACAUDAL
Status: DISCONTINUED | OUTPATIENT
Start: 2020-12-07 | End: 2020-12-07 | Stop reason: HOSPADM

## 2020-12-07 RX ORDER — HYDROMORPHONE HYDROCHLORIDE 1 MG/ML
0.4 INJECTION, SOLUTION INTRAMUSCULAR; INTRAVENOUS; SUBCUTANEOUS
Status: DISCONTINUED | OUTPATIENT
Start: 2020-12-07 | End: 2020-12-08 | Stop reason: HOSPADM

## 2020-12-07 RX ORDER — AMITRIPTYLINE HYDROCHLORIDE 25 MG/1
25 TABLET, FILM COATED ORAL NIGHTLY PRN
Status: DISCONTINUED | OUTPATIENT
Start: 2020-12-07 | End: 2020-12-08 | Stop reason: HOSPADM

## 2020-12-07 RX ORDER — NALOXONE HCL 0.4 MG/ML
0.02 VIAL (ML) INJECTION
Status: DISCONTINUED | OUTPATIENT
Start: 2020-12-07 | End: 2020-12-08 | Stop reason: HOSPADM

## 2020-12-07 RX ORDER — ACETAMINOPHEN 500 MG
1000 TABLET ORAL
Status: COMPLETED | OUTPATIENT
Start: 2020-12-07 | End: 2020-12-07

## 2020-12-07 RX ORDER — PHENYLEPHRINE HYDROCHLORIDE 10 MG/ML
INJECTION INTRAVENOUS
Status: DISCONTINUED | OUTPATIENT
Start: 2020-12-07 | End: 2020-12-07

## 2020-12-07 RX ORDER — KETAMINE HCL IN 0.9 % NACL 50 MG/5 ML
SYRINGE (ML) INTRAVENOUS
Status: DISCONTINUED | OUTPATIENT
Start: 2020-12-07 | End: 2020-12-07

## 2020-12-07 RX ORDER — SODIUM CHLORIDE 0.9 % (FLUSH) 0.9 %
3 SYRINGE (ML) INJECTION
Status: DISCONTINUED | OUTPATIENT
Start: 2020-12-07 | End: 2020-12-07

## 2020-12-07 RX ORDER — FENTANYL CITRATE 50 UG/ML
INJECTION, SOLUTION INTRAMUSCULAR; INTRAVENOUS
Status: DISCONTINUED | OUTPATIENT
Start: 2020-12-07 | End: 2020-12-07

## 2020-12-07 RX ORDER — LIDOCAINE HYDROCHLORIDE 10 MG/ML
0.5 INJECTION, SOLUTION EPIDURAL; INFILTRATION; INTRACAUDAL; PERINEURAL ONCE
Status: DISCONTINUED | OUTPATIENT
Start: 2020-12-07 | End: 2020-12-07 | Stop reason: HOSPADM

## 2020-12-07 RX ORDER — IBUPROFEN 600 MG/1
600 TABLET ORAL EVERY 6 HOURS
Status: DISCONTINUED | OUTPATIENT
Start: 2020-12-07 | End: 2020-12-08

## 2020-12-07 RX ORDER — TALC
9 POWDER (GRAM) TOPICAL NIGHTLY
Status: DISCONTINUED | OUTPATIENT
Start: 2020-12-07 | End: 2020-12-08 | Stop reason: HOSPADM

## 2020-12-07 RX ORDER — LIDOCAINE HYDROCHLORIDE 20 MG/ML
INJECTION INTRAVENOUS
Status: DISCONTINUED | OUTPATIENT
Start: 2020-12-07 | End: 2020-12-07

## 2020-12-07 RX ORDER — ESCITALOPRAM OXALATE 10 MG/1
10 TABLET ORAL DAILY
Status: DISCONTINUED | OUTPATIENT
Start: 2020-12-07 | End: 2020-12-08 | Stop reason: HOSPADM

## 2020-12-07 RX ADMIN — PHENYLEPHRINE HYDROCHLORIDE 100 MCG: 10 INJECTION INTRAVENOUS at 08:12

## 2020-12-07 RX ADMIN — Medication 9 MG: at 08:12

## 2020-12-07 RX ADMIN — GLYCOPYRROLATE 0.4 MG: 0.2 INJECTION, SOLUTION INTRAMUSCULAR; INTRAVITREAL at 10:12

## 2020-12-07 RX ADMIN — HYDROMORPHONE HYDROCHLORIDE 0.4 MG: 2 INJECTION INTRAMUSCULAR; INTRAVENOUS; SUBCUTANEOUS at 11:12

## 2020-12-07 RX ADMIN — ALBUMIN (HUMAN): 2.5 SOLUTION INTRAVENOUS at 10:12

## 2020-12-07 RX ADMIN — FENTANYL CITRATE 50 MCG: 50 INJECTION, SOLUTION INTRAMUSCULAR; INTRAVENOUS at 09:12

## 2020-12-07 RX ADMIN — IBUPROFEN 600 MG: 600 TABLET, FILM COATED ORAL at 05:12

## 2020-12-07 RX ADMIN — ROCURONIUM BROMIDE 20 MG: 10 SOLUTION INTRAVENOUS at 07:12

## 2020-12-07 RX ADMIN — PHENYLEPHRINE HYDROCHLORIDE 100 MCG: 10 INJECTION INTRAVENOUS at 10:12

## 2020-12-07 RX ADMIN — LIDOCAINE HYDROCHLORIDE 60 MG: 20 INJECTION, SOLUTION INTRAVENOUS at 07:12

## 2020-12-07 RX ADMIN — ALBUMIN (HUMAN): 2.5 SOLUTION INTRAVENOUS at 08:12

## 2020-12-07 RX ADMIN — KETOROLAC TROMETHAMINE 15 MG: 30 INJECTION, SOLUTION INTRAMUSCULAR at 02:12

## 2020-12-07 RX ADMIN — FENTANYL CITRATE 100 MCG: 50 INJECTION, SOLUTION INTRAMUSCULAR; INTRAVENOUS at 07:12

## 2020-12-07 RX ADMIN — ACETAMINOPHEN 1000 MG: 500 TABLET, FILM COATED ORAL at 06:12

## 2020-12-07 RX ADMIN — DOCUSATE SODIUM 100 MG: 100 CAPSULE, LIQUID FILLED ORAL at 02:12

## 2020-12-07 RX ADMIN — MUPIROCIN: 20 OINTMENT TOPICAL at 02:12

## 2020-12-07 RX ADMIN — SODIUM CHLORIDE 125 ML/HR: 0.9 INJECTION, SOLUTION INTRAVENOUS at 01:12

## 2020-12-07 RX ADMIN — FAMOTIDINE 20 MG: 20 TABLET ORAL at 06:12

## 2020-12-07 RX ADMIN — PROMETHAZINE HYDROCHLORIDE 6.25 MG: 25 INJECTION INTRAMUSCULAR; INTRAVENOUS at 11:12

## 2020-12-07 RX ADMIN — SODIUM CHLORIDE, SODIUM LACTATE, POTASSIUM CHLORIDE, AND CALCIUM CHLORIDE: 600; 310; 30; 20 INJECTION, SOLUTION INTRAVENOUS at 06:12

## 2020-12-07 RX ADMIN — MIDAZOLAM HYDROCHLORIDE 2 MG: 1 INJECTION, SOLUTION INTRAMUSCULAR; INTRAVENOUS at 07:12

## 2020-12-07 RX ADMIN — FENTANYL CITRATE 50 MCG: 50 INJECTION, SOLUTION INTRAMUSCULAR; INTRAVENOUS at 07:12

## 2020-12-07 RX ADMIN — ONDANSETRON HYDROCHLORIDE 4 MG: 2 INJECTION INTRAMUSCULAR; INTRAVENOUS at 10:12

## 2020-12-07 RX ADMIN — Medication 25 MG: at 07:12

## 2020-12-07 RX ADMIN — FENTANYL CITRATE 50 MCG: 50 INJECTION, SOLUTION INTRAMUSCULAR; INTRAVENOUS at 10:12

## 2020-12-07 RX ADMIN — GLYCOPYRROLATE 0.2 MG: 0.2 INJECTION, SOLUTION INTRAMUSCULAR; INTRAVITREAL at 07:12

## 2020-12-07 RX ADMIN — PROPOFOL 200 MG: 10 INJECTION, EMULSION INTRAVENOUS at 07:12

## 2020-12-07 RX ADMIN — OXYCODONE HYDROCHLORIDE 5 MG: 5 TABLET ORAL at 11:12

## 2020-12-07 RX ADMIN — ROCURONIUM BROMIDE 50 MG: 10 SOLUTION INTRAVENOUS at 07:12

## 2020-12-07 RX ADMIN — ONDANSETRON 4 MG: 2 INJECTION INTRAMUSCULAR; INTRAVENOUS at 01:12

## 2020-12-07 RX ADMIN — HYDROMORPHONE HYDROCHLORIDE 0.4 MG: 1 INJECTION, SOLUTION INTRAMUSCULAR; INTRAVENOUS; SUBCUTANEOUS at 05:12

## 2020-12-07 RX ADMIN — DEXAMETHASONE SODIUM PHOSPHATE 8 MG: 4 INJECTION, SOLUTION INTRAMUSCULAR; INTRAVENOUS at 07:12

## 2020-12-07 RX ADMIN — DOCUSATE SODIUM 100 MG: 100 CAPSULE, LIQUID FILLED ORAL at 08:12

## 2020-12-07 RX ADMIN — NEOSTIGMINE METHYLSULFATE 4 MG: 1 INJECTION INTRAVENOUS at 10:12

## 2020-12-07 RX ADMIN — MUPIROCIN: 20 OINTMENT TOPICAL at 06:12

## 2020-12-07 RX ADMIN — ROCURONIUM BROMIDE 10 MG: 10 SOLUTION INTRAVENOUS at 09:12

## 2020-12-07 RX ADMIN — MUPIROCIN: 20 OINTMENT TOPICAL at 08:12

## 2020-12-07 RX ADMIN — OXYCODONE HYDROCHLORIDE 5 MG: 5 TABLET ORAL at 08:12

## 2020-12-07 RX ADMIN — CYCLOBENZAPRINE HYDROCHLORIDE 10 MG: 5 TABLET, FILM COATED ORAL at 11:12

## 2020-12-07 RX ADMIN — OXYCODONE HYDROCHLORIDE 5 MG: 5 TABLET ORAL at 04:12

## 2020-12-07 RX ADMIN — SODIUM CHLORIDE, SODIUM LACTATE, POTASSIUM CHLORIDE, AND CALCIUM CHLORIDE: 600; 310; 30; 20 INJECTION, SOLUTION INTRAVENOUS at 09:12

## 2020-12-07 RX ADMIN — CELECOXIB 400 MG: 200 CAPSULE ORAL at 06:12

## 2020-12-07 RX ADMIN — CEFAZOLIN 2 G: 330 INJECTION, POWDER, FOR SOLUTION INTRAMUSCULAR; INTRAVENOUS at 07:12

## 2020-12-07 NOTE — ANESTHESIA POSTPROCEDURE EVALUATION
Anesthesia Post Evaluation    Patient: Lynne Velasco    Procedure(s) Performed: Procedure(s) (LRB):  ROBOTIC HYSTERECTOMY (N/A)  XI ROBOTIC SALPINGECTOMY (Bilateral)  CYSTOSCOPY (N/A)    [facility] Anesthesia Post Op Evaluation      Vitals Value Taken Time   /59 12/07/20 1231   Temp 36.4 °C (97.6 °F) 12/07/20 1231   Pulse 64 12/07/20 1231   Resp 18 12/07/20 1231   SpO2 100 % 12/07/20 1231         Event Time   Out of Recovery 12:10:00         Pain/Kaylah Score: Pain Rating Prior to Med Admin: 7 (12/7/2020 11:45 AM)  Pain Rating Post Med Admin: 4 (12/7/2020 12:00 PM)  Kaylah Score: 10 (12/7/2020 11:15 AM)

## 2020-12-07 NOTE — OPERATIVE NOTE ADDENDUM
Certification of Assistant at Surgery       Surgery Date: 12/7/2020     Participating Surgeons:  Surgeon(s) and Role:     * Juliane Mcdonnell MD - Primary    Procedures:  Procedure(s) (LRB):  ROBOTIC HYSTERECTOMY (N/A)  XI ROBOTIC SALPINGECTOMY (Bilateral)  CYSTOSCOPY (N/A)    Assistant Surgeon's Certification of Necessity:  I understand that section 1842 (b) (6) (d) of the Social Security Act generally prohibits Medicare Part B reasonable charge payment for the services of assistants at surgery in teaching hospitals when qualified residents are available to furnish such services. I certify that the services for which payment is claimed were medically necessary, and that no qualified resident was available to perform the services. I further understand that these services are subject to post-payment review by the Medicare carrier.      Luis Duong PA-C    12/07/2020  11:05 AM

## 2020-12-07 NOTE — OP NOTE
Title of Operation:  1)  Robotic-assisted total laparoscopic hysterectomy with bilateral salpingectomy  2)  Cystourethroscopy    Indications for Surgery:  Lynne Velasco is a 45 y.o. here for a urogyn follow up for incomplete bladder emptying.  1000+ urinary retention suddenly 2 days ago.  Has been having intermittent constipation. Occasionally feels like she is not emptying her bladder     Pelvic:  Ext. Genitalia: normal external genitalia. Normal bartholin's and skeens glands  Vagina: + (mild)  atrophy. Normal vaginal mucosa without lesions. No discharge noted.   Non-tender bladder base without palpable mass.  Cervix: no lesions  Uterus:  enlarged to 12 week's size; multiple fibriods palpable--bulky, anterior fibroid palpable to UVJ  (most of anterior vaginal wall) +TTP in LLQ  Urethra: no masses or tenderness  Urethral meatus: no lesions, caruncle or prolapse. Resistance inserting catheter     POP-Q:  Aa -1; Ba -1; C -6; Ap 0; Bp 0; D -8.  Genital hiatus 2, perineal body 2 total vaginal length 11.        Last HPI from 06/10/2019     1. Urinary urgency vs. Frequent uti  --denies SUZI  --rare UUI --wearing liner pad occasionally  --voiding every hour  --nocturia 1-2 times/ night-- limits fluids at least 2 hours prior to bedtime      2. Constipation  --still having some issues: will start fiber  --eats high fiber diet  --incomplete evacuation+     3)recurrent uti  --asymptomatic at this  2/28/2019 e. coli  11/26/2018 e. coli  11/20/2017 e. Coli     11/04/2020  Suds  Urine dipstick: neg.     1.  VOIDING PHASE:       a.  Uroflowmetry:  · Prolapse reduction: No  · Voided volume:  96 mL   · PVR:   25 mL     The overall configuration of this uroflow study was with insufficient volume for interpretation.       b.  Pressure flow:  · Prolapse reduction: No  · Voided volume:   532 mL  · Voiding time:   >5 min   · Peak flow:  12 mL/s   · Avg flow:  2 mL/s  · Max det pressure:  52  cm H20  · Det pressure at max  flow: negative reading as pves catheter was dislodged during study.   · Void initiated by detrusor contraction.    · Urethral relaxation (EMG):  absent.    · PVR (calculated):  68 mL     The overall configuration of this pressure flow study was prolonged with concern for voiding dysfunction.       2.  FILLING PHASE:  · 1st desire: 31 mL  · Normal desire:  150 mL  · Strong desire:  410 mL  · Urgency:  600 mL  · Compliance (calculated)  approx 100 mL/cm H20  · EMG activity during filling:  stable  · Detrusor contractions observed: No.      3.  URETHRAL FUNCTION/STORAGE PHASE:     a.  WITH prolapse reduction:  · CLPP (150 mL): Negative  at  94 cm H20  · VLPP (150 mL): Negative  at  84 cm H20   · CLPP (300 mL): Negative  at  98 cm H20  · VLPP (300 mL): Negative  at  98 cm H20   · CLPP (450 mL): Negative  at  97 cm H20  · VLPP (450 mL): Negative  at  76 cm H20   · CLPP (MAX ):    Negative  at  75 cm H20  · VLPP (MAX):     Negative  at  102 cm H20  · NEG CLPP and VLPP at max cap once pves catheter removed     These findings are consistent with Negative urodynamic stress incontinence.     Assessment:  UF with insufficient volume for interpretation.  PF prolonged with concern for voiding dysfunction.  Compliance normal.  Max capacity 600 mL.  DO (--).  SUZI (--).       Cysto  Findings: Urethroscopy:  Normal.  Cystoscopy:  Normal bladder mucosa--able to see impression of large anterior fibroid just beneath bladder mucosa at bladder base & cephalad to trigone, bilateral ureteral flow was noted.   Assessment: Essentially normal cystourethroscopy with evidence of large, anterior fibroid deep to bladder base.     11/09/2020  Pelvic MRI  UTERUS:   --mid position measuring 11.3 x 9.5 x 10.8 cm cm.   --Endometrial thickness measures not well visualized yet not definitively thickened.   *Fibroids   --Approximately 1 large fibroids seen   --Fibroid enhancement: Minimally heterogeneous   --Largest fibroid measures 9.2 x 7.1 x 8.1 cm  and is located RIGHT mid myometrial level.   CERVIX AND VAGINA: No cervical fibroid.  Nabothian cysts.   OVARIES: Normal.   PELVIS/LOWER ABDOMEN   LYMPH NODES: None enlarged.   URINARY BLADDER: Mildly displaced by the enlarged uterus.   VESSELS: Normal.   MUSCULOSKELETAL: No abnormal marrow signal or muscle edema.   MISCELLANEOUS: None.   Impression:   Dominant large RIGHT mid uterine fibroid measuring 9.2 x 7.1 x 8.1 cm.    Preoperative Diagnosis:  1)  Incomplete bladder emptying  2)  Uterine fibroids  3)  LLQ pain  4)  Urinary urge incontinence  5)  History UTIs  6)  Constipation  7)  Cystocele  8)  Rectocele  9)  Concern for voiding dysfunction    Postoperative Diagnosis:  1)  Incomplete bladder emptying  2)  Uterine fibroids  3)  LLQ pain  4)  Urinary urge incontinence  5)  History UTIs  6)  Constipation  7)  Cystocele  8)  Rectocele  9)  Concern for voiding dysfunction    Anesthesia:  General endotracheal anesthesia.  Additionally, 0.5% marcaine was injected at laparoscopic sites for local anesthesia.    Specimen (Bacteriological, Pathological or other):  Uterus, cervix, bilateral fallopian tubes and ovaries    Prosthetic Device/Implant:  1)  Interceed barrier.  LOT# [id number].      Surgeons Narrative:    Surgeon: Juliane Mcdonnell MD    Assistants:  RACHEAL Ignacio (insufficient resident assistance)     Intravenous Fluids:  2500 mL     Estimated Blood Loss:  50 mL     Urine Output:  60 mL     Counts:  Sponge, lap, needle counts correct x 2.     Drains: Gomez catheter.     Disposition:  The patient was sent to the PACU in stable condition.     Findings:     1.  On exam under anesthesia,  normal external female genitalia. There was not major prolapse. Uterus and cervix: Enlarged to 12 weeks' size.  Adnexa: normal bimanual exam.    2.  On laparoscopic survey:    --The bowel was grossly Normal.    --The appendix was not visualized.   --The uterus and cervix were present and enlarged to at least 12-14 weeks' size with a  large fibroid taking up most of the fundus, bilateral fallopian tubes and ovaries were grossly  Normal.   --The liver margin Normal.   --The gall bladder was present and Normal.   --Bilateral ureters were visualized and noted to vermiculate at the start and close of the case.    --Adhesions were absent.    --Other findings included:  None.      3.  On cystoscopy, the bladder mucosa was Normal.  After RATLH/BS, there was no suture or mesh within the bladder mucosa.  The ureteral orifices were visualized bilaterally with (+) noted good efflux x 2. On a systematic survey of the bladder dome to the base of the urethrovesical junction, there were not other abnormalities noted. The urethra was normal on retraction of the scope.     4.  Rectal exam:  At the close of the case, rectal exam was performed.  There was no suture, mesh, or other injury noted on exam.  No obvious masses were palpated.     Description of procedure:    The patient was identified in the preoperative area where informed consent was confirmed, and she was taken to the operating room where an adequate level of general anesthesia was obtained.  The patient was positioned in lithotomy position with legs in Eros stirrups. Care was taken to avoid joint hyperflexion or hyperextension, and all extremity surfaces were carefully padded so as to minimize risk of neurologic injury. Intravenous antibiotics were administered preoperatively. Sequential compression devices were applied to the patient's lower extremities preoperatively and heparin was administered subcutaneously for VTE prophylaxis.  Surgical time-out was performed, where the patient was identified and procedures confirmed.  An examination under anesthesia was performed with findings described as above.  The patient's abdomen, perineum, and vagina were sterilely prepped and draped. A kowalski catheter was placed in the bladder for drainage.      First, we placed laparoscopic ports. Before placement of  each laparoscopic port, several mL of 0.5% Marcaine were injected subcutaneously as well as deeply into the tissue of each site.  First, a supraumbilical incision of 5-mm was made, and the 5 mm trocar was inserted into the incision until peritoneal entry was gained and confirmed. Carbon dioxide was insufflated through the port until an adequate pneumoperitoneum of no greater than 15 mm Hg was obtained.  The laparoscopic camera was inserted through this port for visualization of all subsequent port placement.  Two 8 mm ports were place on bilaterally to the supraumbilical port, each approximately 8-10 cm lateral, along the mid clavicular line at the level of the umbilicus, at least 2 cm cephalad and medial of the anterior superior iliac spine. Each 8 mm trocar was inserted under direct visualization without significant trauma.   An 8 mm airseal port was placed in the right upper quadrant, superior to the umbilicus and midway between the right lower quadrant port and the umbilicus in a triangulated fashion. A third 8 mm port was placed at the left side, midway between the left lower quadrant port and the umbilicus.  The 5 mm umbilical camera port was then exchanged for a 8 mm robotic camera port.  There were no complications with these port placements. A total of 5 ports had been placed, including the supraumbilical port for the camera. The robot was then docked.      We began the case with total laparoscopic hysterectomy with bilateral salpingectomy.  The ureters were visualized bilaterally.  Decision had previously been made to remove bilateral tubes.  Extra time was needed to retract properly and visualize structures around the enlarged uterine fundus, especially regarding securing the uterine vessels bilaterally and dissecting the bladder off the lower uterine segment and anterior vagina.  Sequentially, the right mesosalpinx, round, and broad ligaments were electrodessicated with bipolar cautery.  The anterior and  posterior sheaths of the round ligament were gently , the uterine vessels were exposed, and electrodessicated.  Using sharp dissection, the vesicouterine fold was created, and the bladder was carefully dissected off the anterior vagina.   Next, these same steps were repeated along the patient's left side.  Excellent hemostasis was noted.  Using the monopolar brandyn, the anterior colpotomy was created along the KOH device and considered circumferentially until the entire uterine specimen was freed from the pelvis.  The specimen was then handed to pathology for further evaluation.  The vaginal cuff was closed with several figure-of-eight stitches of 0-vicryl.  Excellent hemostasis was noted. Laparoscopic equipment was placed on standby, and attention was turned vaginally for cystourethroscopy.  Findings were normal as above noted. At this point, good apical, anterior, and posterior support was noted, and no further repairs were needed.     At this point, the robotic/laparoscopic portion of the procedure was completed. The pelvis was irrigated, and all irrigants were removed. Interceed was placed along all suture lines and planes of dissection to discourage future adhesion formation. All abdominal incisions were <1 cm, and fascial closure was unnecessary. The abdomen was deflated and all laparoscopic sleeves and other instruments were removed from the abdomen. All laparoscopic skin incisions were closed with subcuticular stitches of 4-0 Monocryl and secured with steri-strips and band-aids.  Excellent cosmesis and hemostasis was noted.          At the close of the case, all counts were correct x2.  The vagina was irrigated, and all irrigants were removed.  The Gomez was in place and draining well.  The patient was awakened from general endotracheal anesthesia and was taken to the Recovery Room in stable condition.  She tolerated the procedure well.    I was present and scrubbed for the entire procedure.

## 2020-12-07 NOTE — TRANSFER OF CARE
"Anesthesia Transfer of Care Note    Patient: Lynne Velasco    Procedure(s) Performed: Procedure(s) (LRB):  ROBOTIC HYSTERECTOMY (N/A)  XI ROBOTIC SALPINGECTOMY (Bilateral)  CYSTOSCOPY (N/A)    Patient location: PACU    Anesthesia Type: general    Transport from OR: Transported from OR on 2-3 L/min O2 by NC with adequate spontaneous ventilation    Post pain: adequate analgesia    Post assessment: no apparent anesthetic complications    Post vital signs: stable    Level of consciousness: awake and alert    Nausea/Vomiting: no nausea/vomiting    Complications: none    Transfer of care protocol was followed      Last vitals:   Visit Vitals  /65   Pulse (!) 59   Temp 36.1 °C (97 °F) (Skin)   Resp 16   Ht 5' 7" (1.702 m)   Wt 63.5 kg (140 lb)   SpO2 99%   Breastfeeding No   BMI 21.93 kg/m²     "

## 2020-12-07 NOTE — ANESTHESIA POSTPROCEDURE EVALUATION
Anesthesia Post Evaluation    Patient: Lynne Velasco    Procedure(s) Performed: Procedure(s) (LRB):  ROBOTIC HYSTERECTOMY (N/A)  XI ROBOTIC SALPINGECTOMY (Bilateral)  CYSTOSCOPY (N/A)    Final Anesthesia Type: general    Patient location during evaluation: PACU  Patient participation: Yes- Able to Participate  Level of consciousness: awake and alert and oriented  Post-procedure vital signs: reviewed and stable  Pain management: adequate  Airway patency: patent    PONV status at discharge: No PONV  Anesthetic complications: no      Cardiovascular status: blood pressure returned to baseline and hemodynamically stable  Respiratory status: unassisted, spontaneous ventilation and room air  Hydration status: euvolemic  Follow-up not needed.          Vitals Value Taken Time   /59 12/07/20 1231   Temp 36.4 °C (97.6 °F) 12/07/20 1231   Pulse 64 12/07/20 1231   Resp 18 12/07/20 1231   SpO2 100 % 12/07/20 1231         Event Time   Out of Recovery 12:10:00         Pain/Kaylah Score: Pain Rating Prior to Med Admin: 7 (12/7/2020 11:45 AM)  Pain Rating Post Med Admin: 4 (12/7/2020 12:00 PM)  Kaylah Score: 10 (12/7/2020 11:15 AM)

## 2020-12-07 NOTE — INTERVAL H&P NOTE
The patient has been examined and the H&P has been reviewed:    I concur with the findings and no changes have occurred since H&P was written.    Surgery risks, benefits and alternative options discussed and understood by patient/family.          Active Hospital Problems    Diagnosis  POA    Fibroid uterus [D25.9]  Yes      Resolved Hospital Problems   No resolved problems to display.

## 2020-12-08 VITALS
OXYGEN SATURATION: 98 % | SYSTOLIC BLOOD PRESSURE: 93 MMHG | RESPIRATION RATE: 17 BRPM | BODY MASS INDEX: 22.84 KG/M2 | WEIGHT: 145.5 LBS | DIASTOLIC BLOOD PRESSURE: 61 MMHG | HEART RATE: 64 BPM | HEIGHT: 67 IN | TEMPERATURE: 98 F

## 2020-12-08 LAB
BASOPHILS # BLD AUTO: 0.04 K/UL (ref 0–0.2)
BASOPHILS NFR BLD: 0.3 % (ref 0–1.9)
DIFFERENTIAL METHOD: ABNORMAL
EOSINOPHIL # BLD AUTO: 0 K/UL (ref 0–0.5)
EOSINOPHIL NFR BLD: 0.1 % (ref 0–8)
ERYTHROCYTE [DISTWIDTH] IN BLOOD BY AUTOMATED COUNT: 12.2 % (ref 11.5–14.5)
HCT VFR BLD AUTO: 35.2 % (ref 37–48.5)
HGB BLD-MCNC: 11.4 G/DL (ref 12–16)
IMM GRANULOCYTES # BLD AUTO: 0.05 K/UL (ref 0–0.04)
IMM GRANULOCYTES NFR BLD AUTO: 0.3 % (ref 0–0.5)
LYMPHOCYTES # BLD AUTO: 1.8 K/UL (ref 1–4.8)
LYMPHOCYTES NFR BLD: 12.1 % (ref 18–48)
MCH RBC QN AUTO: 28.9 PG (ref 27–31)
MCHC RBC AUTO-ENTMCNC: 32.4 G/DL (ref 32–36)
MCV RBC AUTO: 89 FL (ref 82–98)
MONOCYTES # BLD AUTO: 1.1 K/UL (ref 0.3–1)
MONOCYTES NFR BLD: 7.4 % (ref 4–15)
NEUTROPHILS # BLD AUTO: 11.7 K/UL (ref 1.8–7.7)
NEUTROPHILS NFR BLD: 79.8 % (ref 38–73)
NRBC BLD-RTO: 0 /100 WBC
PLATELET # BLD AUTO: 352 K/UL (ref 150–350)
PMV BLD AUTO: 9 FL (ref 9.2–12.9)
RBC # BLD AUTO: 3.95 M/UL (ref 4–5.4)
WBC # BLD AUTO: 14.66 K/UL (ref 3.9–12.7)

## 2020-12-08 PROCEDURE — 36415 COLL VENOUS BLD VENIPUNCTURE: CPT

## 2020-12-08 PROCEDURE — 63600175 PHARM REV CODE 636 W HCPCS: Performed by: STUDENT IN AN ORGANIZED HEALTH CARE EDUCATION/TRAINING PROGRAM

## 2020-12-08 PROCEDURE — 85025 COMPLETE CBC W/AUTO DIFF WBC: CPT

## 2020-12-08 PROCEDURE — 25000003 PHARM REV CODE 250: Performed by: STUDENT IN AN ORGANIZED HEALTH CARE EDUCATION/TRAINING PROGRAM

## 2020-12-08 PROCEDURE — 94761 N-INVAS EAR/PLS OXIMETRY MLT: CPT

## 2020-12-08 PROCEDURE — 94799 UNLISTED PULMONARY SVC/PX: CPT

## 2020-12-08 PROCEDURE — 25000003 PHARM REV CODE 250: Performed by: OBSTETRICS & GYNECOLOGY

## 2020-12-08 RX ORDER — DOCUSATE SODIUM 100 MG/1
100 CAPSULE, LIQUID FILLED ORAL 2 TIMES DAILY PRN
Qty: 60 CAPSULE | Refills: 0 | Status: SHIPPED | OUTPATIENT
Start: 2020-12-08 | End: 2021-01-27

## 2020-12-08 RX ORDER — IBUPROFEN 600 MG/1
600 TABLET ORAL EVERY 6 HOURS
Status: DISCONTINUED | OUTPATIENT
Start: 2020-12-08 | End: 2020-12-08 | Stop reason: HOSPADM

## 2020-12-08 RX ORDER — NITROFURANTOIN 25; 75 MG/1; MG/1
100 CAPSULE ORAL DAILY
Qty: 7 CAPSULE | Refills: 0 | Status: SHIPPED | OUTPATIENT
Start: 2020-12-08 | End: 2020-12-15 | Stop reason: SDUPTHER

## 2020-12-08 RX ORDER — PHENAZOPYRIDINE HYDROCHLORIDE 100 MG/1
200 TABLET, FILM COATED ORAL
Status: DISCONTINUED | OUTPATIENT
Start: 2020-12-08 | End: 2020-12-08 | Stop reason: HOSPADM

## 2020-12-08 RX ORDER — ACETAMINOPHEN 325 MG/1
650 TABLET ORAL EVERY 6 HOURS
Status: DISCONTINUED | OUTPATIENT
Start: 2020-12-08 | End: 2020-12-08 | Stop reason: HOSPADM

## 2020-12-08 RX ORDER — PHENAZOPYRIDINE HYDROCHLORIDE 200 MG/1
200 TABLET, FILM COATED ORAL 3 TIMES DAILY PRN
Qty: 10 TABLET | Refills: 0 | Status: SHIPPED | OUTPATIENT
Start: 2020-12-08 | End: 2021-01-27 | Stop reason: ALTCHOICE

## 2020-12-08 RX ORDER — KETOROLAC TROMETHAMINE 30 MG/ML
15 INJECTION, SOLUTION INTRAMUSCULAR; INTRAVENOUS ONCE
Status: COMPLETED | OUTPATIENT
Start: 2020-12-08 | End: 2020-12-08

## 2020-12-08 RX ORDER — OXYCODONE AND ACETAMINOPHEN 5; 325 MG/1; MG/1
1 TABLET ORAL EVERY 4 HOURS PRN
Qty: 30 TABLET | Refills: 0 | Status: SHIPPED | OUTPATIENT
Start: 2020-12-08 | End: 2021-01-27 | Stop reason: ALTCHOICE

## 2020-12-08 RX ORDER — IBUPROFEN 600 MG/1
600 TABLET ORAL EVERY 6 HOURS PRN
Qty: 30 TABLET | Refills: 0 | Status: SHIPPED | OUTPATIENT
Start: 2020-12-08 | End: 2020-12-31 | Stop reason: SDUPTHER

## 2020-12-08 RX ORDER — ESCITALOPRAM OXALATE 10 MG/1
10 TABLET ORAL DAILY
Qty: 30 TABLET | Refills: 3 | Status: SHIPPED | OUTPATIENT
Start: 2020-12-08 | End: 2021-01-27 | Stop reason: SDUPTHER

## 2020-12-08 RX ADMIN — ACETAMINOPHEN 650 MG: 325 TABLET, FILM COATED ORAL at 09:12

## 2020-12-08 RX ADMIN — OXYCODONE HYDROCHLORIDE 5 MG: 5 TABLET ORAL at 05:12

## 2020-12-08 RX ADMIN — ACETAMINOPHEN 650 MG: 325 TABLET, FILM COATED ORAL at 11:12

## 2020-12-08 RX ADMIN — IBUPROFEN 600 MG: 600 TABLET, FILM COATED ORAL at 05:12

## 2020-12-08 RX ADMIN — OXYCODONE HYDROCHLORIDE 5 MG: 5 TABLET ORAL at 09:12

## 2020-12-08 RX ADMIN — PHENAZOPYRIDINE 200 MG: 100 TABLET ORAL at 11:12

## 2020-12-08 RX ADMIN — KETOROLAC TROMETHAMINE 15 MG: 30 INJECTION, SOLUTION INTRAMUSCULAR at 09:12

## 2020-12-08 RX ADMIN — MUPIROCIN: 20 OINTMENT TOPICAL at 09:12

## 2020-12-08 RX ADMIN — DOCUSATE SODIUM 100 MG: 100 CAPSULE, LIQUID FILLED ORAL at 09:12

## 2020-12-08 RX ADMIN — IBUPROFEN 600 MG: 600 TABLET, FILM COATED ORAL at 12:12

## 2020-12-08 RX ADMIN — OXYCODONE HYDROCHLORIDE 5 MG: 5 TABLET ORAL at 12:12

## 2020-12-08 RX ADMIN — ESCITALOPRAM OXALATE 10 MG: 10 TABLET ORAL at 09:12

## 2020-12-08 RX ADMIN — OXYCODONE HYDROCHLORIDE 5 MG: 5 TABLET ORAL at 01:12

## 2020-12-08 NOTE — PLAN OF CARE
Chart reviewed  Pain control noted  Case mgt to follow as needed  D/c tonight per team       12/08/20 5468   Final Note   Assessment Type Final Discharge Note   Anticipated Discharge Disposition Home   Hospital Follow Up  Appt(s) scheduled? Yes   Discharge plans and expectations educations in teach back method with documentation complete? Yes   Right Care Referral Info   Post Acute Recommendation No Care   Post-Acute Status   Discharge Delays None known at this time

## 2020-12-08 NOTE — DISCHARGE SUMMARY
[facility]  Obstetrics & Gynecology  Discharge Summary    Patient Name: Lynne Velasco  MRN: [id number]  Admission Date: 12/7/2020  Hospital Length of Stay: 0 days  Discharge Date and Time: 12/8/2020  5:54 PM  Attending Physician: No att. providers found   Discharging Provider: Marvin Mayo MD  Primary Care Provider: Jf Kuo MD    HPI: 46 yo female who presented for planned surgical management of symptomatic uterine fibroids.    Hospital Course: Pt underwent surgical procedures as listed below. Patient tolerated the procedure well without complications, please see Op note for further details. Postoperative course was uncomplicated, and patient made routine advances as anticipated. On POD#1, patient is meeting all postoperative milestones and is ready for discharge. Pain is well-controlled with PO pain medications. Patient is tolerating PO without nausea or vomiting, ambulating, and passing flatus. She failed her voiding trial, thus kowalski catheter was replaced and appt was made for repeat voiding trial later this week. Patient instructed to f/u in 6 weeks for post op visit.      Procedure(s) (LRB):  ROBOTIC HYSTERECTOMY (N/A)  XI ROBOTIC SALPINGECTOMY (Bilateral)  CYSTOSCOPY (N/A)     Consults:     Significant Diagnostic Studies: Labs:   CBC   Recent Labs   Lab 12/08/20  0329   WBC 14.66*   HGB 11.4*   HCT 35.2*   *       Pending Diagnostic Studies:     Procedure Component Value Units Date/Time    Specimen to Pathology, Surgery Gynecology and Obstetrics [[id number]] Collected: 12/07/20 1058    Order Status: Sent Lab Status: In process Updated: 12/07/20 1409        Final Active Diagnoses:    Diagnosis Date Noted POA    Uterine leiomyoma [D25.9] 12/07/2020 Yes      Problems Resolved During this Admission:    Diagnosis Date Noted Date Resolved POA    Rectocele [N81.6] 12/07/2020 12/07/2020 Yes    Midline cystocele [N81.11] 12/07/2020 12/07/2020 Yes        Discharged  Condition: good    Disposition: Home or Self Care    Follow Up:  Follow-up Information     [id number] . Go on 12/11/2020.    Specialty: Urogynecology  Why: 10 am, voiding trial  Contact information:  [address]  [facility] [address] [phone]  Additional information:  Urogynecology - [facility], 4th Floor    Please park in [address] and use [facility] elevators           Juliane Mcdonnell MD. Schedule an appointment as soon as possible for a visit in 6 weeks.    Specialties: Gynecology, Urology  Why: Post Op Check  Contact information:  [address]  [facility] [address]  [phone]             Go to Jf Kuo MD.    Specialty: Family Medicine  Contact information:  [address]  [facility] [id number] [phone]                 Patient Instructions:      Diet Adult Regular     Lifting restrictions   Order Comments: Do not lift anything heavier than 10 pounds for 4-6 weeks     Pelvic Rest   Order Comments: Nothing in the vagina until cleared at your post op visit     Notify your health care provider if you experience any of the following:  temperature >100.4     Notify your health care provider if you experience any of the following:  persistent nausea and vomiting or diarrhea     Notify your health care provider if you experience any of the following:  severe uncontrolled pain     Notify your health care provider if you experience any of the following:  redness, tenderness, or signs of infection (pain, swelling, redness, odor or green/yellow discharge around incision site)     Notify your health care provider if you experience any of the following:  increased confusion or weakness     Notify your health care provider if you experience any of the following:  persistent dizziness, light-headedness, or visual disturbances     Notify your health care provider if you experience any of the following:  worsening rash      Notify your health care provider if you experience any of the following:  severe persistent headache     Notify your health care provider if you experience any of the following:  difficulty breathing or increased cough     Notify your health care provider if you experience any of the following:   Order Comments: Heavy vaginal bleeding. Light spotting is normal.     No dressing needed     Activity as tolerated     Medications:  Reconciled Home Medications:      Medication List      START taking these medications    ibuprofen 600 MG tablet  Commonly known as: ADVIL,MOTRIN  Take 1 tablet (600 mg total) by mouth every 6 (six) hours as needed.     nitrofurantoin (macrocrystal-monohydrate) 100 MG capsule  Commonly known as: MACROBID  Take 1 capsule (100 mg total) by mouth once daily. Stop taking once kowalski is removed.     oxyCODONE-acetaminophen 5-325 mg per tablet  Commonly known as: PERCOCET  Take 1 tablet by mouth every 4 (four) hours as needed for Pain.     phenazopyridine 200 MG tablet  Commonly known as: PYRIDIUM  Take 1 tablet (200 mg total) by mouth 3 (three) times daily as needed for Pain (bladder spasms).     STOOL SOFTENER 100 MG capsule  Generic drug: docusate sodium  Take 1 capsule (100 mg total) by mouth 2 (two) times daily as needed for Constipation.        CHANGE how you take these medications    * escitalopram oxalate 10 MG tablet  Commonly known as: LEXAPRO  escitalopram 10 mg tablet   TK 2 TS PO ONCE DAILY  What changed: Another medication with the same name was added. Make sure you understand how and when to take each.     * escitalopram oxalate 10 MG tablet  Commonly known as: LEXAPRO  Take 1 tablet (10 mg total) by mouth once daily.  What changed: You were already taking a medication with the same name, and this prescription was added. Make sure you understand how and when to take each.         * This list has 2 medication(s) that are the same as other medications prescribed for you. Read the  directions carefully, and ask your doctor or other care provider to review them with you.            CONTINUE taking these medications    amitriptyline 25 MG tablet  Commonly known as: ELAVIL  Take 1 tablet (25 mg total) by mouth nightly as needed for Insomnia (Headache and insomnia).     finasteride 5 mg tablet  Commonly known as: PROSCAR  TK 1 T PO QD     LORazepam 0.5 MG tablet  Commonly known as: ATIVAN  lorazepam 0.5 mg tablet     melatonin 10 mg Cap  Take by mouth.     spironolactone 50 MG tablet  Commonly known as: ALDACTONE  TK 1 T PO QD FOR 2 WEEKS THEN 2 TS QD FOR 2 WEEKS THEN 3 TS QD THEREAFTER AS TOLERATED        STOP taking these medications    sumatriptan 100 MG tablet  Commonly known as: IMITREX     zolpidem 5 MG Tab  Commonly known as: GABRIELLA Mayo MD  Obstetrics & Gynecology  [facility]

## 2020-12-08 NOTE — PLAN OF CARE
12/08/20 1732   Discharge Assessment   Assessment Type Discharge Planning Assessment   Confirmed/corrected address and phone number on facesheet? Yes   Assessment information obtained from? Patient;Caregiver;Medical Record   Communicated expected length of stay with patient/caregiver yes   Prior to hospitilization cognitive status: Alert/Oriented   Prior to hospitalization functional status: Independent   Current cognitive status: Alert/Oriented   Current Functional Status: Independent   Lives With spouse   Discharge Plan A Home with family   Patient/Family in Agreement with Plan yes

## 2020-12-08 NOTE — PLAN OF CARE
VSS and afebrile, aaox4. Lap sites CDI, peripad in place. Gomez to gravity draining clear yellow urine. Pain controlled with PRN medication. Tolerating ordered diet. Plan of care reviewed with patient. Purposeful hourly rounding done. Call light at bedside, bed at lowest position, brakes on, non skid socks on. Will continue to monitor.       Resident notified of asymptomatic hypotension and pt request for pain medication. Ok given to administer oxy 5mg. No new orders, continue to monitor.

## 2020-12-08 NOTE — PLAN OF CARE
Chart reviewed  Pain control noted  Case mgt to follow as needed  Unable to assess today        12/08/20 9429   Discharge Assessment   Assessment Type Discharge Planning Assessment   Confirmed/corrected address and phone number on facesheet? No   Assessment information obtained from? Caregiver;Medical Record   Patient/Family in Agreement with Plan unable to assess

## 2020-12-08 NOTE — PROGRESS NOTES
[facility]  Obstetrics & Gynecology  Progress Note    Patient Name: yLnne Velasco  MRN: [id number]  Admission Date: 12/7/2020  Primary Care Provider: Jf Kuo MD  Principal Problem: <principal problem not specified>    Subjective:     Interval History: POD#1 s/p RATLH/BS with cystoscopy. Pt doing okay this morning. Reports moderate diffuse abdominal and shoulder pain. Does not feel improvement with current pain regimen. Tolerating regular diet without nausea/vomiting. Passing flatus. No BM. Not yet ambulating or voiding. Gomez still in place.    Scheduled Meds:   acetaminophen  650 mg Oral Q6H    docusate sodium  100 mg Oral BID    escitalopram oxalate  10 mg Oral Daily    ibuprofen  600 mg Oral Q6H    melatonin  9 mg Oral Nightly    mupirocin   Nasal BID    phenazopyridine  200 mg Oral TID WM     Continuous Infusions:   sodium chloride 0.9% 125 mL/hr (12/07/20 1300)     PRN Meds:amitriptyline, HYDROmorphone, LORazepam, metoclopramide HCl, naloxone, ondansetron, oxyCODONE    Review of patient's allergies indicates:   Allergen Reactions    Wool Hives    Effexor [venlafaxine] Palpitations       Objective:     Vital Signs (Most Recent):  Temp: 97.4 °F (36.3 °C) (12/08/20 0748)  Pulse: 63 (12/08/20 0748)  Resp: 16 (12/08/20 0900)  BP: (!) 99/57 (12/08/20 0748)  SpO2: 99 % (12/08/20 0748) Vital Signs (24h Range):  Temp:  [97.4 °F (36.3 °C)-98.4 °F (36.9 °C)] 97.4 °F (36.3 °C)  Pulse:  [] 63  Resp:  [16-20] 16  SpO2:  [95 %-100 %] 99 %  BP: ()/(53-64) 99/57     Weight: 66 kg (145 lb 8.1 oz)  Body mass index is 22.79 kg/m².  No LMP recorded. Patient has had an ablation.    I&O (Last 24H):    Intake/Output Summary (Last 24 hours) at 12/8/2020 0946  Last data filed at 12/8/2020 0500  Gross per 24 hour   Intake 2932.92 ml   Output 1810 ml   Net 1122.92 ml       Physical Exam:   Constitutional: She is oriented to person, place, and time. She appears  well-developed and well-nourished. No distress.    HENT:   Head: Normocephalic and atraumatic.      Cardiovascular: Normal rate, regular rhythm and normal heart sounds.     Pulmonary/Chest: Effort normal and breath sounds normal. No respiratory distress. She has no wheezes.        Abdominal: Soft. Bowel sounds are normal. She exhibits abdominal incision (c/d/i). She exhibits no distension. There is abdominal tenderness (mild). There is no rebound and no guarding.     Genitourinary:    Genitourinary Comments: No blood on pad, kowalski in place draining clear/yellow urine             Musculoskeletal: Moves all extremeties. No tenderness or edema.      Comments: SCDs on and functioning       Neurological: She is alert and oriented to person, place, and time.    Skin: Skin is warm and dry. She is not diaphoretic.    Psychiatric: She has a normal mood and affect. Her behavior is normal. Judgment and thought content normal.       Laboratory:  CBC:   Recent Labs   Lab 12/08/20  0329   WBC 14.66*   RBC 3.95*   HGB 11.4*   HCT 35.2*   *   MCV 89   MCH 28.9   MCHC 32.4       Diagnostic Results:  n/a    Assessment/Plan:     Active Diagnoses:    Diagnosis Date Noted POA    Uterine leiomyoma [D25.9] 12/07/2020 Yes      Problems Resolved During this Admission:    Diagnosis Date Noted Date Resolved POA    Rectocele [N81.6] 12/07/2020 12/07/2020 Yes    Midline cystocele [N81.11] 12/07/2020 12/07/2020 Yes     1. Routine post-op care  --scheduled ibuprofen, PRN oxy IR. Will add scheduled tylenol and give 1 dose of IV toradol.  --add pyridium for bladder spasms  --chloroseptic spray & lozenges for sore throat  --regular diet as tolerated  --dc maint fluids as tolerating diet.  --PRNs: simethicone, benadryl, zofran, phenergan  --CBC stable and reassuring  --encourage ambulation, IS, SCDs  --active void trial this morning  --lovenox not indicated    2. Anxiety  --continue home lexapro  --ativan PRN    3. Insomnia  --continue home  melatonin  --ambien held inpatient    DISPO: anticipate discharge home today after voiding trial once pain adequately controlled.    Marvin Mayo MD  Obstetrics & Gynecology  [facility]

## 2020-12-10 ENCOUNTER — PATIENT MESSAGE (OUTPATIENT)
Dept: UROGYNECOLOGY | Facility: CLINIC | Age: 45
End: 2020-12-10

## 2020-12-10 LAB
FINAL PATHOLOGIC DIAGNOSIS: NORMAL
GROSS: NORMAL
Lab: NORMAL

## 2020-12-11 ENCOUNTER — OFFICE VISIT (OUTPATIENT)
Dept: UROGYNECOLOGY | Facility: CLINIC | Age: 45
End: 2020-12-11
Payer: COMMERCIAL

## 2020-12-11 VITALS
SYSTOLIC BLOOD PRESSURE: 100 MMHG | BODY MASS INDEX: 22.7 KG/M2 | HEIGHT: 67 IN | DIASTOLIC BLOOD PRESSURE: 60 MMHG | WEIGHT: 144.63 LBS

## 2020-12-11 DIAGNOSIS — R33.9 INCOMPLETE BLADDER EMPTYING: ICD-10-CM

## 2020-12-11 DIAGNOSIS — Z98.890 POST-OPERATIVE STATE: Primary | ICD-10-CM

## 2020-12-11 PROCEDURE — 1125F PR PAIN SEVERITY QUANTIFIED, PAIN PRESENT: ICD-10-PCS | Mod: S$GLB,,, | Performed by: NURSE PRACTITIONER

## 2020-12-11 PROCEDURE — 1125F AMNT PAIN NOTED PAIN PRSNT: CPT | Mod: S$GLB,,, | Performed by: NURSE PRACTITIONER

## 2020-12-11 PROCEDURE — 99999 PR PBB SHADOW E&M-EST. PATIENT-LVL III: ICD-10-PCS | Mod: PBBFAC,,, | Performed by: NURSE PRACTITIONER

## 2020-12-11 PROCEDURE — 87181 SC STD AGAR DILUTION PER AGT: CPT

## 2020-12-11 PROCEDURE — 99999 PR PBB SHADOW E&M-EST. PATIENT-LVL III: CPT | Mod: PBBFAC,,, | Performed by: NURSE PRACTITIONER

## 2020-12-11 PROCEDURE — 87086 URINE CULTURE/COLONY COUNT: CPT

## 2020-12-11 PROCEDURE — 87186 SC STD MICRODIL/AGAR DIL: CPT

## 2020-12-11 PROCEDURE — 99024 PR POST-OP FOLLOW-UP VISIT: ICD-10-PCS | Mod: S$GLB,,, | Performed by: NURSE PRACTITIONER

## 2020-12-11 PROCEDURE — 87077 CULTURE AEROBIC IDENTIFY: CPT

## 2020-12-11 PROCEDURE — 99024 POSTOP FOLLOW-UP VISIT: CPT | Mod: S$GLB,,, | Performed by: NURSE PRACTITIONER

## 2020-12-11 PROCEDURE — 3008F PR BODY MASS INDEX (BMI) DOCUMENTED: ICD-10-PCS | Mod: CPTII,S$GLB,, | Performed by: NURSE PRACTITIONER

## 2020-12-11 PROCEDURE — 3008F BODY MASS INDEX DOCD: CPT | Mod: CPTII,S$GLB,, | Performed by: NURSE PRACTITIONER

## 2020-12-11 PROCEDURE — 87088 URINE BACTERIA CULTURE: CPT

## 2020-12-11 NOTE — PROGRESS NOTES
The patient was placed in dorsal lithotomy position with feet in stirrups.    The bladder was filled with 300 mL sterile water to gravity with a 60 mL kowalski tipped syringe, at which point she voiced a strong desire to void.  The catheter was removed. She voided 275 mL.  She tolerated the procedure well.    NADER Figueroa-BC

## 2020-12-14 ENCOUNTER — TELEPHONE (OUTPATIENT)
Dept: UROGYNECOLOGY | Facility: CLINIC | Age: 45
End: 2020-12-14

## 2020-12-14 ENCOUNTER — PATIENT MESSAGE (OUTPATIENT)
Dept: UROGYNECOLOGY | Facility: CLINIC | Age: 45
End: 2020-12-14

## 2020-12-14 NOTE — TELEPHONE ENCOUNTER
Attempted to reach patient regarding post op pain. VM left to call office. MO message sent.  NADER Figueroa-BC

## 2020-12-15 ENCOUNTER — PATIENT MESSAGE (OUTPATIENT)
Dept: UROGYNECOLOGY | Facility: CLINIC | Age: 45
End: 2020-12-15

## 2020-12-15 DIAGNOSIS — N30.00 ACUTE CYSTITIS WITHOUT HEMATURIA: Primary | ICD-10-CM

## 2020-12-15 LAB — BACTERIA UR CULT: ABNORMAL

## 2020-12-15 RX ORDER — NITROFURANTOIN 25; 75 MG/1; MG/1
100 CAPSULE ORAL 2 TIMES DAILY
Qty: 14 CAPSULE | Refills: 0 | Status: SHIPPED | OUTPATIENT
Start: 2020-12-15 | End: 2021-01-27

## 2020-12-30 ENCOUNTER — TELEPHONE (OUTPATIENT)
Dept: UROGYNECOLOGY | Facility: CLINIC | Age: 45
End: 2020-12-30

## 2020-12-30 NOTE — TELEPHONE ENCOUNTER
----- Message from Sailaja Stauffer sent at 12/30/2020  1:42 PM CST -----  Regarding: Medical Advice  Type:  Needs Medical Advice    Who Called:  Patient need to speak with nurse   Patient states experiencing pain from groin area going down to leg   Patient need to be advised  Symptoms (please be specific)  How long has patient had these symptoms:      Would the patient rather a call back or a response via [facility]? call  Best Call Back Number: [phone]  Additional Information:

## 2020-12-30 NOTE — TELEPHONE ENCOUNTER
Informed pt per NP Kojo she needed to come in for a visit. Pt voiced understanding and was scheduled for tomorrow at 9 am.

## 2020-12-31 ENCOUNTER — OFFICE VISIT (OUTPATIENT)
Dept: UROGYNECOLOGY | Facility: CLINIC | Age: 45
End: 2020-12-31
Payer: COMMERCIAL

## 2020-12-31 VITALS
HEIGHT: 67 IN | BODY MASS INDEX: 21.17 KG/M2 | SYSTOLIC BLOOD PRESSURE: 108 MMHG | WEIGHT: 134.88 LBS | DIASTOLIC BLOOD PRESSURE: 76 MMHG

## 2020-12-31 DIAGNOSIS — G89.18 POST-OP PAIN: ICD-10-CM

## 2020-12-31 DIAGNOSIS — M62.89 PELVIC FLOOR TENSION: ICD-10-CM

## 2020-12-31 DIAGNOSIS — Z98.890 POST-OPERATIVE STATE: Primary | ICD-10-CM

## 2020-12-31 PROCEDURE — 99999 PR PBB SHADOW E&M-EST. PATIENT-LVL III: ICD-10-PCS | Mod: PBBFAC,,, | Performed by: NURSE PRACTITIONER

## 2020-12-31 PROCEDURE — 1125F PR PAIN SEVERITY QUANTIFIED, PAIN PRESENT: ICD-10-PCS | Mod: S$GLB,,, | Performed by: NURSE PRACTITIONER

## 2020-12-31 PROCEDURE — 99024 POSTOP FOLLOW-UP VISIT: CPT | Mod: S$GLB,,, | Performed by: NURSE PRACTITIONER

## 2020-12-31 PROCEDURE — 99999 PR PBB SHADOW E&M-EST. PATIENT-LVL III: CPT | Mod: PBBFAC,,, | Performed by: NURSE PRACTITIONER

## 2020-12-31 PROCEDURE — 3008F PR BODY MASS INDEX (BMI) DOCUMENTED: ICD-10-PCS | Mod: CPTII,S$GLB,, | Performed by: NURSE PRACTITIONER

## 2020-12-31 PROCEDURE — 99024 PR POST-OP FOLLOW-UP VISIT: ICD-10-PCS | Mod: S$GLB,,, | Performed by: NURSE PRACTITIONER

## 2020-12-31 PROCEDURE — 1125F AMNT PAIN NOTED PAIN PRSNT: CPT | Mod: S$GLB,,, | Performed by: NURSE PRACTITIONER

## 2020-12-31 PROCEDURE — 3008F BODY MASS INDEX DOCD: CPT | Mod: CPTII,S$GLB,, | Performed by: NURSE PRACTITIONER

## 2020-12-31 RX ORDER — IBUPROFEN 600 MG/1
600 TABLET ORAL EVERY 6 HOURS PRN
Qty: 30 TABLET | Refills: 1 | Status: SHIPPED | OUTPATIENT
Start: 2020-12-31 | End: 2021-01-27

## 2020-12-31 NOTE — PROGRESS NOTES
[facility] UROGYNECOLOGY-[id number]   [address] [id number]  December 31, 2020     Lynne Velasco  [id number]  1975    UROGYN POST-OP  12/31/2020     Lynne Velasco is a 45 y.o. here for a post operative visit.    OPERATIVE NOTE  12/07/2020  Title of Operation:  1)  Robotic-assisted total laparoscopic hysterectomy with bilateral salpingectomy  2)  Cystourethroscopy     Indications for Surgery:  Lynne Velasco is a 45 y.o. here for a urogyn follow up for incomplete bladder emptying.  1000+ urinary retention suddenly 2 days ago.  Has been having intermittent constipation. Occasionally feels like she is not emptying her bladder     Pelvic:  Ext. Genitalia: normal external genitalia. Normal bartholin's and skeens glands  Vagina: + (mild)  atrophy. Normal vaginal mucosa without lesions. No discharge noted.   Non-tender bladder base without palpable mass.  Cervix: no lesions  Uterus:  enlarged to 12 week's size; multiple fibriods palpable--bulky, anterior fibroid palpable to UVJ  (most of anterior vaginal wall) +TTP in LLQ  Urethra: no masses or tenderness  Urethral meatus: no lesions, caruncle or prolapse. Resistance inserting catheter     POP-Q:  Aa -1; Ba -1; C -6; Ap 0; Bp 0; D -8.  Genital hiatus 2, perineal body 2 total vaginal length 11.        Last HPI from 06/10/2019     1. Urinary urgency vs. Frequent uti  --denies SUZI  --rare UUI --wearing liner pad occasionally  --voiding every hour  --nocturia 1-2 times/ night-- limits fluids at least 2 hours prior to bedtime      2. Constipation  --still having some issues: will start fiber  --eats high fiber diet  --incomplete evacuation+     3)recurrent uti  --asymptomatic at this  2/28/2019 e. coli  11/26/2018 e. coli  11/20/2017 e. Coli     11/04/2020  Suds  Urine dipstick: neg.     1.  VOIDING PHASE:       a.  Uroflowmetry:  · Prolapse reduction: No  · Voided volume:  96 mL   · PVR:   25 mL     The  overall configuration of this uroflow study was with insufficient volume for interpretation.       b.  Pressure flow:  · Prolapse reduction: No  · Voided volume:   532 mL  · Voiding time:   >5 min   · Peak flow:  12 mL/s   · Avg flow:  2 mL/s  · Max det pressure:  52  cm H20  · Det pressure at max flow: negative reading as pves catheter was dislodged during study.   · Void initiated by detrusor contraction.    · Urethral relaxation (EMG):  absent.    · PVR (calculated):  68 mL     The overall configuration of this pressure flow study was prolonged with concern for voiding dysfunction.       2.  FILLING PHASE:  · 1st desire: 31 mL  · Normal desire:  150 mL  · Strong desire:  410 mL  · Urgency:  600 mL  · Compliance (calculated)  approx 100 mL/cm H20  · EMG activity during filling:  stable  · Detrusor contractions observed: No.      3.  URETHRAL FUNCTION/STORAGE PHASE:     a.  WITH prolapse reduction:  · CLPP (150 mL): Negative  at  94 cm H20  · VLPP (150 mL): Negative  at  84 cm H20   · CLPP (300 mL): Negative  at  98 cm H20  · VLPP (300 mL): Negative  at  98 cm H20   · CLPP (450 mL): Negative  at  97 cm H20  · VLPP (450 mL): Negative  at  76 cm H20   · CLPP (MAX ):    Negative  at  75 cm H20  · VLPP (MAX):     Negative  at  102 cm H20  · NEG CLPP and VLPP at max cap once pves catheter removed     These findings are consistent with Negative urodynamic stress incontinence.     Assessment:  UF with insufficient volume for interpretation.  PF prolonged with concern for voiding dysfunction.  Compliance normal.  Max capacity 600 mL.  DO (--).  SUZI (--).       Cysto  Findings: Urethroscopy:  Normal.  Cystoscopy:  Normal bladder mucosa--able to see impression of large anterior fibroid just beneath bladder mucosa at bladder base & cephalad to trigone, bilateral ureteral flow was noted.   Assessment: Essentially normal cystourethroscopy with evidence of large, anterior fibroid deep to bladder base.     11/09/2020  Pelvic  MRI  UTERUS:   --mid position measuring 11.3 x 9.5 x 10.8 cm cm.   --Endometrial thickness measures not well visualized yet not definitively thickened.   *Fibroids   --Approximately 1 large fibroids seen   --Fibroid enhancement: Minimally heterogeneous   --Largest fibroid measures 9.2 x 7.1 x 8.1 cm and is located RIGHT mid myometrial level.   CERVIX AND VAGINA: No cervical fibroid.  Nabothian cysts.   OVARIES: Normal.   PELVIS/LOWER ABDOMEN   LYMPH NODES: None enlarged.   URINARY BLADDER: Mildly displaced by the enlarged uterus.   VESSELS: Normal.   MUSCULOSKELETAL: No abnormal marrow signal or muscle edema.   MISCELLANEOUS: None.   Impression:   Dominant large RIGHT mid uterine fibroid measuring 9.2 x 7.1 x 8.1 cm.     Preoperative Diagnosis:  1)  Incomplete bladder emptying  2)  Uterine fibroids  3)  LLQ pain  4)  Urinary urge incontinence  5)  History UTIs  6)  Constipation  7)  Cystocele  8)  Rectocele  9)  Concern for voiding dysfunction     Postoperative Diagnosis:  1)  Incomplete bladder emptying  2)  Uterine fibroids  3)  LLQ pain  4)  Urinary urge incontinence  5)  History UTIs  6)  Constipation  7)  Cystocele  8)  Rectocele  9)  Concern for voiding dysfunction    HISTORY SINCE LAST VISIT:  Complains of dull, aching pain in R groin radiating down front of R thigh. Has not been taking ibuprofen.  Feels like leg is weak when she has increased pain.  Scant yellow/tan discharge. No vaginal bleeding or pain.  Bladder issues: Denies UI, urinary urgency or frequency.  Bowel issues: +Constipation intermittently-- taking metamucil 3 tablespoons and stool softener daily.  Drinkg 4 bottles of water.     Past Medical History:   Diagnosis Date    Abnormal Pap smear 1993    cryo for dysplasia    Anxiety     Depression     History of psychiatric care     prozac, lexapro, ativan    Mental disorder     Depression    Psychiatric problem     Therapy        Past Surgical History:   Procedure Laterality Date     AUGMENTATION OF BREAST      saline, 2010    Breast implants 1998    Cryo       dental implant      DILATION AND CURETTAGE OF UTERUS      DILATION AND CURETTAGE OF UTERUS      ENDOMETRIAL ABLATION      Hysteroscopy with Novasure endometrial ablation    EYE SURGERY      lasik    ROBOT-ASSISTED LAPAROSCOPIC HYSTERECTOMY N/A 2020    Procedure: ROBOTIC HYSTERECTOMY;  Surgeon: Juliane Mcdonnell MD;  Location: [facility];  Service: OB/GYN;  Laterality: N/A;    ROBOT-ASSISTED SURGICAL REMOVAL OF FALLOPIAN TUBE USING DA NAV XI Bilateral 2020    Procedure: XI ROBOTIC SALPINGECTOMY;  Surgeon: Juliane Mcdonnell MD;  Location: [facility];  Service: OB/GYN;  Laterality: Bilateral;       Family History   Problem Relation Age of Onset    Diabetes Father     Stroke Father     Diabetes Mother     Hypertension Mother     Coronary artery disease Mother     Depression Mother     Seizures Mother     Ovarian cancer Maternal Aunt     Breast cancer Maternal Aunt     Breast cancer Paternal Aunt     Colon cancer Neg Hx     Suicide Neg Hx        Social History     Socioeconomic History    Marital status:      Spouse name: Not on file    Number of children: Not on file    Years of education: Not on file    Highest education level: Not on file   Occupational History     Employer: [employer]   Social Needs    Financial resource strain: Not on file    Food insecurity     Worry: Not on file     Inability: Not on file    Transportation needs     Medical: Not on file     Non-medical: Not on file   Tobacco Use    Smoking status: Former Smoker     Packs/day: 0.50     Years: 5.00     Pack years: 2.50     Types: Cigarettes     Quit date: 10/15/1999     Years since quittin.2    Smokeless tobacco: Never Used   Substance and Sexual Activity    Alcohol use: Yes     Alcohol/week: 2.0 standard drinks     Types: 2 Glasses of wine per week     Comment: social    Drug use: No    Sexual activity: Yes      Partners: Male     Birth control/protection: Surgical, None     Comment: Spouse had Vasectomy,  since 1999   Lifestyle    Physical activity     Days per week: Not on file     Minutes per session: Not on file    Stress: Not on file   Relationships    Social connections     Talks on phone: Not on file     Gets together: Not on file     Attends Druze service: Not on file     Active member of club or organization: Not on file     Attends meetings of clubs or organizations: Not on file     Relationship status: Not on file   Other Topics Concern    Patient feels they ought to cut down on drinking/drug use Not Asked    Patient annoyed by others criticizing their drinking/drug use Not Asked    Patient has felt bad or guilty about drinking/drug use Not Asked    Patient has had a drink/used drugs as an eye opener in the AM Not Asked   Social History Narrative    - 14 years    From [facility]    Works for a law firm- - associates degree    3 children     close with mom       Current Outpatient Medications   Medication Sig Dispense Refill    amitriptyline (ELAVIL) 25 MG tablet Take 1 tablet (25 mg total) by mouth nightly as needed for Insomnia (Headache and insomnia). 90 tablet 3    docusate sodium (COLACE) 100 MG capsule Take 1 capsule (100 mg total) by mouth 2 (two) times daily as needed for Constipation. 60 capsule 0    escitalopram oxalate (LEXAPRO) 10 MG tablet Take 1 tablet (10 mg total) by mouth once daily. 30 tablet 3    finasteride (PROSCAR) 5 mg tablet TK 1 T PO QD      spironolactone (ALDACTONE) 50 MG tablet TK 1 T PO QD FOR 2 WEEKS THEN 2 TS QD FOR 2 WEEKS THEN 3 TS QD THEREAFTER AS TOLERATED  5    escitalopram oxalate (LEXAPRO) 10 MG tablet escitalopram 10 mg tablet   TK 2 TS PO ONCE DAILY      ibuprofen (ADVIL,MOTRIN) 600 MG tablet Take 1 tablet (600 mg total) by mouth every 6 (six) hours as needed for Pain. 30 tablet 1    LORazepam (ATIVAN) 0.5 MG tablet lorazepam 0.5 mg  "tablet      melatonin 10 mg Cap Take by mouth.      nitrofurantoin, macrocrystal-monohydrate, (MACROBID) 100 MG capsule Take 1 capsule (100 mg total) by mouth 2 (two) times daily. (Patient not taking: Reported on 12/31/2020) 14 capsule 0    oxyCODONE-acetaminophen (PERCOCET) 5-325 mg per tablet Take 1 tablet by mouth every 4 (four) hours as needed for Pain. (Patient not taking: Reported on 12/31/2020) 30 tablet 0    phenazopyridine (PYRIDIUM) 200 MG tablet Take 1 tablet (200 mg total) by mouth 3 (three) times daily as needed for Pain (bladder spasms). (Patient not taking: Reported on 12/31/2020) 10 tablet 0     No current facility-administered medications for this visit.        Review of patient's allergies indicates:   Allergen Reactions    Wool Hives    Effexor [venlafaxine] Palpitations        ROS  Per HPI    Well Woman   Pap test: 11/26/2018 normal neg hpv.  History of abnormal paps: Yes - cryo in the past.  History of STIs:  No  Mammogram: Date of last: 01/2020 normal elevated TC score --will send to breast surgery if next mammogram still shows elevated TC score    Physical Exam  /76   Ht 5' 7" (1.702 m)   Wt 61.2 kg (134 lb 14.4 oz)   BMI 21.13 kg/m²   General: alert and oriented, no acute distress  Respiratory: normal respiratory effort  Abd: soft, non-tender, non-distended  Extremities with equal strength and normal range of motion bilaterally     Pelvic:  Ext. Genitalia: normal external genitalia. Normal bartholin's and skeens glands  Vagina: -- atrophy. Normal vaginal mucosa without lesions. No discharge noted. Incisions healing well-- sutures intact.  Non-tender bladder base without palpable mass. +TTP in bilateral levator ani R>L (does mimic pain when palpated)  Cervix: absent  Uterus:  absent   Urethra: no masses or tenderness  Urethral meatus: no lesions, caruncle or prolapse.      Impression  1. Post-operative state     2. Post-op pain  ibuprofen (ADVIL,MOTRIN) 600 MG tablet   3. Pelvic " floor tension       We reviewed the above issues and discussed options for short-term versus long-term management of her problems.     Plan:   1. Post op healing well. Continue to follow post op instructions.  2. Take ibuprofen 600 mg every 6 hours as needed for pain  3. Add miralax 1/2 capful daily  4. Patient will follow up with us in 3 weeks    30 minutes were spent in face to face time with this patient  90 % of this time was spent in counseling and/or coordination of care      NADER Figueroa-BC  [facility]  Division of Female Pelvic Medicine and Reconstructive Surgery  Department of Obstetrics & Gynecology

## 2020-12-31 NOTE — PATIENT INSTRUCTIONS
1. Post op healing well. Continue to follow post op instructions.  2. Take ibuprofen 600 mg every 6 hours as needed for pain  3. Add miralax 1/2 capful daily  4. Patient will follow up with us in 3 weeks

## 2021-01-27 ENCOUNTER — OFFICE VISIT (OUTPATIENT)
Dept: UROGYNECOLOGY | Facility: CLINIC | Age: 46
End: 2021-01-27
Payer: COMMERCIAL

## 2021-01-27 VITALS
HEART RATE: 88 BPM | HEIGHT: 67 IN | DIASTOLIC BLOOD PRESSURE: 73 MMHG | SYSTOLIC BLOOD PRESSURE: 96 MMHG | WEIGHT: 132.81 LBS | BODY MASS INDEX: 20.84 KG/M2

## 2021-01-27 DIAGNOSIS — N89.8 VAGINAL DISCHARGE: ICD-10-CM

## 2021-01-27 DIAGNOSIS — M62.89 PELVIC FLOOR TENSION: ICD-10-CM

## 2021-01-27 DIAGNOSIS — Z98.890 POST-OPERATIVE STATE: Primary | ICD-10-CM

## 2021-01-27 DIAGNOSIS — R33.9 INCOMPLETE BLADDER EMPTYING: ICD-10-CM

## 2021-01-27 PROCEDURE — 99024 POSTOP FOLLOW-UP VISIT: CPT | Mod: S$GLB,,, | Performed by: NURSE PRACTITIONER

## 2021-01-27 PROCEDURE — 3008F BODY MASS INDEX DOCD: CPT | Mod: CPTII,S$GLB,, | Performed by: NURSE PRACTITIONER

## 2021-01-27 PROCEDURE — 99024 PR POST-OP FOLLOW-UP VISIT: ICD-10-PCS | Mod: S$GLB,,, | Performed by: NURSE PRACTITIONER

## 2021-01-27 PROCEDURE — 1126F PR PAIN SEVERITY QUANTIFIED, NO PAIN PRESENT: ICD-10-PCS | Mod: S$GLB,,, | Performed by: NURSE PRACTITIONER

## 2021-01-27 PROCEDURE — 99999 PR PBB SHADOW E&M-EST. PATIENT-LVL IV: ICD-10-PCS | Mod: PBBFAC,,, | Performed by: NURSE PRACTITIONER

## 2021-01-27 PROCEDURE — 3008F PR BODY MASS INDEX (BMI) DOCUMENTED: ICD-10-PCS | Mod: CPTII,S$GLB,, | Performed by: NURSE PRACTITIONER

## 2021-01-27 PROCEDURE — 87086 URINE CULTURE/COLONY COUNT: CPT

## 2021-01-27 PROCEDURE — 1126F AMNT PAIN NOTED NONE PRSNT: CPT | Mod: S$GLB,,, | Performed by: NURSE PRACTITIONER

## 2021-01-27 PROCEDURE — 99999 PR PBB SHADOW E&M-EST. PATIENT-LVL IV: CPT | Mod: PBBFAC,,, | Performed by: NURSE PRACTITIONER

## 2021-01-27 RX ORDER — METRONIDAZOLE 500 MG/1
500 TABLET ORAL
Qty: 14 TABLET | Refills: 0 | Status: SHIPPED | OUTPATIENT
Start: 2021-01-28 | End: 2021-02-01 | Stop reason: SDUPTHER

## 2021-01-29 ENCOUNTER — PATIENT MESSAGE (OUTPATIENT)
Dept: UROGYNECOLOGY | Facility: CLINIC | Age: 46
End: 2021-01-29

## 2021-01-29 LAB — BACTERIA UR CULT: NO GROWTH

## 2021-02-01 DIAGNOSIS — N89.8 VAGINAL DISCHARGE: ICD-10-CM

## 2021-02-01 RX ORDER — METRONIDAZOLE 500 MG/1
500 TABLET ORAL 2 TIMES DAILY
Qty: 6 TABLET | Refills: 0 | Status: SHIPPED | OUTPATIENT
Start: 2021-02-01 | End: 2024-03-26

## 2021-02-26 ENCOUNTER — CLINICAL SUPPORT (OUTPATIENT)
Dept: REHABILITATION | Facility: OTHER | Age: 46
End: 2021-02-26
Payer: COMMERCIAL

## 2021-02-26 DIAGNOSIS — L90.5 SCAR TISSUE: ICD-10-CM

## 2021-02-26 DIAGNOSIS — R27.9 LACK OF COORDINATION: ICD-10-CM

## 2021-02-26 DIAGNOSIS — M62.89 PELVIC FLOOR TENSION: ICD-10-CM

## 2021-02-26 DIAGNOSIS — M79.10 MYALGIA: ICD-10-CM

## 2021-02-26 PROCEDURE — 97112 NEUROMUSCULAR REEDUCATION: CPT | Mod: PN

## 2021-02-26 PROCEDURE — 97162 PT EVAL MOD COMPLEX 30 MIN: CPT | Mod: PN

## 2021-02-26 PROCEDURE — 97140 MANUAL THERAPY 1/> REGIONS: CPT | Mod: PN

## 2021-03-02 ENCOUNTER — PATIENT MESSAGE (OUTPATIENT)
Dept: UROGYNECOLOGY | Facility: CLINIC | Age: 46
End: 2021-03-02

## 2021-03-24 DIAGNOSIS — Z12.31 OTHER SCREENING MAMMOGRAM: ICD-10-CM

## 2021-04-09 ENCOUNTER — PATIENT MESSAGE (OUTPATIENT)
Dept: INTERNAL MEDICINE | Facility: CLINIC | Age: 46
End: 2021-04-09

## 2021-04-09 DIAGNOSIS — Z00.00 PREVENTATIVE HEALTH CARE: Primary | ICD-10-CM

## 2021-04-15 ENCOUNTER — PATIENT MESSAGE (OUTPATIENT)
Dept: INTERNAL MEDICINE | Facility: CLINIC | Age: 46
End: 2021-04-15

## 2021-04-15 ENCOUNTER — PATIENT MESSAGE (OUTPATIENT)
Dept: RESEARCH | Facility: HOSPITAL | Age: 46
End: 2021-04-15

## 2021-04-26 ENCOUNTER — CLINICAL SUPPORT (OUTPATIENT)
Dept: REHABILITATION | Facility: OTHER | Age: 46
End: 2021-04-26
Payer: COMMERCIAL

## 2021-04-26 DIAGNOSIS — R27.9 LACK OF COORDINATION: ICD-10-CM

## 2021-04-26 DIAGNOSIS — M79.10 MYALGIA: Primary | ICD-10-CM

## 2021-04-26 DIAGNOSIS — L90.5 SCAR TISSUE: ICD-10-CM

## 2021-04-26 PROCEDURE — 97140 MANUAL THERAPY 1/> REGIONS: CPT | Mod: PN

## 2021-04-26 PROCEDURE — 97112 NEUROMUSCULAR REEDUCATION: CPT | Mod: PN

## 2021-04-26 PROCEDURE — 97530 THERAPEUTIC ACTIVITIES: CPT | Mod: PN

## 2021-05-14 ENCOUNTER — PATIENT MESSAGE (OUTPATIENT)
Dept: OBSTETRICS AND GYNECOLOGY | Facility: CLINIC | Age: 46
End: 2021-05-14

## 2021-05-14 ENCOUNTER — PATIENT MESSAGE (OUTPATIENT)
Dept: UROGYNECOLOGY | Facility: CLINIC | Age: 46
End: 2021-05-14

## 2021-05-14 DIAGNOSIS — F32.A DEPRESSION, UNSPECIFIED DEPRESSION TYPE: Primary | ICD-10-CM

## 2021-05-14 DIAGNOSIS — F32.A DEPRESSION, UNSPECIFIED DEPRESSION TYPE: ICD-10-CM

## 2021-05-14 RX ORDER — ESCITALOPRAM OXALATE 10 MG/1
10 TABLET ORAL DAILY
Qty: 30 TABLET | Refills: 11 | Status: SHIPPED | OUTPATIENT
Start: 2021-05-14 | End: 2021-05-21 | Stop reason: SDUPTHER

## 2021-05-14 RX ORDER — ESCITALOPRAM OXALATE 10 MG/1
TABLET ORAL
Qty: 60 TABLET | Refills: 11 | Status: SHIPPED | OUTPATIENT
Start: 2021-05-14 | End: 2021-05-14 | Stop reason: SDUPTHER

## 2021-05-17 ENCOUNTER — CLINICAL SUPPORT (OUTPATIENT)
Dept: REHABILITATION | Facility: OTHER | Age: 46
End: 2021-05-17
Payer: COMMERCIAL

## 2021-05-17 DIAGNOSIS — M79.10 MYALGIA: Primary | ICD-10-CM

## 2021-05-17 DIAGNOSIS — R27.9 LACK OF COORDINATION: ICD-10-CM

## 2021-05-17 DIAGNOSIS — L90.5 SCAR TISSUE: ICD-10-CM

## 2021-05-17 PROCEDURE — 97112 NEUROMUSCULAR REEDUCATION: CPT | Mod: PN

## 2021-05-17 PROCEDURE — 97140 MANUAL THERAPY 1/> REGIONS: CPT | Mod: PN

## 2021-05-17 PROCEDURE — 97530 THERAPEUTIC ACTIVITIES: CPT | Mod: PN

## 2021-05-21 RX ORDER — ESCITALOPRAM OXALATE 10 MG/1
10 TABLET ORAL DAILY
Qty: 30 TABLET | Refills: 11 | Status: SHIPPED | OUTPATIENT
Start: 2021-05-21 | End: 2022-06-07 | Stop reason: SDUPTHER

## 2021-05-22 ENCOUNTER — PATIENT MESSAGE (OUTPATIENT)
Dept: UROGYNECOLOGY | Facility: CLINIC | Age: 46
End: 2021-05-22

## 2021-05-31 ENCOUNTER — CLINICAL SUPPORT (OUTPATIENT)
Dept: REHABILITATION | Facility: OTHER | Age: 46
End: 2021-05-31
Payer: COMMERCIAL

## 2021-05-31 DIAGNOSIS — R27.9 LACK OF COORDINATION: ICD-10-CM

## 2021-05-31 DIAGNOSIS — M79.10 MYALGIA: Primary | ICD-10-CM

## 2021-05-31 DIAGNOSIS — L90.5 SCAR TISSUE: ICD-10-CM

## 2021-05-31 PROCEDURE — 97530 THERAPEUTIC ACTIVITIES: CPT | Mod: PN

## 2021-05-31 PROCEDURE — 97112 NEUROMUSCULAR REEDUCATION: CPT | Mod: PN

## 2021-05-31 PROCEDURE — 97140 MANUAL THERAPY 1/> REGIONS: CPT | Mod: PN

## 2021-06-08 ENCOUNTER — PATIENT MESSAGE (OUTPATIENT)
Dept: UROGYNECOLOGY | Facility: CLINIC | Age: 46
End: 2021-06-08

## 2021-06-08 DIAGNOSIS — Z12.31 ENCOUNTER FOR SCREENING MAMMOGRAM FOR MALIGNANT NEOPLASM OF BREAST: Primary | ICD-10-CM

## 2021-06-09 ENCOUNTER — HOSPITAL ENCOUNTER (OUTPATIENT)
Dept: RADIOLOGY | Facility: OTHER | Age: 46
Discharge: HOME | End: 2021-06-09
Attending: FAMILY MEDICINE
Payer: COMMERCIAL

## 2021-06-09 DIAGNOSIS — N64.4 PAIN IN THE BREAST: ICD-10-CM

## 2021-06-09 DIAGNOSIS — Z12.31 ENCOUNTER FOR SCREENING MAMMOGRAM FOR MALIGNANT NEOPLASM OF BREAST: ICD-10-CM

## 2021-06-14 ENCOUNTER — HOSPITAL ENCOUNTER (OUTPATIENT)
Dept: RADIOLOGY | Facility: OTHER | Age: 46
Discharge: HOME | End: 2021-06-14
Attending: FAMILY MEDICINE
Payer: COMMERCIAL

## 2021-06-14 DIAGNOSIS — N63.0 LUMP OR MASS IN BREAST: ICD-10-CM

## 2021-06-14 PROCEDURE — 77066 MAMMO DIGITAL DIAGNOSTIC BILAT WITH TOMO: ICD-10-PCS | Mod: 26,,, | Performed by: RADIOLOGY

## 2021-06-14 PROCEDURE — 77062 BREAST TOMOSYNTHESIS BI: CPT | Mod: 26,,, | Performed by: RADIOLOGY

## 2021-06-14 PROCEDURE — 77066 DX MAMMO INCL CAD BI: CPT | Mod: TC

## 2021-06-14 PROCEDURE — 77062 MAMMO DIGITAL DIAGNOSTIC BILAT WITH TOMO: ICD-10-PCS | Mod: 26,,, | Performed by: RADIOLOGY

## 2021-06-14 PROCEDURE — 77066 DX MAMMO INCL CAD BI: CPT | Mod: 26,,, | Performed by: RADIOLOGY

## 2021-06-17 ENCOUNTER — OFFICE VISIT (OUTPATIENT)
Dept: INTERNAL MEDICINE | Facility: CLINIC | Age: 46
End: 2021-06-17
Attending: FAMILY MEDICINE
Payer: COMMERCIAL

## 2021-06-17 VITALS
HEIGHT: 67 IN | SYSTOLIC BLOOD PRESSURE: 95 MMHG | BODY MASS INDEX: 22.36 KG/M2 | WEIGHT: 142.44 LBS | DIASTOLIC BLOOD PRESSURE: 65 MMHG | HEART RATE: 74 BPM | OXYGEN SATURATION: 99 %

## 2021-06-17 DIAGNOSIS — F41.9 ANXIETY: ICD-10-CM

## 2021-06-17 DIAGNOSIS — R10.13 EPIGASTRIC ABDOMINAL PAIN: Primary | ICD-10-CM

## 2021-06-17 PROCEDURE — 3008F BODY MASS INDEX DOCD: CPT | Mod: CPTII,S$GLB,, | Performed by: FAMILY MEDICINE

## 2021-06-17 PROCEDURE — 99999 PR PBB SHADOW E&M-EST. PATIENT-LVL III: CPT | Mod: PBBFAC,,, | Performed by: FAMILY MEDICINE

## 2021-06-17 PROCEDURE — 99214 PR OFFICE/OUTPT VISIT, EST, LEVL IV, 30-39 MIN: ICD-10-PCS | Mod: S$GLB,,, | Performed by: FAMILY MEDICINE

## 2021-06-17 PROCEDURE — 3008F PR BODY MASS INDEX (BMI) DOCUMENTED: ICD-10-PCS | Mod: CPTII,S$GLB,, | Performed by: FAMILY MEDICINE

## 2021-06-17 PROCEDURE — 99214 OFFICE O/P EST MOD 30 MIN: CPT | Mod: S$GLB,,, | Performed by: FAMILY MEDICINE

## 2021-06-17 PROCEDURE — 99999 PR PBB SHADOW E&M-EST. PATIENT-LVL III: ICD-10-PCS | Mod: PBBFAC,,, | Performed by: FAMILY MEDICINE

## 2021-06-17 PROCEDURE — 1125F AMNT PAIN NOTED PAIN PRSNT: CPT | Mod: S$GLB,,, | Performed by: FAMILY MEDICINE

## 2021-06-17 PROCEDURE — 1125F PR PAIN SEVERITY QUANTIFIED, PAIN PRESENT: ICD-10-PCS | Mod: S$GLB,,, | Performed by: FAMILY MEDICINE

## 2021-06-17 RX ORDER — FERROUS SULFATE, DRIED 160(50) MG
1 TABLET, EXTENDED RELEASE ORAL 2 TIMES DAILY WITH MEALS
COMMUNITY
End: 2024-03-26

## 2021-06-17 RX ORDER — ZINC GLUCONATE 50 MG
15 TABLET ORAL DAILY
COMMUNITY
End: 2024-03-26

## 2021-08-10 DIAGNOSIS — G44.52 NEW DAILY PERSISTENT HEADACHE: ICD-10-CM

## 2021-08-10 RX ORDER — AMITRIPTYLINE HYDROCHLORIDE 25 MG/1
25 TABLET, FILM COATED ORAL NIGHTLY PRN
Qty: 90 TABLET | Refills: 3 | Status: CANCELLED | OUTPATIENT
Start: 2021-08-10 | End: 2022-08-10

## 2022-01-24 ENCOUNTER — PATIENT OUTREACH (OUTPATIENT)
Dept: ADMINISTRATIVE | Facility: OTHER | Age: 47
End: 2022-01-24
Payer: COMMERCIAL

## 2022-01-24 NOTE — PROGRESS NOTES
Health Maintenance Due   Topic Date Due    Hepatitis C Screening  Never done    COVID-19 Vaccine (1) Never done    HIV Screening  Never done    Influenza Vaccine (1) 09/01/2021     Updates were requested from care everywhere.  Chart was reviewed for overdue [facility]active [facility] ([facility]) topics (CRS, Breast Cancer Screening, Eye exam)  Health Maintenance has been updated.  LINKS immunization registry triggered.  Immunizations were reconciled.

## 2022-01-25 ENCOUNTER — OFFICE VISIT (OUTPATIENT)
Dept: UROGYNECOLOGY | Facility: CLINIC | Age: 47
End: 2022-01-25
Payer: COMMERCIAL

## 2022-01-25 VITALS
DIASTOLIC BLOOD PRESSURE: 72 MMHG | HEIGHT: 67 IN | BODY MASS INDEX: 23.46 KG/M2 | SYSTOLIC BLOOD PRESSURE: 108 MMHG | WEIGHT: 149.5 LBS

## 2022-01-25 DIAGNOSIS — N39.46 MIXED STRESS AND URGE URINARY INCONTINENCE: ICD-10-CM

## 2022-01-25 DIAGNOSIS — N89.8 VAGINAL DISCHARGE: ICD-10-CM

## 2022-01-25 DIAGNOSIS — Z01.419 WELL WOMAN EXAM: Primary | ICD-10-CM

## 2022-01-25 PROCEDURE — 1159F MED LIST DOCD IN RCRD: CPT | Mod: CPTII,S$GLB,, | Performed by: NURSE PRACTITIONER

## 2022-01-25 PROCEDURE — 87801 DETECT AGNT MULT DNA AMPLI: CPT | Performed by: NURSE PRACTITIONER

## 2022-01-25 PROCEDURE — 99999 PR PBB SHADOW E&M-EST. PATIENT-LVL III: CPT | Mod: PBBFAC,,, | Performed by: NURSE PRACTITIONER

## 2022-01-25 PROCEDURE — 3074F PR MOST RECENT SYSTOLIC BLOOD PRESSURE < 130 MM HG: ICD-10-PCS | Mod: CPTII,S$GLB,, | Performed by: NURSE PRACTITIONER

## 2022-01-25 PROCEDURE — 3078F PR MOST RECENT DIASTOLIC BLOOD PRESSURE < 80 MM HG: ICD-10-PCS | Mod: CPTII,S$GLB,, | Performed by: NURSE PRACTITIONER

## 2022-01-25 PROCEDURE — 3074F SYST BP LT 130 MM HG: CPT | Mod: CPTII,S$GLB,, | Performed by: NURSE PRACTITIONER

## 2022-01-25 PROCEDURE — 99396 PREV VISIT EST AGE 40-64: CPT | Mod: S$GLB,,, | Performed by: NURSE PRACTITIONER

## 2022-01-25 PROCEDURE — 3008F PR BODY MASS INDEX (BMI) DOCUMENTED: ICD-10-PCS | Mod: CPTII,S$GLB,, | Performed by: NURSE PRACTITIONER

## 2022-01-25 PROCEDURE — 99396 PR PREVENTIVE VISIT,EST,40-64: ICD-10-PCS | Mod: S$GLB,,, | Performed by: NURSE PRACTITIONER

## 2022-01-25 PROCEDURE — 1160F RVW MEDS BY RX/DR IN RCRD: CPT | Mod: CPTII,S$GLB,, | Performed by: NURSE PRACTITIONER

## 2022-01-25 PROCEDURE — 1159F PR MEDICATION LIST DOCUMENTED IN MEDICAL RECORD: ICD-10-PCS | Mod: CPTII,S$GLB,, | Performed by: NURSE PRACTITIONER

## 2022-01-25 PROCEDURE — 3078F DIAST BP <80 MM HG: CPT | Mod: CPTII,S$GLB,, | Performed by: NURSE PRACTITIONER

## 2022-01-25 PROCEDURE — 99999 PR PBB SHADOW E&M-EST. PATIENT-LVL III: ICD-10-PCS | Mod: PBBFAC,,, | Performed by: NURSE PRACTITIONER

## 2022-01-25 PROCEDURE — 3008F BODY MASS INDEX DOCD: CPT | Mod: CPTII,S$GLB,, | Performed by: NURSE PRACTITIONER

## 2022-01-25 PROCEDURE — 1160F PR REVIEW ALL MEDS BY PRESCRIBER/CLIN PHARMACIST DOCUMENTED: ICD-10-PCS | Mod: CPTII,S$GLB,, | Performed by: NURSE PRACTITIONER

## 2022-01-25 PROCEDURE — 87481 CANDIDA DNA AMP PROBE: CPT | Mod: 59 | Performed by: NURSE PRACTITIONER

## 2022-01-25 NOTE — PATIENT INSTRUCTIONS
1. Well woman  --up to date    2. Mixed urinary incontinence, urge > stress:    --Empty bladder every 3 hours.  Empty well: wait a minute, lean forward on toilet.    --Avoid dietary irritants (see sheet).  Keep diary x 3-5 days to determine your irritants.  --KEGELS: do 10 in AM and 10 in PM, holding each x 10 seconds.  When you feel urge to go, STOP, KEGEL, and when urge has passed, then go to bathroom.  Consider PT in future.    --URGE: consider anticholinergic.  Takes 2-4 weeks to see if will have effect.  For dry mouth: get sour, sugar free lozenge or gum.    --STRESS:  Pessary vs. Sling.     3. Vaginal discharge  --affirm today  -- will treat if indicated    4. RTC 1 year for annual    Bladder Irritants  Certain foods and drinks have been associated with worsening symptoms of urinary frequency, urgency, urge incontinence, or  bladder pain. If you suffer from any of these conditions, you may wish to try eliminating one or more of these foods from your  diet and see if your symptoms improve. If bladder symptoms are related to dietary factors, strict adherence to a diet that  eliminates the food should bring marked relief in 10 days. Once you are feeling better, you can begin to add foods back into  your diet, one at a time. If symptoms return, you will be able to identify the irritant. As you add foods back to your diet it is  very important that you drink significant amounts of water.  Low-acid fruit substitutions include apricots, papaya, pears and watermelon. Coffee drinkers can drink Kava or other lowacid  instant drinks. Tea drinkers can substitute non-citrus herbal and sun brewed teas. Calcium carbonate co-buffered with  calcium ascorbate can be substituted for Vitamin C. Prelief is a dietary supplement that works as an acid blocker for the  bladder.  Where to get more information:   Overcoming Bladder Disorders by Tania Sousa and Benjy Macias, 1990   You Dont Have to Live  with Cystitis! By Mary Pickering, 1988  List of Common Bladder Irritants*  Alcoholic beverages  Apples and apple juice  Cantaloupe  Carbonated beverages  Chili and spicy foods  Chocolate  Citrus fruit  Coffee (including decaffeinated)  Cranberries and cranberry juice  Grapes  Guava  Milk Products: milk, cheese, cottage cheese, yogurt, ice cream  Peaches  Pineapple  Plums  Strawberries  Sugar especially artificial sweeteners, saccharin, aspartame, corn sweeteners, honey, fructose, sucrose, lactose  Tea  Tomatoes and tomato juice  Vitamin B complex  Vinegar  *Most people are not sensitive to ALL of these products; your goal is to find the foods that make YOUR  symptoms worse

## 2022-01-25 NOTE — PROGRESS NOTES
01/25/2022    SUBJECTIVE:   46 y.o. female for annual exam.    OPERATIVE NOTE  12/07/2020  Title of Operation:  1)  Robotic-assisted total laparoscopic hysterectomy with bilateral salpingectomy  2)  Cystourethroscopy     Indications for Surgery:  Lynne Velasco is a 45 y.o. here for a urogyn follow up for incomplete bladder emptying.  1000+ urinary retention suddenly 2 days ago.  Has been having intermittent constipation. Occasionally feels like she is not emptying her bladder     Pelvic:  Ext. Genitalia: normal external genitalia. Normal bartholin's and skeens glands  Vagina: + (mild)  atrophy. Normal vaginal mucosa without lesions. No discharge noted.   Non-tender bladder base without palpable mass.  Cervix: no lesions  Uterus:  enlarged to 12 week's size; multiple fibriods palpable--bulky, anterior fibroid palpable to UVJ  (most of anterior vaginal wall) +TTP in LLQ  Urethra: no masses or tenderness  Urethral meatus: no lesions, caruncle or prolapse. Resistance inserting catheter     POP-Q:  Aa -1; Ba -1; C -6; Ap 0; Bp 0; D -8.  Genital hiatus 2, perineal body 2 total vaginal length 11.        Last HPI from 06/10/2019     1. Urinary urgency vs. Frequent uti  --denies SUZI  --rare UUI --wearing liner pad occasionally  --voiding every hour  --nocturia 1-2 times/ night-- limits fluids at least 2 hours prior to bedtime      2. Constipation  --still having some issues: will start fiber  --eats high fiber diet  --incomplete evacuation+     3)recurrent uti  --asymptomatic at this  2/28/2019 e. coli  11/26/2018 e. coli  11/20/2017 e. Coli     11/04/2020  Suds  Urine dipstick: neg.     1.  VOIDING PHASE:       a.  Uroflowmetry:  · Prolapse reduction: No  · Voided volume:  96 mL   · PVR:   25 mL     The overall configuration of this uroflow study was with insufficient volume for interpretation.       b.  Pressure flow:  · Prolapse reduction: No  · Voided volume:   532 mL  · Voiding time:   >5 min   · Peak  flow:  12 mL/s   · Avg flow:  2 mL/s  · Max det pressure:  52  cm H20  · Det pressure at max flow: negative reading as pves catheter was dislodged during study.   · Void initiated by detrusor contraction.    · Urethral relaxation (EMG):  absent.    · PVR (calculated):  68 mL     The overall configuration of this pressure flow study was prolonged with concern for voiding dysfunction.       2.  FILLING PHASE:  · 1st desire: 31 mL  · Normal desire:  150 mL  · Strong desire:  410 mL  · Urgency:  600 mL  · Compliance (calculated)  approx 100 mL/cm H20  · EMG activity during filling:  stable  · Detrusor contractions observed: No.      3.  URETHRAL FUNCTION/STORAGE PHASE:     a.  WITH prolapse reduction:  · CLPP (150 mL): Negative  at  94 cm H20  · VLPP (150 mL): Negative  at  84 cm H20   · CLPP (300 mL): Negative  at  98 cm H20  · VLPP (300 mL): Negative  at  98 cm H20   · CLPP (450 mL): Negative  at  97 cm H20  · VLPP (450 mL): Negative  at  76 cm H20   · CLPP (MAX ):    Negative  at  75 cm H20  · VLPP (MAX):     Negative  at  102 cm H20  · NEG CLPP and VLPP at max cap once pves catheter removed     These findings are consistent with Negative urodynamic stress incontinence.     Assessment:  UF with insufficient volume for interpretation.  PF prolonged with concern for voiding dysfunction.  Compliance normal.  Max capacity 600 mL.  DO (--).  SUZI (--).       Cysto  Findings: Urethroscopy:  Normal.  Cystoscopy:  Normal bladder mucosa--able to see impression of large anterior fibroid just beneath bladder mucosa at bladder base & cephalad to trigone, bilateral ureteral flow was noted.   Assessment: Essentially normal cystourethroscopy with evidence of large, anterior fibroid deep to bladder base.     11/09/2020  Pelvic MRI  UTERUS:   --mid position measuring 11.3 x 9.5 x 10.8 cm cm.   --Endometrial thickness measures not well visualized yet not definitively thickened.   *Fibroids   --Approximately 1 large fibroids  seen   --Fibroid enhancement: Minimally heterogeneous   --Largest fibroid measures 9.2 x 7.1 x 8.1 cm and is located RIGHT mid myometrial level.   CERVIX AND VAGINA: No cervical fibroid.  Nabothian cysts.   OVARIES: Normal.   PELVIS/LOWER ABDOMEN   LYMPH NODES: None enlarged.   URINARY BLADDER: Mildly displaced by the enlarged uterus.   VESSELS: Normal.   MUSCULOSKELETAL: No abnormal marrow signal or muscle edema.   MISCELLANEOUS: None.   Impression:   Dominant large RIGHT mid uterine fibroid measuring 9.2 x 7.1 x 8.1 cm.    Past Medical History:   Diagnosis Date    Abnormal Pap smear 1993    cryo for dysplasia    Anxiety     Depression     History of psychiatric care     prozac, lexapro, ativan    Mental disorder     Depression    Psychiatric problem     Therapy        Past Surgical History:   Procedure Laterality Date    AUGMENTATION OF BREAST      saline, 2010    Breast implants 1998 1998    BREAST SURGERY  1999, 2010    implants    COSMETIC SURGERY  1999, 2010    breast implants    Cryo 1993      dental implant      DILATION AND CURETTAGE OF UTERUS      DILATION AND CURETTAGE OF UTERUS      ENDOMETRIAL ABLATION  July 11,2014    Hysteroscopy with Novasure endometrial ablation    EYE SURGERY      lasik    HYSTERECTOMY      ROBOT-ASSISTED LAPAROSCOPIC HYSTERECTOMY N/A 12/7/2020    Procedure: ROBOTIC HYSTERECTOMY;  Surgeon: Juliane Mcdonnell MD;  Location: [facility];  Service: OB/GYN;  Laterality: N/A;    ROBOT-ASSISTED SURGICAL REMOVAL OF FALLOPIAN TUBE USING DA NAV XI Bilateral 12/7/2020    Procedure: XI ROBOTIC SALPINGECTOMY;  Surgeon: Juliane Mcdonnell MD;  Location: [facility];  Service: OB/GYN;  Laterality: Bilateral;       Family History   Problem Relation Age of Onset    Diabetes Father     Stroke Father     Kidney disease Father     Diabetes Mother     Hypertension Mother     Coronary artery disease Mother     Depression Mother     Seizures Mother     Arthritis Mother     Heart  disease Mother     Breast cancer Maternal Aunt     Cancer Maternal Aunt     Miscarriages / Stillbirths Maternal Aunt     Breast cancer Maternal Aunt     Mental illness Paternal Grandmother     Mental illness Maternal Grandfather     Breast cancer Paternal Aunt     Ovarian cancer Other         maternal great aunt    COPD Maternal Aunt     Heart disease Brother     Colon cancer Neg Hx     Suicide Neg Hx        Social History     Socioeconomic History    Marital status:    Occupational History     Employer: [employer]   Tobacco Use    Smoking status: Former Smoker     Packs/day: 0.00     Years: 3.00     Pack years: 0.00     Types: Cigarettes     Quit date: 10/15/1999     Years since quittin.2    Smokeless tobacco: Never Used   Substance and Sexual Activity    Alcohol use: Yes     Alcohol/week: 4.0 standard drinks     Types: 2 Glasses of wine, 2 Standard drinks or equivalent per week     Comment: social    Drug use: No    Sexual activity: Yes     Partners: Male     Birth control/protection: Partner-Vasectomy, Other-see comments     Comment: ablation   Social History Narrative    - 14 years    From [place]    Works for a law firm- - associates degree    3 children     close with mom       Current Outpatient Medications   Medication Sig Dispense Refill    amitriptyline (ELAVIL) 25 MG tablet Take 1 tablet (25 mg total) by mouth every evening. NO FURTHER REFILLS WITHOUT APPOINTMENT 90 tablet 1    calcium-vitamin D3 (OS-MANJULA 500 + D3) 500 mg-5 mcg (200 unit) per tablet Take 1 tablet by mouth 2 (two) times daily with meals.      EScitalopram oxalate (LEXAPRO) 10 MG tablet Take 1 tablet (10 mg total) by mouth once daily. 30 tablet 11    finasteride (PROSCAR) 5 mg tablet TK 1 T PO QD      LORazepam (ATIVAN) 0.5 MG tablet lorazepam 0.5 mg tablet      melatonin 10 mg Cap Take by mouth.      metroNIDAZOLE (FLAGYL) 500 MG tablet Take 1 tablet (500 mg total) by mouth 2 (two) times daily. 6  "tablet 0    spironolactone (ALDACTONE) 50 MG tablet TK 1 T PO QD FOR 2 WEEKS THEN 2 TS QD FOR 2 WEEKS THEN 3 TS QD THEREAFTER AS TOLERATED  5    zinc gluconate 50 mg tablet Take 15 mg by mouth once daily.       No current facility-administered medications for this visit.       Review of patient's allergies indicates:   Allergen Reactions    Wool Hives    Effexor [venlafaxine] Palpitations       Patient's last menstrual period was 2019 (approximate).    Well Woman:  Pap test: Post hysterectomy 2018 normal neg hpv.  History of abnormal paps: Yes - cryo in the past.  History of STIs:  No  Mammogram: Date of last: 2021 normal    OB History        4    Para   3    Term   3            AB   1    Living   3       SAB   1    IAB        Ectopic        Multiple        Live Births   3                   ROS:  Feeling well.   No dyspnea or chest pain on exertion.    No abdominal pain, change in bowel habits, black or bloody stools.  Very intermittent constipation  Rare SUZI.    GYN ROS: no breast pain or new or enlarging lumps on self exam, no vaginal bleeding, she complains of scraping feeling inside of vagina.   No neurological complaints.    OBJECTIVE:   The patient appears well, alert, oriented x 3, in no distress.  /72 (BP Location: Left arm, Patient Position: Sitting, BP Method: Large (Manual))   Ht 5' 7" (1.702 m)   Wt 67.8 kg (149 lb 7.6 oz)   LMP 2019 (Approximate)   BMI 23.41 kg/m²   ENT normal.  Neck supple. No adenopathy or thyromegaly. JOANIE.   Normal respiratory effort.  Pulse with regular rate and rhythm.   Abdomen soft without tenderness, guarding, mass or organomegaly.   Extremities show no edema, normal peripheral pulses.   Neurological is normal, no focal findings.    BREAST EXAM: bilateral implants, no palpable abnormalities otherwise    PELVIC EXAM:   VULVA: normal appearing vulva with no masses, tenderness or lesions,   VAGINA: vaginal discharge - white and " thick,  CERVIX: surgically absent,   UTERUS: absent,   ADNEXA: no masses,   RECTAL: deferred    ASSESSMENT:   1. Well woman exam     2. Vaginal discharge  Vaginosis Screen by DNA Probe   3. Mixed stress and urge urinary incontinence         PLAN:   1. Well woman  --up to date    2. Mixed urinary incontinence, urge > stress:    --Empty bladder every 3 hours.  Empty well: wait a minute, lean forward on toilet.    --Avoid dietary irritants (see sheet).  Keep diary x 3-5 days to determine your irritants.  --KEGELS: do 10 in AM and 10 in PM, holding each x 10 seconds.  When you feel urge to go, STOP, KEGEL, and when urge has passed, then go to bathroom.  Consider PT in future.    --URGE: consider anticholinergic.  Takes 2-4 weeks to see if will have effect.  For dry mouth: get sour, sugar free lozenge or gum.    --STRESS:  Pessary vs. Sling.     3. Vaginal discharge  --affirm today  -- will treat if indicated    4. RTC 1 year for annual            30 minutes were spent in face to face time with this patient  90 % of this time was spent in counseling and/or coordination of care  Es TORRES Marchand [facility]  Division of Female Pelvic Medicine and Reconstructive Surgery  Department of Obstetrics & Gynecology

## 2022-01-27 ENCOUNTER — PATIENT MESSAGE (OUTPATIENT)
Dept: UROGYNECOLOGY | Facility: CLINIC | Age: 47
End: 2022-01-27
Payer: COMMERCIAL

## 2022-01-27 DIAGNOSIS — B37.31 YEAST VAGINITIS: Primary | ICD-10-CM

## 2022-01-27 LAB
BACTERIAL VAGINOSIS DNA: NEGATIVE
CANDIDA GLABRATA DNA: NEGATIVE
CANDIDA KRUSEI DNA: NEGATIVE
CANDIDA RRNA VAG QL PROBE: POSITIVE
T VAGINALIS RRNA GENITAL QL PROBE: NEGATIVE

## 2022-01-27 RX ORDER — FLUCONAZOLE 150 MG/1
150 TABLET ORAL DAILY
Qty: 1 TABLET | Refills: 0 | Status: SHIPPED | OUTPATIENT
Start: 2022-01-27 | End: 2022-01-28 | Stop reason: SDUPTHER

## 2022-01-28 ENCOUNTER — PATIENT MESSAGE (OUTPATIENT)
Dept: UROGYNECOLOGY | Facility: CLINIC | Age: 47
End: 2022-01-28
Payer: COMMERCIAL

## 2022-01-28 DIAGNOSIS — B37.31 YEAST VAGINITIS: ICD-10-CM

## 2022-01-28 RX ORDER — FLUCONAZOLE 150 MG/1
150 TABLET ORAL
Qty: 3 TABLET | Refills: 0 | Status: SHIPPED | OUTPATIENT
Start: 2022-01-28 | End: 2022-02-02

## 2022-02-08 ENCOUNTER — PATIENT MESSAGE (OUTPATIENT)
Dept: UROGYNECOLOGY | Facility: CLINIC | Age: 47
End: 2022-02-08
Payer: COMMERCIAL

## 2022-02-08 DIAGNOSIS — R35.0 URINARY FREQUENCY: Primary | ICD-10-CM

## 2022-02-09 ENCOUNTER — LAB VISIT (OUTPATIENT)
Dept: LAB | Facility: HOSPITAL | Age: 47
End: 2022-02-09
Payer: COMMERCIAL

## 2022-02-09 ENCOUNTER — PATIENT MESSAGE (OUTPATIENT)
Dept: UROGYNECOLOGY | Facility: CLINIC | Age: 47
End: 2022-02-09
Payer: COMMERCIAL

## 2022-02-09 DIAGNOSIS — N30.00 ACUTE CYSTITIS WITHOUT HEMATURIA: Primary | ICD-10-CM

## 2022-02-09 DIAGNOSIS — R35.0 URINARY FREQUENCY: ICD-10-CM

## 2022-02-09 LAB
BACTERIA #/AREA URNS AUTO: ABNORMAL /HPF
BILIRUB UR QL STRIP: NEGATIVE
CLARITY UR REFRACT.AUTO: ABNORMAL
COLOR UR AUTO: YELLOW
GLUCOSE UR QL STRIP: NEGATIVE
HGB UR QL STRIP: ABNORMAL
HYALINE CASTS UR QL AUTO: 0 /LPF
KETONES UR QL STRIP: NEGATIVE
LEUKOCYTE ESTERASE UR QL STRIP: ABNORMAL
MICROSCOPIC COMMENT: ABNORMAL
NITRITE UR QL STRIP: POSITIVE
PH UR STRIP: 6 [PH] (ref 5–8)
PROT UR QL STRIP: ABNORMAL
RBC #/AREA URNS AUTO: 76 /HPF (ref 0–4)
SP GR UR STRIP: 1.01 (ref 1–1.03)
SQUAMOUS #/AREA URNS AUTO: 11 /HPF
URN SPEC COLLECT METH UR: ABNORMAL
WBC #/AREA URNS AUTO: >100 /HPF (ref 0–5)
WBC CLUMPS UR QL AUTO: ABNORMAL
YEAST UR QL AUTO: ABNORMAL

## 2022-02-09 PROCEDURE — 87077 CULTURE AEROBIC IDENTIFY: CPT | Performed by: NURSE PRACTITIONER

## 2022-02-09 PROCEDURE — 81001 URINALYSIS AUTO W/SCOPE: CPT | Performed by: NURSE PRACTITIONER

## 2022-02-09 PROCEDURE — 87086 URINE CULTURE/COLONY COUNT: CPT | Performed by: NURSE PRACTITIONER

## 2022-02-09 PROCEDURE — 87088 URINE BACTERIA CULTURE: CPT | Performed by: NURSE PRACTITIONER

## 2022-02-09 PROCEDURE — 87186 SC STD MICRODIL/AGAR DIL: CPT | Performed by: NURSE PRACTITIONER

## 2022-02-09 RX ORDER — NITROFURANTOIN 25; 75 MG/1; MG/1
100 CAPSULE ORAL 2 TIMES DAILY
Qty: 14 CAPSULE | Refills: 0 | Status: SHIPPED | OUTPATIENT
Start: 2022-02-09 | End: 2022-02-12

## 2022-02-11 ENCOUNTER — PATIENT MESSAGE (OUTPATIENT)
Dept: UROGYNECOLOGY | Facility: CLINIC | Age: 47
End: 2022-02-11
Payer: COMMERCIAL

## 2022-02-11 LAB — BACTERIA UR CULT: ABNORMAL

## 2022-02-11 RX ORDER — SULFAMETHOXAZOLE AND TRIMETHOPRIM 800; 160 MG/1; MG/1
1 TABLET ORAL 2 TIMES DAILY
Qty: 6 TABLET | Refills: 0
Start: 2022-02-11 | End: 2022-02-12 | Stop reason: SDUPTHER

## 2022-02-12 RX ORDER — SULFAMETHOXAZOLE AND TRIMETHOPRIM 800; 160 MG/1; MG/1
1 TABLET ORAL 2 TIMES DAILY
Qty: 6 TABLET | Refills: 0
Start: 2022-02-12 | End: 2022-02-15

## 2022-02-12 RX ORDER — FLUCONAZOLE 150 MG/1
150 TABLET ORAL
Qty: 3 TABLET | Refills: 0 | Status: SHIPPED | OUTPATIENT
Start: 2022-02-12 | End: 2022-02-17

## 2022-02-23 ENCOUNTER — PATIENT MESSAGE (OUTPATIENT)
Dept: UROGYNECOLOGY | Facility: CLINIC | Age: 47
End: 2022-02-23
Payer: COMMERCIAL

## 2022-02-24 ENCOUNTER — PATIENT OUTREACH (OUTPATIENT)
Dept: ADMINISTRATIVE | Facility: OTHER | Age: 47
End: 2022-02-24
Payer: COMMERCIAL

## 2022-02-25 ENCOUNTER — PATIENT MESSAGE (OUTPATIENT)
Dept: UROGYNECOLOGY | Facility: CLINIC | Age: 47
End: 2022-02-25
Payer: COMMERCIAL

## 2022-02-25 ENCOUNTER — LAB VISIT (OUTPATIENT)
Dept: LAB | Facility: OTHER | Age: 47
End: 2022-02-25
Attending: NURSE PRACTITIONER
Payer: COMMERCIAL

## 2022-02-25 ENCOUNTER — OFFICE VISIT (OUTPATIENT)
Dept: UROGYNECOLOGY | Facility: CLINIC | Age: 47
End: 2022-02-25
Payer: COMMERCIAL

## 2022-02-25 VITALS
WEIGHT: 147.06 LBS | SYSTOLIC BLOOD PRESSURE: 120 MMHG | BODY MASS INDEX: 23.03 KG/M2 | DIASTOLIC BLOOD PRESSURE: 64 MMHG

## 2022-02-25 DIAGNOSIS — R39.15 URINARY URGENCY: Primary | ICD-10-CM

## 2022-02-25 DIAGNOSIS — G47.00 INSOMNIA, UNSPECIFIED TYPE: ICD-10-CM

## 2022-02-25 DIAGNOSIS — N94.9 ADNEXAL FULLNESS: ICD-10-CM

## 2022-02-25 DIAGNOSIS — K59.00 CONSTIPATION, UNSPECIFIED CONSTIPATION TYPE: ICD-10-CM

## 2022-02-25 DIAGNOSIS — M62.89 PELVIC FLOOR TENSION: ICD-10-CM

## 2022-02-25 LAB — TSH SERPL DL<=0.005 MIU/L-ACNC: 1.44 UIU/ML (ref 0.4–4)

## 2022-02-25 PROCEDURE — 99214 OFFICE O/P EST MOD 30 MIN: CPT | Mod: 25,S$GLB,, | Performed by: NURSE PRACTITIONER

## 2022-02-25 PROCEDURE — 99999 PR PBB SHADOW E&M-EST. PATIENT-LVL IV: CPT | Mod: PBBFAC,,, | Performed by: NURSE PRACTITIONER

## 2022-02-25 PROCEDURE — 3078F DIAST BP <80 MM HG: CPT | Mod: CPTII,S$GLB,, | Performed by: NURSE PRACTITIONER

## 2022-02-25 PROCEDURE — 3008F BODY MASS INDEX DOCD: CPT | Mod: CPTII,S$GLB,, | Performed by: NURSE PRACTITIONER

## 2022-02-25 PROCEDURE — 36415 COLL VENOUS BLD VENIPUNCTURE: CPT | Performed by: NURSE PRACTITIONER

## 2022-02-25 PROCEDURE — 57160 PR FIT/INSERT INTRAVAG SUPPORT DEVICE: ICD-10-PCS | Mod: S$GLB,,, | Performed by: NURSE PRACTITIONER

## 2022-02-25 PROCEDURE — 3074F SYST BP LT 130 MM HG: CPT | Mod: CPTII,S$GLB,, | Performed by: NURSE PRACTITIONER

## 2022-02-25 PROCEDURE — 3078F PR MOST RECENT DIASTOLIC BLOOD PRESSURE < 80 MM HG: ICD-10-PCS | Mod: CPTII,S$GLB,, | Performed by: NURSE PRACTITIONER

## 2022-02-25 PROCEDURE — 99999 PR PBB SHADOW E&M-EST. PATIENT-LVL IV: ICD-10-PCS | Mod: PBBFAC,,, | Performed by: NURSE PRACTITIONER

## 2022-02-25 PROCEDURE — 99214 PR OFFICE/OUTPT VISIT, EST, LEVL IV, 30-39 MIN: ICD-10-PCS | Mod: 25,S$GLB,, | Performed by: NURSE PRACTITIONER

## 2022-02-25 PROCEDURE — 1160F PR REVIEW ALL MEDS BY PRESCRIBER/CLIN PHARMACIST DOCUMENTED: ICD-10-PCS | Mod: CPTII,S$GLB,, | Performed by: NURSE PRACTITIONER

## 2022-02-25 PROCEDURE — 1160F RVW MEDS BY RX/DR IN RCRD: CPT | Mod: CPTII,S$GLB,, | Performed by: NURSE PRACTITIONER

## 2022-02-25 PROCEDURE — 1159F MED LIST DOCD IN RCRD: CPT | Mod: CPTII,S$GLB,, | Performed by: NURSE PRACTITIONER

## 2022-02-25 PROCEDURE — 57160 INSERT PESSARY/OTHER DEVICE: CPT | Mod: S$GLB,,, | Performed by: NURSE PRACTITIONER

## 2022-02-25 PROCEDURE — 3008F PR BODY MASS INDEX (BMI) DOCUMENTED: ICD-10-PCS | Mod: CPTII,S$GLB,, | Performed by: NURSE PRACTITIONER

## 2022-02-25 PROCEDURE — 1159F PR MEDICATION LIST DOCUMENTED IN MEDICAL RECORD: ICD-10-PCS | Mod: CPTII,S$GLB,, | Performed by: NURSE PRACTITIONER

## 2022-02-25 PROCEDURE — 3074F PR MOST RECENT SYSTOLIC BLOOD PRESSURE < 130 MM HG: ICD-10-PCS | Mod: CPTII,S$GLB,, | Performed by: NURSE PRACTITIONER

## 2022-02-25 PROCEDURE — 84443 ASSAY THYROID STIM HORMONE: CPT | Performed by: NURSE PRACTITIONER

## 2022-02-25 PROCEDURE — 87086 URINE CULTURE/COLONY COUNT: CPT | Performed by: NURSE PRACTITIONER

## 2022-02-25 RX ORDER — AMITRIPTYLINE HYDROCHLORIDE 50 MG/1
50 TABLET, FILM COATED ORAL NIGHTLY
Qty: 30 TABLET | Refills: 11 | Status: SHIPPED | OUTPATIENT
Start: 2022-02-25 | End: 2022-06-06

## 2022-02-25 NOTE — PROGRESS NOTES
Urogyn follow up  02/25/2022  .  [facility] - UROGYNECOLOGY  [address] [address]    Lynne Velasco  [id number]  1975      Lynne Velasco is a 46 y.o.  here for a urogyn follow up for urinary urgency       OPERATIVE NOTE  12/07/2020  Title of Operation:  1)  Robotic-assisted total laparoscopic hysterectomy with bilateral salpingectomy  2)  Cystourethroscopy     Indications for Surgery:  Lynne Velasco is a 45 y.o. here for a urogyn follow up for incomplete bladder emptying.  1000+ urinary retention suddenly 2 days ago.  Has been having intermittent constipation. Occasionally feels like she is not emptying her bladder     Pelvic:  Ext. Genitalia: normal external genitalia. Normal bartholin's and skeens glands  Vagina: + (mild)  atrophy. Normal vaginal mucosa without lesions. No discharge noted.   Non-tender bladder base without palpable mass.  Cervix: no lesions  Uterus:  enlarged to 12 week's size; multiple fibriods palpable--bulky, anterior fibroid palpable to UVJ  (most of anterior vaginal wall) +TTP in LLQ  Urethra: no masses or tenderness  Urethral meatus: no lesions, caruncle or prolapse. Resistance inserting catheter     POP-Q:  Aa -1; Ba -1; C -6; Ap 0; Bp 0; D -8.  Genital hiatus 2, perineal body 2 total vaginal length 11.        Last HPI from 06/10/2019     1. Urinary urgency vs. Frequent uti  --denies SUZI  --rare UUI --wearing liner pad occasionally  --voiding every hour  --nocturia 1-2 times/ night-- limits fluids at least 2 hours prior to bedtime      2. Constipation  --still having some issues: will start fiber  --eats high fiber diet  --incomplete evacuation+     3)recurrent uti  --asymptomatic at this  2/28/2019 e. coli  11/26/2018 e. coli  11/20/2017 e. Coli     11/04/2020  Suds  Urine dipstick: neg.     1.  VOIDING PHASE:       a.  Uroflowmetry:  · Prolapse reduction: No  · Voided volume:  96 mL   · PVR:   25 mL     The overall  configuration of this uroflow study was with insufficient volume for interpretation.       b.  Pressure flow:  · Prolapse reduction: No  · Voided volume:   532 mL  · Voiding time:   >5 min   · Peak flow:  12 mL/s   · Avg flow:  2 mL/s  · Max det pressure:  52  cm H20  · Det pressure at max flow: negative reading as pves catheter was dislodged during study.   · Void initiated by detrusor contraction.    · Urethral relaxation (EMG):  absent.    · PVR (calculated):  68 mL     The overall configuration of this pressure flow study was prolonged with concern for voiding dysfunction.       2.  FILLING PHASE:  · 1st desire: 31 mL  · Normal desire:  150 mL  · Strong desire:  410 mL  · Urgency:  600 mL  · Compliance (calculated)  approx 100 mL/cm H20  · EMG activity during filling:  stable  · Detrusor contractions observed: No.      3.  URETHRAL FUNCTION/STORAGE PHASE:     a.  WITH prolapse reduction:  · CLPP (150 mL): Negative  at  94 cm H20  · VLPP (150 mL): Negative  at  84 cm H20   · CLPP (300 mL): Negative  at  98 cm H20  · VLPP (300 mL): Negative  at  98 cm H20   · CLPP (450 mL): Negative  at  97 cm H20  · VLPP (450 mL): Negative  at  76 cm H20   · CLPP (MAX ):    Negative  at  75 cm H20  · VLPP (MAX):     Negative  at  102 cm H20  · NEG CLPP and VLPP at max cap once pves catheter removed     These findings are consistent with Negative urodynamic stress incontinence.     Assessment:  UF with insufficient volume for interpretation.  PF prolonged with concern for voiding dysfunction.  Compliance normal.  Max capacity 600 mL.  DO (--).  SUZI (--).       Cysto  Findings: Urethroscopy:  Normal.  Cystoscopy:  Normal bladder mucosa--able to see impression of large anterior fibroid just beneath bladder mucosa at bladder base & cephalad to trigone, bilateral ureteral flow was noted.   Assessment: Essentially normal cystourethroscopy with evidence of large, anterior fibroid deep to bladder base.     11/09/2020  Pelvic  MRI  UTERUS:   --mid position measuring 11.3 x 9.5 x 10.8 cm cm.   --Endometrial thickness measures not well visualized yet not definitively thickened.   *Fibroids   --Approximately 1 large fibroids seen   --Fibroid enhancement: Minimally heterogeneous   --Largest fibroid measures 9.2 x 7.1 x 8.1 cm and is located RIGHT mid myometrial level.   CERVIX AND VAGINA: No cervical fibroid.  Nabothian cysts.   OVARIES: Normal.   PELVIS/LOWER ABDOMEN   LYMPH NODES: None enlarged.   URINARY BLADDER: Mildly displaced by the enlarged uterus.   VESSELS: Normal.   MUSCULOSKELETAL: No abnormal marrow signal or muscle edema.   MISCELLANEOUS: None.   Impression:   Dominant large RIGHT mid uterine fibroid measuring 9.2 x 7.1 x 8.1 cm.    Changes from last visit:  1. Concern for frequent uti  --not wearing pad  -- very rare SUZI  --02/09/2022 KLEBSIELLA AEROGENES  >100,000 cfu/ml --originally on macrobid-- changed to bactrim   --01/27/2021 negative  --12/11/2020 e. coli    2. Has intermittent suprapubic pressure/ urinary urgency. Has intermittent pain in either lower quadrant.     3. Intermittent constipation  --recently restarted metamucil  -- drinking 5 16.8 ounce bottles    Past Medical History:   Diagnosis Date    Abnormal Pap smear 1993    cryo for dysplasia    Anxiety     Depression     History of psychiatric care     prozac, lexapro, ativan    Mental disorder     Depression    Psychiatric problem     Therapy        Past Surgical History:   Procedure Laterality Date    AUGMENTATION OF BREAST      saline, 2010    Breast implants 1998 1998    BREAST SURGERY  1999, 2010    implants    COSMETIC SURGERY  1999, 2010    breast implants    Cryo 1993      dental implant      DILATION AND CURETTAGE OF UTERUS      DILATION AND CURETTAGE OF UTERUS      ENDOMETRIAL ABLATION  July 11,2014    Hysteroscopy with Novasure endometrial ablation    EYE SURGERY      lasik    HYSTERECTOMY      ROBOT-ASSISTED LAPAROSCOPIC  HYSTERECTOMY N/A 2020    Procedure: ROBOTIC HYSTERECTOMY;  Surgeon: Juliane Mcdonnell MD;  Location: [facility] OR;  Service: OB/GYN;  Laterality: N/A;    ROBOT-ASSISTED SURGICAL REMOVAL OF FALLOPIAN TUBE USING DA NAV XI Bilateral 2020    Procedure: XI ROBOTIC SALPINGECTOMY;  Surgeon: Juliane Mcdonnell MD;  Location: [facility] OR;  Service: OB/GYN;  Laterality: Bilateral;       Family History   Problem Relation Age of Onset    Diabetes Father     Stroke Father     Kidney disease Father     Diabetes Mother     Hypertension Mother     Coronary artery disease Mother     Depression Mother     Seizures Mother     Arthritis Mother     Heart disease Mother     Breast cancer Maternal Aunt     Cancer Maternal Aunt     Miscarriages / Stillbirths Maternal Aunt     Breast cancer Maternal Aunt     Mental illness Paternal Grandmother     Mental illness Maternal Grandfather     Breast cancer Paternal Aunt     Ovarian cancer Other         maternal great aunt    COPD Maternal Aunt     Heart disease Brother     Colon cancer Neg Hx     Suicide Neg Hx        Social History     Socioeconomic History    Marital status:    Occupational History     Employer: [employer]   Tobacco Use    Smoking status: Former Smoker     Packs/day: 0.00     Years: 3.00     Pack years: 0.00     Types: Cigarettes     Quit date: 10/15/1999     Years since quittin.3    Smokeless tobacco: Never Used   Substance and Sexual Activity    Alcohol use: Yes     Alcohol/week: 4.0 standard drinks     Types: 2 Glasses of wine, 2 Standard drinks or equivalent per week     Comment: social    Drug use: No    Sexual activity: Yes     Partners: Male     Birth control/protection: Partner-Vasectomy, Other-see comments     Comment: ablation   Social History Narrative    - 14 years    From [facility]    Works for a law firm- - associates degree    3 children     close with mom       Current Outpatient Medications   Medication Sig Dispense  Refill    calcium-vitamin D3 (OS-MANJULA 500 + D3) 500 mg-5 mcg (200 unit) per tablet Take 1 tablet by mouth 2 (two) times daily with meals.      EScitalopram oxalate (LEXAPRO) 10 MG tablet Take 1 tablet (10 mg total) by mouth once daily. 30 tablet 11    finasteride (PROSCAR) 5 mg tablet TK 1 T PO QD      LORazepam (ATIVAN) 0.5 MG tablet lorazepam 0.5 mg tablet      melatonin 10 mg Cap Take by mouth.      metroNIDAZOLE (FLAGYL) 500 MG tablet Take 1 tablet (500 mg total) by mouth 2 (two) times daily. 6 tablet 0    spironolactone (ALDACTONE) 50 MG tablet TK 1 T PO QD FOR 2 WEEKS THEN 2 TS QD FOR 2 WEEKS THEN 3 TS QD THEREAFTER AS TOLERATED  5    zinc gluconate 50 mg tablet Take 15 mg by mouth once daily.      amitriptyline (ELAVIL) 50 MG tablet Take 1 tablet (50 mg total) by mouth every evening. 30 tablet 11     No current facility-administered medications for this visit.       Review of patient's allergies indicates:   Allergen Reactions    Wool Hives    Effexor [venlafaxine] Palpitations       Well woman:  Pap:  Mammo:  Colonoscopy:  Dexa:    ROS:  As per HPI.      Exam  /64 (BP Location: Right arm, Patient Position: Sitting, BP Method: Medium (Manual))   Wt 66.7 kg (147 lb 0.8 oz)   LMP 01/22/2019 (Approximate)   BMI 23.03 kg/m²   General: alert and oriented, no acute distress  Respiratory: normal respiratory effort  Abd: soft, non-tender, non-distended    Pelvic  Ext. Genitalia: normal external genitalia. Normal bartholin's and skeens glands  Vagina: -- atrophy. Normal vaginal mucosa without lesions. No discharge noted.   Non-tender bladder base without palpable mass.  +TTP in bilateral levator ani. + TTP in LLQ- mild adnexal fullness  Cervix: absent  Uterus:  absent   Urethra: no masses or tenderness  Urethral meatus: no lesions, caruncle or prolapse.    POP-Q:  No pop    pvr 70 mL    Impression  1. Urinary urgency  Ambulatory referral/consult to Physical/Occupational Therapy    Urine culture   2.  Adnexal fullness  US Pelvis Comp with Transvag NON-OB (xpd   3. Pelvic floor tension  Ambulatory referral/consult to Physical/Occupational Therapy    amitriptyline (ELAVIL) 50 MG tablet   4. Constipation, unspecified constipation type  TSH   5. Insomnia, unspecified type  amitriptyline (ELAVIL) 50 MG tablet     We reviewed the above issues and discussed options for short-term versus long-term management of her problems.   Plan:   1.  Frequent UTIs (bladder infections):  --urine C&S sent today  --If you feel like you have a UTI, please call our office so that we can place an order for you to drop off a urine specimen at the closest [facility] lab (we will arrange).     --We will call in antibiotics for you to start right after you drop off specimen.     --In this way, we can determine:     1)  Do you have a UTI?      2) If you have a UTI, is it sensitive to the antibiotics we prescribed?   --follow UTI prevention tips (see attached)  --control bowel movements/fecal cross-contamination  --empty bladder before and after intercourse  --consider need for further evaluation (pelvic imaging and cystoscopy) if issue persists    2. Constipation  --continue fiber  --increase water intake\  --add stool softener    3. Adnexal fullness  --pelvic ultrasound ordered    4. RTC 4 months for follow up    I spent a total of 30 minutes on the day of the visit.  This includes face to face time and non-face to face time preparing to see the patient (eg, review of tests), obtaining and/or reviewing separately obtained history, documenting clinical information in the electronic or other health record, independently interpreting results and communicating results to the patient/family/caregiver, or care coordinator.    Es Varma, [facility]  Division of Female Pelvic Medicine and Reconstructive Surgery  Department of Obstetrics & Gynecology

## 2022-02-25 NOTE — PATIENT INSTRUCTIONS
1.  Frequent UTIs (bladder infections):  --urine C&S sent today  --If you feel like you have a UTI, please call our office so that we can place an order for you to drop off a urine specimen at the closest [facility] lab (we will arrange).     --We will call in antibiotics for you to start right after you drop off specimen.     --In this way, we can determine:     1)  Do you have a UTI?      2) If you have a UTI, is it sensitive to the antibiotics we prescribed?   --follow UTI prevention tips (see attached)  --control bowel movements/fecal cross-contamination  --empty bladder before and after intercourse  --consider need for further evaluation (pelvic imaging and cystoscopy) if issue persists    2. Constipation  --continue fiber  --increase water intake\  --add stool softener  --tsh today    3. Adnexal fullness  --pelvic ultrasound ordered    4.pelvic floor tension  --restart pelvic floor PT  --increase amitriptyline to 50 mg nighly    5. RTC 4 months for follow up

## 2022-02-26 LAB — BACTERIA UR CULT: NO GROWTH

## 2022-02-28 ENCOUNTER — PATIENT MESSAGE (OUTPATIENT)
Dept: UROGYNECOLOGY | Facility: CLINIC | Age: 47
End: 2022-02-28
Payer: COMMERCIAL

## 2022-03-10 ENCOUNTER — OFFICE VISIT (OUTPATIENT)
Dept: INTERNAL MEDICINE | Facility: CLINIC | Age: 47
End: 2022-03-10
Attending: FAMILY MEDICINE
Payer: COMMERCIAL

## 2022-03-10 ENCOUNTER — LAB VISIT (OUTPATIENT)
Dept: LAB | Facility: OTHER | Age: 47
End: 2022-03-10
Attending: FAMILY MEDICINE
Payer: COMMERCIAL

## 2022-03-10 VITALS
WEIGHT: 146.69 LBS | BODY MASS INDEX: 23.02 KG/M2 | HEART RATE: 93 BPM | OXYGEN SATURATION: 100 % | SYSTOLIC BLOOD PRESSURE: 100 MMHG | DIASTOLIC BLOOD PRESSURE: 70 MMHG | HEIGHT: 67 IN

## 2022-03-10 DIAGNOSIS — Z00.00 PREVENTATIVE HEALTH CARE: Primary | ICD-10-CM

## 2022-03-10 DIAGNOSIS — Z00.00 PREVENTATIVE HEALTH CARE: ICD-10-CM

## 2022-03-10 DIAGNOSIS — F41.9 ANXIETY: ICD-10-CM

## 2022-03-10 LAB
ALBUMIN SERPL BCP-MCNC: 4.3 G/DL (ref 3.5–5.2)
ALP SERPL-CCNC: 33 U/L (ref 55–135)
ALT SERPL W/O P-5'-P-CCNC: 21 U/L (ref 10–44)
ANION GAP SERPL CALC-SCNC: 8 MMOL/L (ref 8–16)
AST SERPL-CCNC: 25 U/L (ref 10–40)
BASOPHILS # BLD AUTO: 0.06 K/UL (ref 0–0.2)
BASOPHILS NFR BLD: 0.8 % (ref 0–1.9)
BILIRUB SERPL-MCNC: 0.6 MG/DL (ref 0.1–1)
BUN SERPL-MCNC: 21 MG/DL (ref 6–20)
CALCIUM SERPL-MCNC: 9.6 MG/DL (ref 8.7–10.5)
CHLORIDE SERPL-SCNC: 104 MMOL/L (ref 95–110)
CHOLEST SERPL-MCNC: 151 MG/DL (ref 120–199)
CHOLEST/HDLC SERPL: 2.3 {RATIO} (ref 2–5)
CO2 SERPL-SCNC: 25 MMOL/L (ref 23–29)
CREAT SERPL-MCNC: 0.9 MG/DL (ref 0.5–1.4)
DIFFERENTIAL METHOD: ABNORMAL
EOSINOPHIL # BLD AUTO: 0.1 K/UL (ref 0–0.5)
EOSINOPHIL NFR BLD: 0.7 % (ref 0–8)
ERYTHROCYTE [DISTWIDTH] IN BLOOD BY AUTOMATED COUNT: 12.8 % (ref 11.5–14.5)
EST. GFR  (AFRICAN AMERICAN): >60 ML/MIN/1.73 M^2
EST. GFR  (NON AFRICAN AMERICAN): >60 ML/MIN/1.73 M^2
ESTIMATED AVG GLUCOSE: 97 MG/DL (ref 68–131)
GLUCOSE SERPL-MCNC: 95 MG/DL (ref 70–110)
HBA1C MFR BLD: 5 % (ref 4–5.6)
HCT VFR BLD AUTO: 37.4 % (ref 37–48.5)
HDLC SERPL-MCNC: 65 MG/DL (ref 40–75)
HDLC SERPL: 43 % (ref 20–50)
HGB BLD-MCNC: 12.6 G/DL (ref 12–16)
IMM GRANULOCYTES # BLD AUTO: 0.01 K/UL (ref 0–0.04)
IMM GRANULOCYTES NFR BLD AUTO: 0.1 % (ref 0–0.5)
LDLC SERPL CALC-MCNC: 76.2 MG/DL (ref 63–159)
LYMPHOCYTES # BLD AUTO: 2.2 K/UL (ref 1–4.8)
LYMPHOCYTES NFR BLD: 29.7 % (ref 18–48)
MCH RBC QN AUTO: 30.1 PG (ref 27–31)
MCHC RBC AUTO-ENTMCNC: 33.7 G/DL (ref 32–36)
MCV RBC AUTO: 89 FL (ref 82–98)
MONOCYTES # BLD AUTO: 0.5 K/UL (ref 0.3–1)
MONOCYTES NFR BLD: 6.8 % (ref 4–15)
NEUTROPHILS # BLD AUTO: 4.6 K/UL (ref 1.8–7.7)
NEUTROPHILS NFR BLD: 61.9 % (ref 38–73)
NONHDLC SERPL-MCNC: 86 MG/DL
NRBC BLD-RTO: 0 /100 WBC
PLATELET # BLD AUTO: 306 K/UL (ref 150–450)
PMV BLD AUTO: 8.6 FL (ref 9.2–12.9)
POTASSIUM SERPL-SCNC: 3.9 MMOL/L (ref 3.5–5.1)
PROT SERPL-MCNC: 6.9 G/DL (ref 6–8.4)
RBC # BLD AUTO: 4.19 M/UL (ref 4–5.4)
SODIUM SERPL-SCNC: 137 MMOL/L (ref 136–145)
TRIGL SERPL-MCNC: 49 MG/DL (ref 30–150)
TSH SERPL DL<=0.005 MIU/L-ACNC: 1.72 UIU/ML (ref 0.4–4)
WBC # BLD AUTO: 7.5 K/UL (ref 3.9–12.7)

## 2022-03-10 PROCEDURE — 99999 PR PBB SHADOW E&M-EST. PATIENT-LVL IV: ICD-10-PCS | Mod: PBBFAC,,, | Performed by: FAMILY MEDICINE

## 2022-03-10 PROCEDURE — 99999 PR PBB SHADOW E&M-EST. PATIENT-LVL IV: CPT | Mod: PBBFAC,,, | Performed by: FAMILY MEDICINE

## 2022-03-10 PROCEDURE — 84443 ASSAY THYROID STIM HORMONE: CPT | Performed by: FAMILY MEDICINE

## 2022-03-10 PROCEDURE — 3074F PR MOST RECENT SYSTOLIC BLOOD PRESSURE < 130 MM HG: ICD-10-PCS | Mod: CPTII,S$GLB,, | Performed by: FAMILY MEDICINE

## 2022-03-10 PROCEDURE — 3074F SYST BP LT 130 MM HG: CPT | Mod: CPTII,S$GLB,, | Performed by: FAMILY MEDICINE

## 2022-03-10 PROCEDURE — 99396 PR PREVENTIVE VISIT,EST,40-64: ICD-10-PCS | Mod: S$GLB,,, | Performed by: FAMILY MEDICINE

## 2022-03-10 PROCEDURE — 1159F MED LIST DOCD IN RCRD: CPT | Mod: CPTII,S$GLB,, | Performed by: FAMILY MEDICINE

## 2022-03-10 PROCEDURE — 1160F RVW MEDS BY RX/DR IN RCRD: CPT | Mod: CPTII,S$GLB,, | Performed by: FAMILY MEDICINE

## 2022-03-10 PROCEDURE — 1159F PR MEDICATION LIST DOCUMENTED IN MEDICAL RECORD: ICD-10-PCS | Mod: CPTII,S$GLB,, | Performed by: FAMILY MEDICINE

## 2022-03-10 PROCEDURE — 3078F PR MOST RECENT DIASTOLIC BLOOD PRESSURE < 80 MM HG: ICD-10-PCS | Mod: CPTII,S$GLB,, | Performed by: FAMILY MEDICINE

## 2022-03-10 PROCEDURE — 85025 COMPLETE CBC W/AUTO DIFF WBC: CPT | Performed by: FAMILY MEDICINE

## 2022-03-10 PROCEDURE — 80053 COMPREHEN METABOLIC PANEL: CPT | Performed by: FAMILY MEDICINE

## 2022-03-10 PROCEDURE — 3008F PR BODY MASS INDEX (BMI) DOCUMENTED: ICD-10-PCS | Mod: CPTII,S$GLB,, | Performed by: FAMILY MEDICINE

## 2022-03-10 PROCEDURE — 3078F DIAST BP <80 MM HG: CPT | Mod: CPTII,S$GLB,, | Performed by: FAMILY MEDICINE

## 2022-03-10 PROCEDURE — 80061 LIPID PANEL: CPT | Performed by: FAMILY MEDICINE

## 2022-03-10 PROCEDURE — 1160F PR REVIEW ALL MEDS BY PRESCRIBER/CLIN PHARMACIST DOCUMENTED: ICD-10-PCS | Mod: CPTII,S$GLB,, | Performed by: FAMILY MEDICINE

## 2022-03-10 PROCEDURE — 3008F BODY MASS INDEX DOCD: CPT | Mod: CPTII,S$GLB,, | Performed by: FAMILY MEDICINE

## 2022-03-10 PROCEDURE — 83036 HEMOGLOBIN GLYCOSYLATED A1C: CPT | Performed by: FAMILY MEDICINE

## 2022-03-10 PROCEDURE — 36415 COLL VENOUS BLD VENIPUNCTURE: CPT | Performed by: FAMILY MEDICINE

## 2022-03-10 PROCEDURE — 99396 PREV VISIT EST AGE 40-64: CPT | Mod: S$GLB,,, | Performed by: FAMILY MEDICINE

## 2022-03-10 RX ORDER — MINOXIDIL 10 MG/1
2.5 TABLET ORAL DAILY
COMMUNITY

## 2022-03-10 RX ORDER — LORAZEPAM 0.5 MG/1
0.5 TABLET ORAL EVERY 6 HOURS PRN
Qty: 30 TABLET | Refills: 0 | Status: SHIPPED | OUTPATIENT
Start: 2022-03-10 | End: 2022-04-09

## 2022-03-10 NOTE — PROGRESS NOTES
"CHIEF COMPLAINT:   Epigastric pain    HISTORY OF PRESENT ILLNESS: The patient is a very pleasant 46-year-old white female.  She presents for annual.  She is doing well overall.      REVIEW OF SYSTEMS:  GENERAL: No fever, chills, fatigability or weight loss.  SKIN: No rashes, itching or changes in color or texture of skin.  HEAD: The patient does not have headaches and recent head trauma.  EYES: Visual acuity fine. No photophobia, ocular pain or diplopia.  EARS: Denies ear pain, discharge or vertigo.  NOSE: No loss of smell, no epistaxis or postnasal drip.  MOUTH & THROAT: No hoarseness or change in voice. No excessive gum bleeding.  NODES: Denies swollen glands.  CHEST: Denies ROJO, cyanosis, wheezing, cough and sputum production.  CARDIOVASCULAR: Denies chest pain, PND, orthopnea or reduced exercise tolerance.  ABDOMEN: Appetite decreased. No weight loss. Denies diarrhea, hematemesis or blood in stool.    URINARY: No flank pain or hematuria.  PERIPHERAL VASCULAR: No claudication or cyanosis.  MUSCULOSKELETAL: No joint stiffness or swelling. Denies back pain.  NEUROLOGIC: No history of seizures, paralysis, alteration of gait or coordination.    SOCIAL HISTORY:  She does not smoke.  She consumes alcohol socially.  She is .    PHYSICAL EXAMINATION:   Blood pressure 100/70, pulse 93, height 5' 7" (1.702 m), weight 66.6 kg (146 lb 11.5 oz), last menstrual period 01/22/2019, SpO2 100 %.  APPEARANCE: Well nourished, well developed, in no acute distress.    HEAD: Normocephalic, atraumatic.  EYES: PERRL. EOMI.  Conjunctivae without injection and  anicteric  NOSE: Mucosa pink. Airway clear.  MOUTH & THROAT: No tonsillar enlargement. No pharyngeal erythema or exudate. No stridor.  NECK: Supple.   NODES: No cervical, axillary or inguinal lymph node enlargement.  ABDOMEN: Bowel sounds normal. Not distended. Soft. No guarding or rebound tenderness or masses.  No ascites is noted.  MUSCULOSKELETAL:  There is no clubbing, " cyanosis, or edema of the extremities x4.  There is full range of motion of the lumbar spine.  There is full range of motion of the extremities x4.  There is no deformity noted.    NEUROLOGIC:       Dev Coma Scale 15      Normal speech development.      Hearing normal.      Normal gait.      Motor and sensory exams grossly normal.  PSYCHIATRIC: Patient is alert and oriented x3.  Thought processes are all normal.  There is no homicidality.  There is no suicidality.  There is no evidence of psychosis.    LABORATORY/RADIOLOGY:Chart reviewed.    ASSESSMENT:   Annual  Depression and anxiety, resolved  Insomnia, mild    PLAN:   We will follow-up blood work which we expect to be normal.  Return to clinic in a year

## 2022-06-02 ENCOUNTER — PATIENT MESSAGE (OUTPATIENT)
Dept: INTERNAL MEDICINE | Facility: CLINIC | Age: 47
End: 2022-06-02
Payer: COMMERCIAL

## 2022-06-02 DIAGNOSIS — G43.909 MIGRAINE WITHOUT STATUS MIGRAINOSUS, NOT INTRACTABLE, UNSPECIFIED MIGRAINE TYPE: Primary | ICD-10-CM

## 2022-06-07 ENCOUNTER — PATIENT MESSAGE (OUTPATIENT)
Dept: ORTHOPEDICS | Facility: CLINIC | Age: 47
End: 2022-06-07
Payer: COMMERCIAL

## 2022-06-07 ENCOUNTER — TELEPHONE (OUTPATIENT)
Dept: ORTHOPEDICS | Facility: CLINIC | Age: 47
End: 2022-06-07
Payer: COMMERCIAL

## 2022-06-07 DIAGNOSIS — G47.00 INSOMNIA, UNSPECIFIED TYPE: ICD-10-CM

## 2022-06-07 DIAGNOSIS — F32.A DEPRESSION, UNSPECIFIED DEPRESSION TYPE: ICD-10-CM

## 2022-06-07 DIAGNOSIS — M62.89 PELVIC FLOOR TENSION: ICD-10-CM

## 2022-06-07 RX ORDER — AMITRIPTYLINE HYDROCHLORIDE 50 MG/1
50 TABLET, FILM COATED ORAL NIGHTLY PRN
Qty: 90 TABLET | Refills: 1 | Status: SHIPPED | OUTPATIENT
Start: 2022-06-07 | End: 2022-12-28

## 2022-06-07 RX ORDER — ESCITALOPRAM OXALATE 10 MG/1
10 TABLET ORAL DAILY
Qty: 30 TABLET | Refills: 11 | Status: SHIPPED | OUTPATIENT
Start: 2022-06-07 | End: 2023-10-09 | Stop reason: SDUPTHER

## 2022-06-07 NOTE — TELEPHONE ENCOUNTER
No new care gaps identified.  [facility] Embedded Care Gaps. Reference number: [id number]. 6/07/2022   12:38:14 PM CDT

## 2022-06-07 NOTE — TELEPHONE ENCOUNTER
----- Message from Shraddha Cesar sent at 6/7/2022 11:52 AM CDT -----  Regarding: [facility]  Type: RX Refill Request    Who Called: pharmacy    Have you contacted your pharmacy: yes    Refill or New Rx: refill    RX Name and Strength:EScitalopram oxalate (LEXAPRO) 10 MG tablet and amitriptyline (ELAVIL) 50 MG tablet (they did not receive yesterdays RX)      Preferred Pharmacy with phone number:   [facility] - [place], [place] - [address]  [address] [address]  Phone: [phone] Fax: [phone]      Local or Mail Order: mail order

## 2022-06-07 NOTE — TELEPHONE ENCOUNTER
Requested medication has been pended and routed to Dr. Kuo for approval. [facility] states that they never received requested medication that was sent over on yesterday. Thanks. LOV 3/10/2022

## 2022-06-07 NOTE — TELEPHONE ENCOUNTER
6/7/22 [facility] for pt to call the referrals department to schedule appt with Pain clinic-

## 2022-06-16 ENCOUNTER — TELEPHONE (OUTPATIENT)
Dept: PAIN MEDICINE | Facility: CLINIC | Age: 47
End: 2022-06-16
Payer: COMMERCIAL

## 2022-06-16 ENCOUNTER — PATIENT MESSAGE (OUTPATIENT)
Dept: GASTROENTEROLOGY | Facility: CLINIC | Age: 47
End: 2022-06-16

## 2022-06-16 ENCOUNTER — OFFICE VISIT (OUTPATIENT)
Dept: GASTROENTEROLOGY | Facility: CLINIC | Age: 47
End: 2022-06-16
Payer: COMMERCIAL

## 2022-06-16 DIAGNOSIS — R13.10 DYSPHAGIA, UNSPECIFIED TYPE: Primary | ICD-10-CM

## 2022-06-16 DIAGNOSIS — R12 HEARTBURN: ICD-10-CM

## 2022-06-16 DIAGNOSIS — R49.0 HOARSENESS OF VOICE: ICD-10-CM

## 2022-06-16 PROCEDURE — 99204 OFFICE O/P NEW MOD 45 MIN: CPT | Mod: 95,,, | Performed by: INTERNAL MEDICINE

## 2022-06-16 PROCEDURE — 3044F HG A1C LEVEL LT 7.0%: CPT | Mod: CPTII,95,, | Performed by: INTERNAL MEDICINE

## 2022-06-16 PROCEDURE — 99204 PR OFFICE/OUTPT VISIT, NEW, LEVL IV, 45-59 MIN: ICD-10-PCS | Mod: 95,,, | Performed by: INTERNAL MEDICINE

## 2022-06-16 PROCEDURE — 3044F PR MOST RECENT HEMOGLOBIN A1C LEVEL <7.0%: ICD-10-PCS | Mod: CPTII,95,, | Performed by: INTERNAL MEDICINE

## 2022-06-16 RX ORDER — PANTOPRAZOLE SODIUM 40 MG/1
40 TABLET, DELAYED RELEASE ORAL DAILY
Qty: 30 TABLET | Refills: 3 | Status: SHIPPED | OUTPATIENT
Start: 2022-06-16 | End: 2022-08-22

## 2022-06-16 NOTE — PROGRESS NOTES
[facility]  Gastroenterology  The patient location is: Home  The chief complaint leading to consultation is: Dysphagia     Visit type: audiovisual    Face to Face time with patient: 10 minutes of total time spent on the encounter, which includes face to face time and non-face to face time preparing to see the patient (eg, review of tests), Obtaining and/or reviewing separately obtained history, Documenting clinical information in the electronic or other health record, Independently interpreting results (not separately reported) and communicating results to the patient/family/caregiver, or Care coordination (not separately reported).         Each patient to whom he or she provides medical services by telemedicine is:  (1) informed of the relationship between the physician and patient and the respective role of any other health care provider with respect to management of the patient; and (2) notified that he or she may decline to receive medical services by telemedicine and may withdraw from such care at any time.    Notes:     PCP: Jf Kuo MD    6/16/22        Subjective:   Lynne Velasco is a 46 y.o. female here for evaluation of dysphagia. States it has been going on for about 3 months to both liquids and solids. She saw ENT and was placed on some medications (saline rinses, ect) for sinuses but her dysphagia did not improve. She also has some hoarseness of voice. She was then referred to GI. She reports occasional heartburn but does not take anything for it. Thinks she had an EGD a long time ago.       Past Medical History:   Diagnosis Date    Abnormal Pap smear 1993    cryo for dysplasia    Anxiety     Depression     History of psychiatric care     prozac, lexapro, ativan    Mental disorder     Depression    Psychiatric problem     Therapy        Past Surgical History:   Procedure Laterality Date    AUGMENTATION OF BREAST      saline, 2010    Breast implants  1998  1998    BREAST SURGERY  1999, 2010    implants    COSMETIC SURGERY  1999, 2010    breast implants    Cryo 1993      dental implant      DILATION AND CURETTAGE OF UTERUS      DILATION AND CURETTAGE OF UTERUS      ENDOMETRIAL ABLATION  July 11,2014    Hysteroscopy with Novasure endometrial ablation    EYE SURGERY      lasik    HYSTERECTOMY      ROBOT-ASSISTED LAPAROSCOPIC HYSTERECTOMY N/A 12/7/2020    Procedure: ROBOTIC HYSTERECTOMY;  Surgeon: Juliane Mcdonnell MD;  Location: [facility];  Service: OB/GYN;  Laterality: N/A;    ROBOT-ASSISTED SURGICAL REMOVAL OF FALLOPIAN TUBE USING DA NAV XI Bilateral 12/7/2020    Procedure: XI ROBOTIC SALPINGECTOMY;  Surgeon: Juliane Mcdonnell MD;  Location: [facility];  Service: OB/GYN;  Laterality: Bilateral;       Current Outpatient Medications on File Prior to Visit   Medication Sig Dispense Refill    amitriptyline (ELAVIL) 50 MG tablet Take 1 tablet (50 mg total) by mouth nightly as needed for Insomnia. 90 tablet 1    calcium-vitamin D3 (OS-MANJULA 500 + D3) 500 mg-5 mcg (200 unit) per tablet Take 1 tablet by mouth 2 (two) times daily with meals.      EScitalopram oxalate (LEXAPRO) 10 MG tablet Take 1 tablet (10 mg total) by mouth once daily. 30 tablet 11    finasteride (PROSCAR) 5 mg tablet TK 1 T PO QD      LORazepam (ATIVAN) 0.5 MG tablet lorazepam 0.5 mg tablet      melatonin 10 mg Cap Take by mouth.      metroNIDAZOLE (FLAGYL) 500 MG tablet Take 1 tablet (500 mg total) by mouth 2 (two) times daily. (Patient not taking: Reported on 3/10/2022) 6 tablet 0    minoxidiL (LONITEN) 10 MG Tab Take 2.5 mg by mouth once daily. Half pill daily      spironolactone (ALDACTONE) 50 MG tablet TK 1 T PO QD FOR 2 WEEKS THEN 2 TS QD FOR 2 WEEKS THEN 3 TS QD THEREAFTER AS TOLERATED  5    zinc gluconate 50 mg tablet Take 15 mg by mouth once daily.       No current facility-administered medications on file prior to visit.       Review of patient's allergies indicates:   Allergen  Reactions    Wool Hives    Effexor [venlafaxine] Palpitations       Social History     Socioeconomic History    Marital status:    Occupational History     Employer: [employer]   Tobacco Use    Smoking status: Former Smoker     Packs/day: 0.00     Years: 3.00     Pack years: 0.00     Types: Cigarettes     Quit date: 10/15/1999     Years since quittin.6    Smokeless tobacco: Never Used   Substance and Sexual Activity    Alcohol use: Yes     Alcohol/week: 4.0 standard drinks     Types: 2 Glasses of wine, 2 Standard drinks or equivalent per week     Comment: social    Drug use: No    Sexual activity: Yes     Partners: Male     Birth control/protection: Partner-Vasectomy, Other-see comments     Comment: ablation   Social History Narrative    - 14 years    From [place]    Works for a law firm- - associates degree    3 children     close with mom       Family History   Problem Relation Age of Onset    Diabetes Father     Stroke Father     Kidney disease Father     Diabetes Mother     Hypertension Mother     Coronary artery disease Mother     Depression Mother     Seizures Mother     Arthritis Mother     Heart disease Mother     Breast cancer Maternal Aunt     Cancer Maternal Aunt     Miscarriages / Stillbirths Maternal Aunt     Breast cancer Maternal Aunt     Mental illness Paternal Grandmother     Mental illness Maternal Grandfather     Breast cancer Paternal Aunt     Ovarian cancer Other         maternal great aunt    COPD Maternal Aunt     Heart disease Brother     Colon cancer Neg Hx     Suicide Neg Hx        Review of Systems   Constitutional: Negative for appetite change, fever and unexpected weight change.   HENT: Positive for congestion, trouble swallowing and voice change. Negative for postnasal drip, rhinorrhea, sneezing and sore throat.    Eyes: Negative for visual disturbance.   Respiratory: Negative for cough, shortness of breath and wheezing.    Cardiovascular:  Negative for chest pain, palpitations and leg swelling.   Gastrointestinal: Negative for abdominal pain, blood in stool, constipation, diarrhea, nausea and vomiting.   Genitourinary: Negative for dysuria.   Musculoskeletal: Negative for arthralgias, joint swelling and myalgias.   Skin: Negative for color change, pallor and rash.   Neurological: Negative for weakness, light-headedness, numbness and headaches.   Hematological: Negative for adenopathy. Does not bruise/bleed easily.   Psychiatric/Behavioral: Negative for agitation.           Objective:   Vitals: There were no vitals filed for this visit.    Physical Exam Unable to perform due to video visit    IMPRESSION     Problem List Items Addressed This Visit    None     Visit Diagnoses     Dysphagia, unspecified type    -  Primary    Relevant Orders    Case Request Endoscopy: EGD (ESOPHAGOGASTRODUODENOSCOPY) (Completed)    Heartburn        Relevant Orders    Case Request Endoscopy: EGD (ESOPHAGOGASTRODUODENOSCOPY) (Completed)    Hoarseness of voice              PLANS:    - Start Protonix 40 mg po daily x 1 month  - Schedule for EGD for further evaluation  - Will have patient RTC in 1 month for f/u    Dysphagia, unspecified type  -     Case Request Endoscopy: EGD (ESOPHAGOGASTRODUODENOSCOPY)    Heartburn  -     Case Request Endoscopy: EGD (ESOPHAGOGASTRODUODENOSCOPY)    Hoarseness of voice    Other orders  -     pantoprazole (PROTONIX) 40 MG tablet; Take 1 tablet (40 mg total) by mouth once daily.  Dispense: 30 tablet; Refill: 3            Nohemi Ruiz MD  Gastroenterology and Hepatology       64.8

## 2022-06-16 NOTE — TELEPHONE ENCOUNTER
This message is for patient in regards to his/her appointment 06/17/22 at 10:30a   With Dr. Sully Palafox MD.        [facility] Policy: For the safety of all patients and staff members.     Patient Visitor policy: During this visit we're informing all patients that face mask are now optional, upon visit you have the options of requesting clinical staff to wear a mask for your protection and thiers.    If you have any questions or concerns please contact [phone]       also inquired IPM within message.    [facility] providers and Mid-levels offer interventional, procedure--based options to treat chronic pain. The goal is to manage chronic pain by reducing pain frequency and intensity and address your functional goals for activities of daily living while simultaneously reducing or eliminating your reliance on medications. Please bring any records or images that you have from prior treatments for your pain. You will be presented with multi-modal treatment plan that may or may not include imaging, interventional procedures, physical/occupational/aqua therapy, pain creams, and non-narcotic pain medications used for the treatments of chronic pain.    Staff confirmed sG

## 2022-06-17 ENCOUNTER — OFFICE VISIT (OUTPATIENT)
Dept: PAIN MEDICINE | Facility: CLINIC | Age: 47
End: 2022-06-17
Attending: FAMILY MEDICINE
Payer: COMMERCIAL

## 2022-06-17 VITALS
DIASTOLIC BLOOD PRESSURE: 77 MMHG | HEIGHT: 67 IN | BODY MASS INDEX: 22.13 KG/M2 | TEMPERATURE: 97 F | WEIGHT: 141 LBS | OXYGEN SATURATION: 99 % | RESPIRATION RATE: 18 BRPM | HEART RATE: 69 BPM | SYSTOLIC BLOOD PRESSURE: 110 MMHG

## 2022-06-17 DIAGNOSIS — F41.9 ANXIETY: ICD-10-CM

## 2022-06-17 DIAGNOSIS — G43.909 MIGRAINE WITHOUT STATUS MIGRAINOSUS, NOT INTRACTABLE, UNSPECIFIED MIGRAINE TYPE: Primary | ICD-10-CM

## 2022-06-17 PROCEDURE — 3078F PR MOST RECENT DIASTOLIC BLOOD PRESSURE < 80 MM HG: ICD-10-PCS | Mod: CPTII,S$GLB,, | Performed by: ANESTHESIOLOGY

## 2022-06-17 PROCEDURE — 3074F SYST BP LT 130 MM HG: CPT | Mod: CPTII,S$GLB,, | Performed by: ANESTHESIOLOGY

## 2022-06-17 PROCEDURE — 99999 PR PBB SHADOW E&M-EST. PATIENT-LVL IV: CPT | Mod: PBBFAC,,, | Performed by: ANESTHESIOLOGY

## 2022-06-17 PROCEDURE — 99999 PR PBB SHADOW E&M-EST. PATIENT-LVL IV: ICD-10-PCS | Mod: PBBFAC,,, | Performed by: ANESTHESIOLOGY

## 2022-06-17 PROCEDURE — 3008F PR BODY MASS INDEX (BMI) DOCUMENTED: ICD-10-PCS | Mod: CPTII,S$GLB,, | Performed by: ANESTHESIOLOGY

## 2022-06-17 PROCEDURE — 3044F PR MOST RECENT HEMOGLOBIN A1C LEVEL <7.0%: ICD-10-PCS | Mod: CPTII,S$GLB,, | Performed by: ANESTHESIOLOGY

## 2022-06-17 PROCEDURE — 99204 OFFICE O/P NEW MOD 45 MIN: CPT | Mod: S$GLB,,, | Performed by: ANESTHESIOLOGY

## 2022-06-17 PROCEDURE — 99204 PR OFFICE/OUTPT VISIT, NEW, LEVL IV, 45-59 MIN: ICD-10-PCS | Mod: S$GLB,,, | Performed by: ANESTHESIOLOGY

## 2022-06-17 PROCEDURE — 3008F BODY MASS INDEX DOCD: CPT | Mod: CPTII,S$GLB,, | Performed by: ANESTHESIOLOGY

## 2022-06-17 PROCEDURE — 3078F DIAST BP <80 MM HG: CPT | Mod: CPTII,S$GLB,, | Performed by: ANESTHESIOLOGY

## 2022-06-17 PROCEDURE — 3044F HG A1C LEVEL LT 7.0%: CPT | Mod: CPTII,S$GLB,, | Performed by: ANESTHESIOLOGY

## 2022-06-17 PROCEDURE — 1159F PR MEDICATION LIST DOCUMENTED IN MEDICAL RECORD: ICD-10-PCS | Mod: CPTII,S$GLB,, | Performed by: ANESTHESIOLOGY

## 2022-06-17 PROCEDURE — 1159F MED LIST DOCD IN RCRD: CPT | Mod: CPTII,S$GLB,, | Performed by: ANESTHESIOLOGY

## 2022-06-17 PROCEDURE — 3074F PR MOST RECENT SYSTOLIC BLOOD PRESSURE < 130 MM HG: ICD-10-PCS | Mod: CPTII,S$GLB,, | Performed by: ANESTHESIOLOGY

## 2022-06-17 RX ORDER — PROPRANOLOL HYDROCHLORIDE 40 MG/1
40 TABLET ORAL DAILY
Qty: 30 TABLET | Refills: 0 | Status: SHIPPED | OUTPATIENT
Start: 2022-06-17 | End: 2022-07-01 | Stop reason: SDUPTHER

## 2022-06-17 NOTE — PROGRESS NOTES
PCP: Jf Kuo MD    REFERRING PHYSICIAN: Jf Kuo.*    CHIEF COMPLAINT: migraines    Original HISTORY OF PRESENT ILLNESS: Lynne Velasco presents to the clinic for the evaluation of the above pain. The pain started many years ago.    Original Pain Description:  Headaches are associated with nausea, vomitting, photophobia, phonophobia. The pain is described as aching and throbbing. Radiates behind the eye. Occasionally starts unilaterally but typically evolves to involve whole head throbbing. Exacerbating factors: afternoon. Does have headaches on weekend afternoons but not as severe as weekday headaches. Mitigating factors medications and rest. Symptoms interfere with daily activity and work. The patient feels like symptoms have been worsening. Increasing in frequency and severity. Now occur almost daily around 2pm. Patient denies urinary incontinence, bowel incontinence, significant weight loss, significant motor weakness and ataxia..    Saw [facility] on Zac Gant MD 11/2/2020. Diagnosed with mixed migraine and tension headache with exacerbation by sleep disturbance. Encouraged to follow up with PT to discuss dry needling for migraines. Started on Elavil prn, Mg supplement, imitrex prn, and voltaren prn.  Patient did try to schedule follow up with neurology but was told Dr. Almanza no longer practices here.  Patient takes Elavil nightly, Mg supplement, sumatriptan for abortive treatment. Cannot use sumatriptan at work due to drowsiness.     Original PAIN SCORES:  Best: Pain is 0  Worst: Pain is 10  Usually: Pain is 5  Current: Pain is 0    INTERVAL HISTORY:     6 weeks of Conservative therapy (PT/Chiro/Home Exercises with Dates)  Physical therapy for migraines. Has tried PT in the past.    Treatments / Medications: (Ice/Heat/NSAIDS/APAP/etc):  Elavil, nightly  Mg supplement  sumatriptan prn  ibuprofen     Report:    Interventional Pain Procedures:  (Previous injections)  Occipital nerve blocks , good relief     Past Medical History:   Diagnosis Date    Abnormal Pap smear     cryo for dysplasia    Anxiety     Depression     History of psychiatric care     prozac, lexapro, ativan    Mental disorder     Depression    Neuromuscular disorder     Psychiatric problem     Therapy      Past Surgical History:   Procedure Laterality Date    AUGMENTATION OF BREAST      saline,     Breast implants 1998    BREAST SURGERY  ,     implants    COSMETIC SURGERY  2010    breast implants    Cryo       dental implant      DILATION AND CURETTAGE OF UTERUS      DILATION AND CURETTAGE OF UTERUS      ENDOMETRIAL ABLATION      Hysteroscopy with Novasure endometrial ablation    EYE SURGERY      lasik    HYSTERECTOMY      ROBOT-ASSISTED LAPAROSCOPIC HYSTERECTOMY N/A 2020    Procedure: ROBOTIC HYSTERECTOMY;  Surgeon: Juliane Mcdonnell MD;  Location: [facility];  Service: OB/GYN;  Laterality: N/A;    ROBOT-ASSISTED SURGICAL REMOVAL OF FALLOPIAN TUBE USING DA NAV XI Bilateral 2020    Procedure: XI ROBOTIC SALPINGECTOMY;  Surgeon: Juliane Mcdonnell MD;  Location: [facility];  Service: OB/GYN;  Laterality: Bilateral;     Social History     Socioeconomic History    Marital status:    Occupational History     Employer: [employer]   Tobacco Use    Smoking status: Former Smoker     Packs/day: 0.00     Years: 3.00     Pack years: 0.00     Types: Cigarettes     Quit date: 10/15/1999     Years since quittin.6    Smokeless tobacco: Never Used   Substance and Sexual Activity    Alcohol use: Yes     Alcohol/week: 4.0 standard drinks     Types: 2 Glasses of wine, 2 Standard drinks or equivalent per week     Comment: social    Drug use: No    Sexual activity: Yes     Partners: Male     Birth control/protection: Partner-Vasectomy, Other-see comments     Comment: ablation   Social History Narrative    - 14 years     From [place]    Works for a law firm- - associates degree    3 children     close with mom     Family History   Problem Relation Age of Onset    Diabetes Father     Stroke Father     Kidney disease Father     Diabetes Mother     Hypertension Mother     Coronary artery disease Mother     Depression Mother     Seizures Mother     Arthritis Mother     Heart disease Mother     Breast cancer Maternal Aunt     Cancer Maternal Aunt     Miscarriages / Stillbirths Maternal Aunt     Breast cancer Maternal Aunt     Mental illness Paternal Grandmother     Mental illness Maternal Grandfather     Breast cancer Paternal Aunt     Ovarian cancer Other         maternal great aunt    COPD Maternal Aunt     Heart disease Brother     Colon cancer Neg Hx     Suicide Neg Hx        Review of patient's allergies indicates:   Allergen Reactions    Wool Hives    Effexor [venlafaxine] Palpitations       Current Outpatient Medications   Medication Sig    amitriptyline (ELAVIL) 50 MG tablet Take 1 tablet (50 mg total) by mouth nightly as needed for Insomnia.    calcium-vitamin D3 (OS-MANJULA 500 + D3) 500 mg-5 mcg (200 unit) per tablet Take 1 tablet by mouth 2 (two) times daily with meals.    EScitalopram oxalate (LEXAPRO) 10 MG tablet Take 1 tablet (10 mg total) by mouth once daily.    finasteride (PROSCAR) 5 mg tablet TK 1 T PO QD    minoxidiL (LONITEN) 10 MG Tab Take 2.5 mg by mouth once daily. Half pill daily    pantoprazole (PROTONIX) 40 MG tablet Take 1 tablet (40 mg total) by mouth once daily.    spironolactone (ALDACTONE) 50 MG tablet TK 1 T PO QD FOR 2 WEEKS THEN 2 TS QD FOR 2 WEEKS THEN 3 TS QD THEREAFTER AS TOLERATED    zinc gluconate 50 mg tablet Take 15 mg by mouth once daily.    LORazepam (ATIVAN) 0.5 MG tablet lorazepam 0.5 mg tablet    melatonin 10 mg Cap Take by mouth.    metroNIDAZOLE (FLAGYL) 500 MG tablet Take 1 tablet (500 mg total) by mouth 2 (two) times daily. (Patient not taking: No  "sig reported)     No current facility-administered medications for this visit.       ROS:  GENERAL: No fever. No chills. No fatigue. Denies weight loss. Denies weight gain.  HEENT: +Denies headaches. Denies vision change. Denies eye pain. Denies double vision. Denies ear pain.   CV: Denies chest pain.   PULM: Denies of shortness of breath.  GI: Denies constipation. No diarrhea. No abdominal pain. Denies nausea. Denies vomiting. No blood in stool.  HEME: Denies bleeding problems.  : Denies urgency. No painful urination. No blood in urine.  MS: Denies joint stiffness. Denies joint swelling.  Denies back pain.  SKIN: Denies rash.   NEURO: Denies seizures. No weakness.  PSYCH:  Denies difficulty sleeping. No anxiety. Denies depression. No suicidal thoughts.       VITALS:   Vitals:    06/17/22 1044   BP: 110/77   Pulse: 69   Resp: 18   Temp: 97.2 °F (36.2 °C)   SpO2: 99%   Weight: 64 kg (141 lb)   Height: 5' 7" (1.702 m)   PainSc:   4   PainLoc: Head         PHYSICAL EXAM:   GENERAL: Well appearing, in no acute distress, alert and oriented x3.  PSYCH:  Mood and affect appropriate.  SKIN: Skin color, texture, turgor normal, no rashes or lesions.  HEENT:  Normocephalic, atraumatic. Cranial nerves grossly intact.  NECK: No pain to palpation over the cervical paraspinous muscles. No pain to palpation over facets. No pain with neck flexion, extension, or lateral flexion. Minimally tender over the base of skull R>L. Unable to reproduce headache pain with palpation of occipital nerve.  PULM: No evidence of respiratory difficulty, symmetric chest rise.  GI:  Non-distended  BACK: Normal range of motion.  EXTREMITIES: No deformities, edema, or skin discoloration.   MUSCULOSKELETAL: Bilateral upper and lower extremity strength is normal and symmetric. No atrophy is noted.  NEURO: Sensation is equal and appropriate bilaterally. Bilateral upper and lower extremity coordination and muscle stretch reflexes are physiologic and " symmetric.   GAIT: normal.      ASSESSMENT: 46 y.o. year old female with pain, consistent with migraine headaches.    DISCUSSION: Ms. Velasco has a history of migraine headaches. She previously saw Neuro and was diagnosed with mixed tension/migtaine headaches and has been treated with Elavil QHS and Imitrex for rescue. She presents with now daily migraines. We will work on preventative therapies.     PLAN:  1. Trial preventative medication: propranolol 40mg daily. Advised patient to take medication around Noon/1pm to prevent typical 2pm migraines.  2. Consider botox for migraine headaches next  3. Continue abortive medications as needed  4. RTC in 2 weeks to discuss medication efficacy  5. Will refer back to Neuro as needed.       I would like to thank Jf Kuo for the opportunity to assist in the care of this patient. We had a very nice visit and I look forward to continuing their care. Please let me know if I can be of further assistance.     Elian Caputo  06/17/2022

## 2022-06-30 ENCOUNTER — TELEPHONE (OUTPATIENT)
Dept: PAIN MEDICINE | Facility: CLINIC | Age: 47
End: 2022-06-30
Payer: COMMERCIAL

## 2022-06-30 NOTE — TELEPHONE ENCOUNTER
This message is for patient in regards to his/her appointment 07/01/22 at 1:20p  With Quinn Sosa NP      [facility] Policy: For the safety of all patients and staff members.     Patient Visitor policy: During this visit we're informing all patients that face mask are now optional, upon visit you have the options of requesting clinical staff to wear a mask for your protection and thiers.      If you have any questions or concerns please contact [phone]

## 2022-07-01 ENCOUNTER — OFFICE VISIT (OUTPATIENT)
Dept: PAIN MEDICINE | Facility: CLINIC | Age: 47
End: 2022-07-01
Payer: COMMERCIAL

## 2022-07-01 VITALS
HEIGHT: 67 IN | HEART RATE: 73 BPM | SYSTOLIC BLOOD PRESSURE: 113 MMHG | TEMPERATURE: 98 F | WEIGHT: 147 LBS | BODY MASS INDEX: 23.07 KG/M2 | RESPIRATION RATE: 18 BRPM | DIASTOLIC BLOOD PRESSURE: 76 MMHG

## 2022-07-01 DIAGNOSIS — G43.909 MIGRAINE WITHOUT STATUS MIGRAINOSUS, NOT INTRACTABLE, UNSPECIFIED MIGRAINE TYPE: ICD-10-CM

## 2022-07-01 PROCEDURE — 99999 PR PBB SHADOW E&M-EST. PATIENT-LVL IV: CPT | Mod: PBBFAC,,, | Performed by: NURSE PRACTITIONER

## 2022-07-01 PROCEDURE — 3008F PR BODY MASS INDEX (BMI) DOCUMENTED: ICD-10-PCS | Mod: CPTII,S$GLB,, | Performed by: NURSE PRACTITIONER

## 2022-07-01 PROCEDURE — 3078F PR MOST RECENT DIASTOLIC BLOOD PRESSURE < 80 MM HG: ICD-10-PCS | Mod: CPTII,S$GLB,, | Performed by: NURSE PRACTITIONER

## 2022-07-01 PROCEDURE — 3044F PR MOST RECENT HEMOGLOBIN A1C LEVEL <7.0%: ICD-10-PCS | Mod: CPTII,S$GLB,, | Performed by: NURSE PRACTITIONER

## 2022-07-01 PROCEDURE — 1160F RVW MEDS BY RX/DR IN RCRD: CPT | Mod: CPTII,S$GLB,, | Performed by: NURSE PRACTITIONER

## 2022-07-01 PROCEDURE — 99999 PR PBB SHADOW E&M-EST. PATIENT-LVL IV: ICD-10-PCS | Mod: PBBFAC,,, | Performed by: NURSE PRACTITIONER

## 2022-07-01 PROCEDURE — 3074F PR MOST RECENT SYSTOLIC BLOOD PRESSURE < 130 MM HG: ICD-10-PCS | Mod: CPTII,S$GLB,, | Performed by: NURSE PRACTITIONER

## 2022-07-01 PROCEDURE — 99213 PR OFFICE/OUTPT VISIT, EST, LEVL III, 20-29 MIN: ICD-10-PCS | Mod: S$GLB,,, | Performed by: NURSE PRACTITIONER

## 2022-07-01 PROCEDURE — 3074F SYST BP LT 130 MM HG: CPT | Mod: CPTII,S$GLB,, | Performed by: NURSE PRACTITIONER

## 2022-07-01 PROCEDURE — 1160F PR REVIEW ALL MEDS BY PRESCRIBER/CLIN PHARMACIST DOCUMENTED: ICD-10-PCS | Mod: CPTII,S$GLB,, | Performed by: NURSE PRACTITIONER

## 2022-07-01 PROCEDURE — 1159F MED LIST DOCD IN RCRD: CPT | Mod: CPTII,S$GLB,, | Performed by: NURSE PRACTITIONER

## 2022-07-01 PROCEDURE — 99213 OFFICE O/P EST LOW 20 MIN: CPT | Mod: S$GLB,,, | Performed by: NURSE PRACTITIONER

## 2022-07-01 PROCEDURE — 1159F PR MEDICATION LIST DOCUMENTED IN MEDICAL RECORD: ICD-10-PCS | Mod: CPTII,S$GLB,, | Performed by: NURSE PRACTITIONER

## 2022-07-01 PROCEDURE — 3008F BODY MASS INDEX DOCD: CPT | Mod: CPTII,S$GLB,, | Performed by: NURSE PRACTITIONER

## 2022-07-01 PROCEDURE — 3078F DIAST BP <80 MM HG: CPT | Mod: CPTII,S$GLB,, | Performed by: NURSE PRACTITIONER

## 2022-07-01 PROCEDURE — 3044F HG A1C LEVEL LT 7.0%: CPT | Mod: CPTII,S$GLB,, | Performed by: NURSE PRACTITIONER

## 2022-07-01 RX ORDER — PROPRANOLOL HYDROCHLORIDE 40 MG/1
40 TABLET ORAL DAILY
Qty: 60 TABLET | Refills: 1 | Status: SHIPPED | OUTPATIENT
Start: 2022-07-01 | End: 2022-10-07

## 2022-07-01 NOTE — PROGRESS NOTES
PCP: Jf Kuo MD    REFERRING PHYSICIAN: No ref. provider found    CHIEF COMPLAINT: migraines    Interval History 7/1/2022:  Mrs Velasco presents for follow up. She was started on Propanolol 40mg qd over interval. She has had a decrease in HA to q5 days. When they occur they are just as severe however and appear to be primarily in evening time. She is taking propanolol in a.m.. Location is several places, at times it is frontal, can be temporal, and worse are when originating at base of skull and radiating around scalp.     Original HISTORY OF PRESENT ILLNESS: Lynne Velasco presents to the clinic for the evaluation of the above pain. The pain started many years ago.    Original Pain Description:  Headaches are associated with nausea, vomitting, photophobia, phonophobia. The pain is described as aching and throbbing. Radiates behind the eye. Occasionally starts unilaterally but typically evolves to involve whole head throbbing. Exacerbating factors: afternoon. Does have headaches on weekend afternoons but not as severe as weekday headaches. Mitigating factors medications and rest. Symptoms interfere with daily activity and work. The patient feels like symptoms have been worsening. Increasing in frequency and severity. Now occur almost daily around 2pm. Patient denies urinary incontinence, bowel incontinence, significant weight loss, significant motor weakness and ataxia..    Saw [facility] on Zac Gant MD 11/2/2020. Diagnosed with mixed migraine and tension headache with exacerbation by sleep disturbance. Encouraged to follow up with PT to discuss dry needling for migraines. Started on Elavil prn, Mg supplement, imitrex prn, and voltaren prn.  Patient did try to schedule follow up with neurology but was told Dr. Almanza no longer practices here.  Patient takes Elavil nightly, Mg supplement, sumatriptan for abortive treatment. Cannot use sumatriptan at work due to  drowsiness.     Original PAIN SCORES:  Best: Pain is 0  Worst: Pain is 10  Usually: Pain is 5  Current: Pain is 0    INTERVAL HISTORY:     6 weeks of Conservative therapy (PT/Chiro/Home Exercises with Dates)  Physical therapy for migraines. Has tried PT in the past.    Treatments / Medications: (Ice/Heat/NSAIDS/APAP/etc):  Elavil, nightly  Mg supplement  sumatriptan prn  ibuprofen     Report:    Interventional Pain Procedures: (Previous injections)  Occipital nerve blocks 2014, good relief     Past Medical History:   Diagnosis Date    Abnormal Pap smear 1993    cryo for dysplasia    Anxiety     Depression     History of psychiatric care     prozac, lexapro, ativan    Mental disorder     Depression    Neuromuscular disorder     Psychiatric problem     Therapy      Past Surgical History:   Procedure Laterality Date    AUGMENTATION OF BREAST      saline, 2010    Breast implants 1998 1998    BREAST SURGERY  1999, 2010    implants    COSMETIC SURGERY  1999, 2010    breast implants    Cryo 1993      dental implant      DILATION AND CURETTAGE OF UTERUS      DILATION AND CURETTAGE OF UTERUS      ENDOMETRIAL ABLATION  July 11,2014    Hysteroscopy with Novasure endometrial ablation    EYE SURGERY      lasik    HYSTERECTOMY      ROBOT-ASSISTED LAPAROSCOPIC HYSTERECTOMY N/A 12/7/2020    Procedure: ROBOTIC HYSTERECTOMY;  Surgeon: Juliane Mcdonnell MD;  Location: [facility];  Service: OB/GYN;  Laterality: N/A;    ROBOT-ASSISTED SURGICAL REMOVAL OF FALLOPIAN TUBE USING DA NAV XI Bilateral 12/7/2020    Procedure: XI ROBOTIC SALPINGECTOMY;  Surgeon: Juliane Mcdonnell MD;  Location: [facility];  Service: OB/GYN;  Laterality: Bilateral;     Social History     Socioeconomic History    Marital status:    Occupational History     Employer: [employer]   Tobacco Use    Smoking status: Former Smoker     Packs/day: 0.00     Years: 3.00     Pack years: 0.00     Types: Cigarettes     Quit date: 10/15/1999     Years since  quittin.7    Smokeless tobacco: Never Used   Substance and Sexual Activity    Alcohol use: Yes     Alcohol/week: 4.0 standard drinks     Types: 2 Glasses of wine, 2 Standard drinks or equivalent per week     Comment: social    Drug use: No    Sexual activity: Yes     Partners: Male     Birth control/protection: Partner-Vasectomy, Other-see comments     Comment: ablation   Social History Narrative    - 14 years    From [place]    Works for a law firm- - associates degree    3 children     close with mom     Family History   Problem Relation Age of Onset    Diabetes Father     Stroke Father     Kidney disease Father     Diabetes Mother     Hypertension Mother     Coronary artery disease Mother     Depression Mother     Seizures Mother     Arthritis Mother     Heart disease Mother     Breast cancer Maternal Aunt     Cancer Maternal Aunt     Miscarriages / Stillbirths Maternal Aunt     Breast cancer Maternal Aunt     Mental illness Paternal Grandmother     Mental illness Maternal Grandfather     Breast cancer Paternal Aunt     Ovarian cancer Other         maternal great aunt    COPD Maternal Aunt     Heart disease Brother     Colon cancer Neg Hx     Suicide Neg Hx        Review of patient's allergies indicates:   Allergen Reactions    Wool Hives    Effexor [venlafaxine] Palpitations       Current Outpatient Medications   Medication Sig    amitriptyline (ELAVIL) 50 MG tablet Take 1 tablet (50 mg total) by mouth nightly as needed for Insomnia.    calcium-vitamin D3 (OS-MANJULA 500 + D3) 500 mg-5 mcg (200 unit) per tablet Take 1 tablet by mouth 2 (two) times daily with meals.    EScitalopram oxalate (LEXAPRO) 10 MG tablet Take 1 tablet (10 mg total) by mouth once daily.    finasteride (PROSCAR) 5 mg tablet TK 1 T PO QD    LORazepam (ATIVAN) 0.5 MG tablet lorazepam 0.5 mg tablet    melatonin 10 mg Cap Take by mouth.    metroNIDAZOLE (FLAGYL) 500 MG tablet Take 1 tablet (500  "mg total) by mouth 2 (two) times daily.    minoxidiL (LONITEN) 10 MG Tab Take 2.5 mg by mouth once daily. Half pill daily    pantoprazole (PROTONIX) 40 MG tablet Take 1 tablet (40 mg total) by mouth once daily.    propranoloL (INDERAL) 40 MG tablet Take 1 tablet (40 mg total) by mouth once daily at 6am.    spironolactone (ALDACTONE) 50 MG tablet TK 1 T PO QD FOR 2 WEEKS THEN 2 TS QD FOR 2 WEEKS THEN 3 TS QD THEREAFTER AS TOLERATED    zinc gluconate 50 mg tablet Take 15 mg by mouth once daily.     No current facility-administered medications for this visit.       ROS:  GENERAL: No fever. No chills. No fatigue. Denies weight loss. Denies weight gain.  HEENT: +Denies headaches. Denies vision change. Denies eye pain. Denies double vision. Denies ear pain.   CV: Denies chest pain.   PULM: Denies of shortness of breath.  GI: Denies constipation. No diarrhea. No abdominal pain. Denies nausea. Denies vomiting. No blood in stool.  HEME: Denies bleeding problems.  : Denies urgency. No painful urination. No blood in urine.  MS: Denies joint stiffness. Denies joint swelling.  Denies back pain.  SKIN: Denies rash.   NEURO: Denies seizures. No weakness.  PSYCH:  Denies difficulty sleeping. No anxiety. Denies depression. No suicidal thoughts.       VITALS:   Vitals:    07/01/22 1337   BP: 113/76   Pulse: 73   Resp: 18   Temp: 97.7 °F (36.5 °C)   TempSrc: Temporal   Weight: 66.7 kg (147 lb)   Height: 5' 7" (1.702 m)   PainSc:   3   PainLoc: Head     PHYSICAL EXAMINATION:  Voice normal.  Normal affect without evidence of distress.      ASSESSMENT: 46 y.o. year old female with pain, consistent with migraine headaches.    DISCUSSION: Ms. Velasco has a history of migraine headaches. She previously saw Neuro and was diagnosed with mixed tension/migtaine headaches and has been treated with Elavil QHS and Imitrex for rescue. She presents with now daily migraines. We will work on preventative therapies.     PLAN:    - Prior " records reviewed  - Overall improved with addition of propanolol  - Will increase to Propanolol 40mg BID. Can consider increase vs interventions pending benefit at next visit  - She will trial BID propanolol. Will consider escilation vs neurology referral   - Can consider Occipital nerve block vs C3,4, TON MBB but pain is not focally located to this area at this time  - RTC she will reach out in approx 4 weeks to discuss symptoms/med mgt.       Quinn Sosa  07/01/2022

## 2022-07-27 ENCOUNTER — PATIENT MESSAGE (OUTPATIENT)
Dept: UROGYNECOLOGY | Facility: CLINIC | Age: 47
End: 2022-07-27
Payer: COMMERCIAL

## 2022-07-27 DIAGNOSIS — Z12.31 SCREENING MAMMOGRAM FOR BREAST CANCER: Primary | ICD-10-CM

## 2022-08-22 ENCOUNTER — APPOINTMENT (OUTPATIENT)
Dept: RADIOLOGY | Facility: OTHER | Age: 47
End: 2022-08-22
Attending: PHYSICIAN ASSISTANT
Payer: COMMERCIAL

## 2022-08-22 VITALS — WEIGHT: 147.06 LBS | HEIGHT: 67 IN | BODY MASS INDEX: 23.08 KG/M2

## 2022-08-22 DIAGNOSIS — Z12.31 SCREENING MAMMOGRAM FOR BREAST CANCER: ICD-10-CM

## 2022-08-22 PROCEDURE — 77063 BREAST TOMOSYNTHESIS BI: CPT | Mod: 26,,, | Performed by: RADIOLOGY

## 2022-08-22 PROCEDURE — 77067 MAMMO DIGITAL SCREENING BILAT WITH TOMO: ICD-10-PCS | Mod: 26,,, | Performed by: RADIOLOGY

## 2022-08-22 PROCEDURE — 77063 MAMMO DIGITAL SCREENING BILAT WITH TOMO: ICD-10-PCS | Mod: 26,,, | Performed by: RADIOLOGY

## 2022-08-22 PROCEDURE — 77067 SCR MAMMO BI INCL CAD: CPT | Mod: TC,PN

## 2022-08-22 PROCEDURE — 77067 SCR MAMMO BI INCL CAD: CPT | Mod: 26,,, | Performed by: RADIOLOGY

## 2022-08-22 PROCEDURE — 77063 BREAST TOMOSYNTHESIS BI: CPT | Mod: TC,PN

## 2022-09-13 ENCOUNTER — PATIENT OUTREACH (OUTPATIENT)
Dept: ADMINISTRATIVE | Facility: HOSPITAL | Age: 47
End: 2022-09-13
Payer: COMMERCIAL

## 2022-09-15 ENCOUNTER — PATIENT OUTREACH (OUTPATIENT)
Dept: ADMINISTRATIVE | Facility: HOSPITAL | Age: 47
End: 2022-09-15
Payer: COMMERCIAL

## 2022-10-28 ENCOUNTER — PATIENT MESSAGE (OUTPATIENT)
Dept: PAIN MEDICINE | Facility: CLINIC | Age: 47
End: 2022-10-28
Payer: COMMERCIAL

## 2022-11-01 ENCOUNTER — TELEPHONE (OUTPATIENT)
Dept: PAIN MEDICINE | Facility: CLINIC | Age: 47
End: 2022-11-01
Payer: COMMERCIAL

## 2022-11-01 NOTE — TELEPHONE ENCOUNTER
This message is for patient in regards to his/her appointment 11/02/22 at 9:15 am   With Dr. Sully Palafox MD.      [facility] Policy: For the safety of all patients and staff members.   During this visit we're informing all patients and visitors to wear face mask at all time here at [facility].  If you have any questions or concerns please contact [phone]

## 2022-11-02 ENCOUNTER — TELEPHONE (OUTPATIENT)
Dept: PAIN MEDICINE | Facility: CLINIC | Age: 47
End: 2022-11-02
Payer: COMMERCIAL

## 2022-11-02 ENCOUNTER — OFFICE VISIT (OUTPATIENT)
Dept: PAIN MEDICINE | Facility: CLINIC | Age: 47
End: 2022-11-02
Payer: COMMERCIAL

## 2022-11-02 VITALS
TEMPERATURE: 98 F | WEIGHT: 147.69 LBS | BODY MASS INDEX: 22.38 KG/M2 | DIASTOLIC BLOOD PRESSURE: 72 MMHG | HEART RATE: 68 BPM | SYSTOLIC BLOOD PRESSURE: 102 MMHG | HEIGHT: 68 IN | RESPIRATION RATE: 19 BRPM

## 2022-11-02 DIAGNOSIS — G43.709 CHRONIC MIGRAINE WITHOUT AURA WITHOUT STATUS MIGRAINOSUS, NOT INTRACTABLE: Primary | ICD-10-CM

## 2022-11-02 DIAGNOSIS — G43.909 MIGRAINE WITHOUT STATUS MIGRAINOSUS, NOT INTRACTABLE, UNSPECIFIED MIGRAINE TYPE: Primary | ICD-10-CM

## 2022-11-02 DIAGNOSIS — F41.9 ANXIETY: ICD-10-CM

## 2022-11-02 PROCEDURE — 1159F PR MEDICATION LIST DOCUMENTED IN MEDICAL RECORD: ICD-10-PCS | Mod: CPTII,S$GLB,, | Performed by: ANESTHESIOLOGY

## 2022-11-02 PROCEDURE — 99999 PR PBB SHADOW E&M-EST. PATIENT-LVL III: ICD-10-PCS | Mod: PBBFAC,,, | Performed by: ANESTHESIOLOGY

## 2022-11-02 PROCEDURE — 99999 PR PBB SHADOW E&M-EST. PATIENT-LVL III: CPT | Mod: PBBFAC,,, | Performed by: ANESTHESIOLOGY

## 2022-11-02 PROCEDURE — 3008F BODY MASS INDEX DOCD: CPT | Mod: CPTII,S$GLB,, | Performed by: ANESTHESIOLOGY

## 2022-11-02 PROCEDURE — 3074F SYST BP LT 130 MM HG: CPT | Mod: CPTII,S$GLB,, | Performed by: ANESTHESIOLOGY

## 2022-11-02 PROCEDURE — 1160F RVW MEDS BY RX/DR IN RCRD: CPT | Mod: CPTII,S$GLB,, | Performed by: ANESTHESIOLOGY

## 2022-11-02 PROCEDURE — 99214 PR OFFICE/OUTPT VISIT, EST, LEVL IV, 30-39 MIN: ICD-10-PCS | Mod: S$GLB,,, | Performed by: ANESTHESIOLOGY

## 2022-11-02 PROCEDURE — 3078F DIAST BP <80 MM HG: CPT | Mod: CPTII,S$GLB,, | Performed by: ANESTHESIOLOGY

## 2022-11-02 PROCEDURE — 99214 OFFICE O/P EST MOD 30 MIN: CPT | Mod: S$GLB,,, | Performed by: ANESTHESIOLOGY

## 2022-11-02 PROCEDURE — 3008F PR BODY MASS INDEX (BMI) DOCUMENTED: ICD-10-PCS | Mod: CPTII,S$GLB,, | Performed by: ANESTHESIOLOGY

## 2022-11-02 PROCEDURE — 3044F HG A1C LEVEL LT 7.0%: CPT | Mod: CPTII,S$GLB,, | Performed by: ANESTHESIOLOGY

## 2022-11-02 PROCEDURE — 3074F PR MOST RECENT SYSTOLIC BLOOD PRESSURE < 130 MM HG: ICD-10-PCS | Mod: CPTII,S$GLB,, | Performed by: ANESTHESIOLOGY

## 2022-11-02 PROCEDURE — 1159F MED LIST DOCD IN RCRD: CPT | Mod: CPTII,S$GLB,, | Performed by: ANESTHESIOLOGY

## 2022-11-02 PROCEDURE — 1160F PR REVIEW ALL MEDS BY PRESCRIBER/CLIN PHARMACIST DOCUMENTED: ICD-10-PCS | Mod: CPTII,S$GLB,, | Performed by: ANESTHESIOLOGY

## 2022-11-02 PROCEDURE — 3078F PR MOST RECENT DIASTOLIC BLOOD PRESSURE < 80 MM HG: ICD-10-PCS | Mod: CPTII,S$GLB,, | Performed by: ANESTHESIOLOGY

## 2022-11-02 PROCEDURE — 3044F PR MOST RECENT HEMOGLOBIN A1C LEVEL <7.0%: ICD-10-PCS | Mod: CPTII,S$GLB,, | Performed by: ANESTHESIOLOGY

## 2022-11-02 RX ORDER — PROPRANOLOL HYDROCHLORIDE 40 MG/1
40 TABLET ORAL 2 TIMES DAILY
Qty: 60 TABLET | Refills: 2 | Status: SHIPPED | OUTPATIENT
Start: 2022-11-02 | End: 2023-10-04 | Stop reason: SDUPTHER

## 2022-11-02 NOTE — PROGRESS NOTES
PCP: Jf Kuo MD    REFERRING PHYSICIAN: No ref. provider found    CHIEF COMPLAINT: migraines    Original HISTORY OF PRESENT ILLNESS: Lynne Velasco presents to the clinic for the evaluation of the above pain. The pain started many years ago.    Original Pain Description:  Headaches are associated with nausea, vomitting, photophobia, phonophobia. The pain is described as aching and throbbing. Radiates behind the eye. Occasionally starts unilaterally but typically evolves to involve whole head throbbing. Exacerbating factors: afternoon. Does have headaches on weekend afternoons but not as severe as weekday headaches. Mitigating factors medications and rest. Symptoms interfere with daily activity and work. The patient feels like symptoms have been worsening. Increasing in frequency and severity. Now occur almost daily around 2pm. Patient denies urinary incontinence, bowel incontinence, significant weight loss, significant motor weakness and ataxia..    Saw [facility] on Zac Gant MD 11/2/2020. Diagnosed with mixed migraine and tension headache with exacerbation by sleep disturbance. Encouraged to follow up with PT to discuss dry needling for migraines. Started on Elavil prn, Mg supplement, imitrex prn, and voltaren prn.  Patient did try to schedule follow up with neurology but was told Dr. Almanza no longer practices here.  Patient takes Elavil nightly, Mg supplement, sumatriptan for abortive treatment. Cannot use sumatriptan at work due to drowsiness.     Original PAIN SCORES:  Best: Pain is 0  Worst: Pain is 10  Usually: Pain is 5  Current: Pain is 0    INTERVAL HISTORY:   Interval History 7/1/2022: Mrs Velasco presents for follow up. She was started on Propanolol 40mg qd over interval. She has had a decrease in HA to q5 days. When they occur they are just as severe however and appear to be primarily in evening time. She is taking propanolol in a.m.. Location is  several places, at times it is frontal, can be temporal, and worse are when originating at base of skull and radiating around scalp.     Interval History 11/2/2022: Since LCV 7/1/2022, the patient did not increase her inderal and feels the migraines have increased in frequency.  The migraines are slightly worse compared to her previous visit. The migraines are described as a sharp pain located in the parietotemporal area on the right and anterior to the left eye. Light makes the pain worse. She has to lay down for about an 1.5 hours for the HA to improve. She is having migraines at least 4 days per week and 16 days per month. She typically gets the migraines at work. Patient said that she is taking sumatriptan as abortive treatment when severe.  She is sleeping about 4.5 hours nightly and has difficulty falling asleep and staying asleep. She wakes up about 4 times nightly due to thoughts and insomnia. She is no longer taking magnesium.  She is working as a  still and has limitations due to migraine.    6 weeks of Conservative therapy (PT/Chiro/Home Exercises with Dates)  Physical therapy for migraines. Has tried PT in the past.    Treatments / Medications: (Ice/Heat/NSAIDS/APAP/etc):  Elavil, nightly  Mg supplement  sumatriptan prn  Ibuprofen    Migraine medications tried:  Magnesium  Inderal  Elavil  Sumatriptan     Report:    Interventional Pain Procedures: (Previous injections)  Occipital nerve blocks 2014, good relief     Past Medical History:   Diagnosis Date    Abnormal Pap smear 1993    cryo for dysplasia    Anxiety     Depression     History of psychiatric care     prozac, lexapro, ativan    Mental disorder     Depression    Neuromuscular disorder     Psychiatric problem     Therapy      Past Surgical History:   Procedure Laterality Date    AUGMENTATION OF BREAST      saline, 2010    Breast implants 1998 1998    BREAST SURGERY  1999, 2010    implants    COSMETIC SURGERY  1999, 2010    breast  implants    Cryo       dental implant      DILATION AND CURETTAGE OF UTERUS      DILATION AND CURETTAGE OF UTERUS      ENDOMETRIAL ABLATION      Hysteroscopy with Novasure endometrial ablation    EYE SURGERY      lasik    HYSTERECTOMY      ROBOT-ASSISTED LAPAROSCOPIC HYSTERECTOMY N/A 2020    Procedure: ROBOTIC HYSTERECTOMY;  Surgeon: Juliane Mcdonnell MD;  Location: [facility];  Service: OB/GYN;  Laterality: N/A;    ROBOT-ASSISTED SURGICAL REMOVAL OF FALLOPIAN TUBE USING DA NAV XI Bilateral 2020    Procedure: XI ROBOTIC SALPINGECTOMY;  Surgeon: Juliane Mcdonnell MD;  Location: [facility];  Service: OB/GYN;  Laterality: Bilateral;     Social History     Socioeconomic History    Marital status:    Occupational History     Employer: [employer]   Tobacco Use    Smoking status: Former     Packs/day: 0.00     Years: 3.00     Pack years: 0.00     Types: Cigarettes     Quit date: 10/15/1999     Years since quittin.0    Smokeless tobacco: Never   Substance and Sexual Activity    Alcohol use: Yes     Alcohol/week: 4.0 standard drinks     Types: 2 Glasses of wine, 2 Standard drinks or equivalent per week     Comment: social    Drug use: No    Sexual activity: Yes     Partners: Male     Birth control/protection: Partner-Vasectomy, Other-see comments     Comment: ablation   Social History Narrative    - 14 years    From [email or web address]    Works for a law firm- - associates degree    3 children     close with mom     Family History   Problem Relation Age of Onset    Diabetes Father     Stroke Father     Kidney disease Father     Diabetes Mother     Hypertension Mother     Coronary artery disease Mother     Depression Mother     Seizures Mother     Arthritis Mother     Heart disease Mother     Breast cancer Maternal Aunt     Cancer Maternal Aunt     Miscarriages / Stillbirths Maternal Aunt     Breast cancer Maternal Aunt     Mental illness Paternal Grandmother     Mental illness Maternal Grandfather      Breast cancer Paternal Aunt     Ovarian cancer Other         maternal great aunt    COPD Maternal Aunt     Heart disease Brother     Colon cancer Neg Hx     Suicide Neg Hx        Review of patient's allergies indicates:   Allergen Reactions    Wool Hives    Effexor [venlafaxine] Palpitations       Current Outpatient Medications   Medication Sig    amitriptyline (ELAVIL) 50 MG tablet Take 1 tablet (50 mg total) by mouth nightly as needed for Insomnia.    EScitalopram oxalate (LEXAPRO) 10 MG tablet Take 1 tablet (10 mg total) by mouth once daily.    finasteride (PROSCAR) 5 mg tablet TK 1 T PO QD    minoxidiL (LONITEN) 10 MG Tab Take 2.5 mg by mouth once daily. Half pill daily    propranoloL (INDERAL) 40 MG tablet TAKE 1 TABLET ONE TIME DAILY AT 6AM    spironolactone (ALDACTONE) 50 MG tablet TK 1 T PO QD FOR 2 WEEKS THEN 2 TS QD FOR 2 WEEKS THEN 3 TS QD THEREAFTER AS TOLERATED    calcium-vitamin D3 (OS-MANJULA 500 + D3) 500 mg-5 mcg (200 unit) per tablet Take 1 tablet by mouth 2 (two) times daily with meals.    LORazepam (ATIVAN) 0.5 MG tablet lorazepam 0.5 mg tablet    melatonin 10 mg Cap Take by mouth.    metroNIDAZOLE (FLAGYL) 500 MG tablet Take 1 tablet (500 mg total) by mouth 2 (two) times daily.    pantoprazole (PROTONIX) 40 MG tablet TAKE 1 TABLET ONE TIME DAILY    zinc gluconate 50 mg tablet Take 15 mg by mouth once daily.     No current facility-administered medications for this visit.       ROS:  GENERAL: No fever. No chills. No fatigue. Denies weight loss. Denies weight gain.  HEENT: +Headaches. Denies vision change. Denies eye pain. Denies double vision. Denies ear pain.   CV: Denies chest pain.   PULM: Denies of shortness of breath.  GI: Denies constipation. No diarrhea. No abdominal pain. Denies nausea. Denies vomiting. No blood in stool.  HEME: Denies bleeding problems.  : Denies urgency. No painful urination. No blood in urine.  MS: Denies joint stiffness. Denies joint swelling.  Denies back  "pain.  SKIN: Denies rash.   NEURO: Denies seizures. No weakness.  PSYCH:  Denies difficulty sleeping. No anxiety. Denies depression. No suicidal thoughts.       VITALS:   Vitals:    11/02/22 0939   BP: 102/72   Pulse: 68   Resp: 19   Temp: 97.5 °F (36.4 °C)   TempSrc: Oral   Weight: 67 kg (147 lb 11.3 oz)   Height: 5' 8.4" (1.737 m)   PainSc:   6   PainLoc: Head     PHYSICAL EXAMINATION:  Voice normal.  Normal affect without evidence of distress.      ASSESSMENT: 47 y.o. year old female with pain, consistent with migraine headaches.    DISCUSSION: Ms. Velasco has a history of migraine headaches. She previously saw Neuro and was diagnosed with mixed tension/migtaine headaches and has been treated with Elavil QHS and Imitrex for rescue. She presented with daily migraines. We were able to reduce the frequency with a preventative agent but she continues having 4 migraines/week and has failed 3 preventative agents.     PLAN:  1) Increased to Propanolol 40mg BID for migraine headaches  2) Scheduled for next available Botox for migraine headaches  3) Can consider repeat Occipital nerve block vs C3,4, TON MBB, but pain is not focally located to this area at this time  4) RTC for botox then 1 month clinic follow-up      Doni Becker  11/02/2022  "

## 2022-12-08 ENCOUNTER — PATIENT MESSAGE (OUTPATIENT)
Dept: PAIN MEDICINE | Facility: CLINIC | Age: 47
End: 2022-12-08
Payer: COMMERCIAL

## 2022-12-09 ENCOUNTER — OFFICE VISIT (OUTPATIENT)
Dept: PAIN MEDICINE | Facility: CLINIC | Age: 47
End: 2022-12-09
Payer: COMMERCIAL

## 2022-12-09 VITALS
BODY MASS INDEX: 22.66 KG/M2 | DIASTOLIC BLOOD PRESSURE: 62 MMHG | HEART RATE: 59 BPM | WEIGHT: 149.5 LBS | SYSTOLIC BLOOD PRESSURE: 97 MMHG | HEIGHT: 68 IN

## 2022-12-09 DIAGNOSIS — G43.709 CHRONIC MIGRAINE WITHOUT AURA WITHOUT STATUS MIGRAINOSUS, NOT INTRACTABLE: Primary | ICD-10-CM

## 2022-12-09 PROCEDURE — 3008F BODY MASS INDEX DOCD: CPT | Mod: CPTII,S$GLB,, | Performed by: ANESTHESIOLOGY

## 2022-12-09 PROCEDURE — 99499 NO LOS: ICD-10-PCS | Mod: S$GLB,,, | Performed by: ANESTHESIOLOGY

## 2022-12-09 PROCEDURE — 3074F SYST BP LT 130 MM HG: CPT | Mod: CPTII,S$GLB,, | Performed by: ANESTHESIOLOGY

## 2022-12-09 PROCEDURE — 3074F PR MOST RECENT SYSTOLIC BLOOD PRESSURE < 130 MM HG: ICD-10-PCS | Mod: CPTII,S$GLB,, | Performed by: ANESTHESIOLOGY

## 2022-12-09 PROCEDURE — 3078F DIAST BP <80 MM HG: CPT | Mod: CPTII,S$GLB,, | Performed by: ANESTHESIOLOGY

## 2022-12-09 PROCEDURE — 99999 PR PBB SHADOW E&M-EST. PATIENT-LVL III: CPT | Mod: PBBFAC,,, | Performed by: ANESTHESIOLOGY

## 2022-12-09 PROCEDURE — 99499 UNLISTED E&M SERVICE: CPT | Mod: S$GLB,,, | Performed by: ANESTHESIOLOGY

## 2022-12-09 PROCEDURE — 3008F PR BODY MASS INDEX (BMI) DOCUMENTED: ICD-10-PCS | Mod: CPTII,S$GLB,, | Performed by: ANESTHESIOLOGY

## 2022-12-09 PROCEDURE — 99999 PR PBB SHADOW E&M-EST. PATIENT-LVL III: ICD-10-PCS | Mod: PBBFAC,,, | Performed by: ANESTHESIOLOGY

## 2022-12-09 PROCEDURE — 3044F PR MOST RECENT HEMOGLOBIN A1C LEVEL <7.0%: ICD-10-PCS | Mod: CPTII,S$GLB,, | Performed by: ANESTHESIOLOGY

## 2022-12-09 PROCEDURE — 1159F PR MEDICATION LIST DOCUMENTED IN MEDICAL RECORD: ICD-10-PCS | Mod: CPTII,S$GLB,, | Performed by: ANESTHESIOLOGY

## 2022-12-09 PROCEDURE — 3078F PR MOST RECENT DIASTOLIC BLOOD PRESSURE < 80 MM HG: ICD-10-PCS | Mod: CPTII,S$GLB,, | Performed by: ANESTHESIOLOGY

## 2022-12-09 PROCEDURE — 1159F MED LIST DOCD IN RCRD: CPT | Mod: CPTII,S$GLB,, | Performed by: ANESTHESIOLOGY

## 2022-12-09 PROCEDURE — 3044F HG A1C LEVEL LT 7.0%: CPT | Mod: CPTII,S$GLB,, | Performed by: ANESTHESIOLOGY

## 2022-12-09 NOTE — PROGRESS NOTES
Patient Name: Lynne Velasco  MRN: [id number]    INFORMED CONSENT: The procedure, risks, benefits and options were discussed with patient. There are no contraindications to the procedure. The patient expressed understanding and agreed to proceed. The personnel performing the procedure was discussed. I verify that I personally obtained Lynne's consent prior to the start of the procedure and the signed consent can be found on the patient's chart.    Procedure Date: 12/09/2022    Anesthesia: Topical    Sedation: None    Pre-operative diagnosis: Chronic Migraine Headaches  Post-operative diagnosis: Same       Procedure: Chemical neurolysis   Risks and benefits were explained including bleeding, infection, worsening of pain, damage to the areas being injected, weakness of muscles, loss of muscle control, dysphagia if injecting the head or neck, facial droop if injecting the facial area, painful injection, allergic or other reaction to the medications being injected, and the failure of the procedure to help the problem. A signed consent was obtained.     The patient was placed in a comfortable area and the sites to be treated were identified. A time out was performed. The area to be treated was prepped three times with alcohol and the alcohol allowed to dry. The muscles were flexed and marked. A 30G 1/2 inch needle was used to inject the medication into the muscles described below.     Injection sites: 31   muscle bilaterally (a total of 10 units divided into 2 sites)  Procerus muscle (a total of 5 units at 1 site)  Frontalis muscle bilaterally (a total of 20 units divided into 4 sites)  Temporalis muscle bilaterally (a total of 40 units divided into 8 sites)   Occipitalis muscle bilaterally (a total of 40 units divided into 8 sites)  Cervical paraspinal muscles (a total of 20 units divided into 4 sites)   Trapezius muscle bilaterally (a total of 30 units divided into 6 sites)     Total Botox used:  155 Units   Botox wastage: 45 Units     Lot: [id number], [id number]  Exp:  2025/03, 2025/03    Complications: None  Disposition: Pt tolerated the procedure well and was monitored for 30 minutes afterwards prior to discharge.   RTC for the next Botox injection: 3 months     Sully Palafox MD  12/09/2022

## 2022-12-28 DIAGNOSIS — G47.00 INSOMNIA, UNSPECIFIED TYPE: ICD-10-CM

## 2022-12-28 DIAGNOSIS — M62.89 PELVIC FLOOR TENSION: ICD-10-CM

## 2022-12-28 RX ORDER — AMITRIPTYLINE HYDROCHLORIDE 50 MG/1
TABLET, FILM COATED ORAL
Qty: 90 TABLET | Refills: 0 | Status: SHIPPED | OUTPATIENT
Start: 2022-12-28 | End: 2023-05-25

## 2022-12-28 NOTE — TELEPHONE ENCOUNTER
Care Due:                  Date            Visit Type   Department     Provider  --------------------------------------------------------------------------------                                MYCHART                              ANNUAL                              CHECKUP/PHY  [facility]  Jf Lowry  Last Visit: 03-      [facility]  Next Visit: None Scheduled  None         None Found                                                            Last  Test          Frequency    Reason                     Performed    Due Date  --------------------------------------------------------------------------------    Office Visit  12 months..  EScitalopram,              03-   03-                             amitriptyline............    [employer] Embedded Care Gaps. Reference number: [id number]. 12/28/2022   3:12:37 PM CST

## 2022-12-29 NOTE — TELEPHONE ENCOUNTER
Refill Decision Note   Lynne Velasco  is requesting a refill authorization.  Brief Assessment and Rationale for Refill:  Approve    -Medication-Related Problems Identified: Requires appointment  Medication Therapy Plan:       Medication Reconciliation Completed: No   Comments:     Provider Staff:     Action is required for this patient.   Please see care gap opportunities below in Care Due Message.     Thanks!  [facility]     Appointments      Date Provider   Last Visit   3/10/2022 Jf Kuo MD   Next Visit   Visit date not found Jf Kuo MD     Note composed:8:14 PM 12/28/2022           Note composed:8:14 PM 12/28/2022

## 2023-02-09 ENCOUNTER — PATIENT MESSAGE (OUTPATIENT)
Dept: PAIN MEDICINE | Facility: CLINIC | Age: 48
End: 2023-02-09
Payer: COMMERCIAL

## 2023-02-10 ENCOUNTER — OFFICE VISIT (OUTPATIENT)
Dept: PAIN MEDICINE | Facility: CLINIC | Age: 48
End: 2023-02-10
Payer: COMMERCIAL

## 2023-02-10 VITALS
BODY MASS INDEX: 22.38 KG/M2 | SYSTOLIC BLOOD PRESSURE: 104 MMHG | DIASTOLIC BLOOD PRESSURE: 65 MMHG | HEIGHT: 68 IN | RESPIRATION RATE: 18 BRPM | WEIGHT: 147.69 LBS | HEART RATE: 69 BPM

## 2023-02-10 DIAGNOSIS — G43.709 CHRONIC MIGRAINE WITHOUT AURA WITHOUT STATUS MIGRAINOSUS, NOT INTRACTABLE: Primary | ICD-10-CM

## 2023-02-10 PROCEDURE — 3078F DIAST BP <80 MM HG: CPT | Mod: CPTII,S$GLB,, | Performed by: ANESTHESIOLOGY

## 2023-02-10 PROCEDURE — 3078F PR MOST RECENT DIASTOLIC BLOOD PRESSURE < 80 MM HG: ICD-10-PCS | Mod: CPTII,S$GLB,, | Performed by: ANESTHESIOLOGY

## 2023-02-10 PROCEDURE — 99999 PR PBB SHADOW E&M-EST. PATIENT-LVL III: ICD-10-PCS | Mod: PBBFAC,,, | Performed by: ANESTHESIOLOGY

## 2023-02-10 PROCEDURE — 3008F BODY MASS INDEX DOCD: CPT | Mod: CPTII,S$GLB,, | Performed by: ANESTHESIOLOGY

## 2023-02-10 PROCEDURE — 3074F SYST BP LT 130 MM HG: CPT | Mod: CPTII,S$GLB,, | Performed by: ANESTHESIOLOGY

## 2023-02-10 PROCEDURE — 99213 OFFICE O/P EST LOW 20 MIN: CPT | Mod: S$GLB,,, | Performed by: ANESTHESIOLOGY

## 2023-02-10 PROCEDURE — 3008F PR BODY MASS INDEX (BMI) DOCUMENTED: ICD-10-PCS | Mod: CPTII,S$GLB,, | Performed by: ANESTHESIOLOGY

## 2023-02-10 PROCEDURE — 3074F PR MOST RECENT SYSTOLIC BLOOD PRESSURE < 130 MM HG: ICD-10-PCS | Mod: CPTII,S$GLB,, | Performed by: ANESTHESIOLOGY

## 2023-02-10 PROCEDURE — 1159F PR MEDICATION LIST DOCUMENTED IN MEDICAL RECORD: ICD-10-PCS | Mod: CPTII,S$GLB,, | Performed by: ANESTHESIOLOGY

## 2023-02-10 PROCEDURE — 1159F MED LIST DOCD IN RCRD: CPT | Mod: CPTII,S$GLB,, | Performed by: ANESTHESIOLOGY

## 2023-02-10 PROCEDURE — 99999 PR PBB SHADOW E&M-EST. PATIENT-LVL III: CPT | Mod: PBBFAC,,, | Performed by: ANESTHESIOLOGY

## 2023-02-10 PROCEDURE — 99213 PR OFFICE/OUTPT VISIT, EST, LEVL III, 20-29 MIN: ICD-10-PCS | Mod: S$GLB,,, | Performed by: ANESTHESIOLOGY

## 2023-02-10 NOTE — PROGRESS NOTES
PCP: Jf Kuo MD    REFERRING PHYSICIAN: No ref. provider found    CHIEF COMPLAINT: migraines    Original HISTORY OF PRESENT ILLNESS: Lynne Velasco presents to the clinic for the evaluation of the above pain. The pain started many years ago.    Original Pain Description:  Headaches are associated with nausea, vomitting, photophobia, phonophobia. The pain is described as aching and throbbing. Radiates behind the eye. Occasionally starts unilaterally but typically evolves to involve whole head throbbing. Exacerbating factors: afternoon. Does have headaches on weekend afternoons but not as severe as weekday headaches. Mitigating factors medications and rest. Symptoms interfere with daily activity and work. The patient feels like symptoms have been worsening. Increasing in frequency and severity. Now occur almost daily around 2pm. Patient denies urinary incontinence, bowel incontinence, significant weight loss, significant motor weakness and ataxia..    Saw [facility] on Zac Gant MD 11/2/2020. Diagnosed with mixed migraine and tension headache with exacerbation by sleep disturbance. Encouraged to follow up with PT to discuss dry needling for migraines. Started on Elavil prn, Mg supplement, imitrex prn, and voltaren prn.  Patient did try to schedule follow up with neurology but was told Dr. Almanza no longer practices here.  Patient takes Elavil nightly, Mg supplement, sumatriptan for abortive treatment. Cannot use sumatriptan at work due to drowsiness.     Original PAIN SCORES:  Best: Pain is 0  Worst: Pain is 10  Usually: Pain is 5  Current: Pain is 0    INTERVAL HISTORY:   Interval History 7/1/2022: Mrs Velasco presents for follow up. She was started on Propanolol 40mg qd over interval. She has had a decrease in HA to q5 days. When they occur they are just as severe however and appear to be primarily in evening time. She is taking propanolol in a.m.. Location is  several places, at times it is frontal, can be temporal, and worse are when originating at base of skull and radiating around scalp.     Interval History 11/2/2022: Since LCV 7/1/2022, the patient did not increase her inderal and feels the migraines have increased in frequency.  The migraines are slightly worse compared to her previous visit. The migraines are described as a sharp pain located in the parietotemporal area on the right and anterior to the left eye. Light makes the pain worse. She has to lay down for about an 1.5 hours for the HA to improve. She is having migraines at least 4 days per week and 16 days per month. She typically gets the migraines at work. Patient said that she is taking sumatriptan as abortive treatment when severe.  She is sleeping about 4.5 hours nightly and has difficulty falling asleep and staying asleep. She wakes up about 4 times nightly due to thoughts and insomnia. She is no longer taking magnesium.  She is working as a  still and has limitations due to migraine.    Interval History 2/10/23: Patient presents for follow up after Botox injections for migraine headaches.  Patient had Botox on 12/9/22 and she reports 100% relief of her migraine headaches.  She will occasionally have a minor headache, but no migraines since her Botox treatment.  She wishes to schedule her 3 month repeat injections.  Patient denies difficulty swallowing.  Denies difficulty holding head up.    6 weeks of Conservative therapy (PT/Chiro/Home Exercises with Dates)  Physical therapy for migraines. Has tried PT in the past.      Interval History 2/10/23  Mrs. Velasco is here for a follow up regarding her migraines, last visit 12/09/22 she received Botox injection for her Migraines.           Treatments / Medications: (Ice/Heat/NSAIDS/APAP/etc):  Elavil, nightly  Mg supplement  sumatriptan prn  Ibuprofen    Migraine medications tried:  Magnesium  Inderal  Elavil  Sumatriptan      Report:    Interventional Pain Procedures: (Previous injections)  Occipital nerve blocks , good relief     Past Medical History:   Diagnosis Date    Abnormal Pap smear     cryo for dysplasia    Anxiety     Depression     History of psychiatric care     prozac, lexapro, ativan    Mental disorder     Depression    Neuromuscular disorder     Psychiatric problem     Therapy      Past Surgical History:   Procedure Laterality Date    AUGMENTATION OF BREAST      saline,     Breast implants 1998    BREAST SURGERY  ,     implants    COSMETIC SURGERY  2010    breast implants    Cryo       dental implant      DILATION AND CURETTAGE OF UTERUS      DILATION AND CURETTAGE OF UTERUS      ENDOMETRIAL ABLATION      Hysteroscopy with Novasure endometrial ablation    EYE SURGERY      lasik    HYSTERECTOMY      ROBOT-ASSISTED LAPAROSCOPIC HYSTERECTOMY N/A 2020    Procedure: ROBOTIC HYSTERECTOMY;  Surgeon: Juliane Mcdonnell MD;  Location: [facility] OR;  Service: OB/GYN;  Laterality: N/A;    ROBOT-ASSISTED SURGICAL REMOVAL OF FALLOPIAN TUBE USING DA NAV XI Bilateral 2020    Procedure: XI ROBOTIC SALPINGECTOMY;  Surgeon: Juliane Mcdonnell MD;  Location: [facility] OR;  Service: OB/GYN;  Laterality: Bilateral;     Social History     Socioeconomic History    Marital status:    Occupational History     Employer: [employer]   Tobacco Use    Smoking status: Former     Packs/day: 0.00     Years: 3.00     Pack years: 0.00     Types: Cigarettes     Quit date: 10/15/1999     Years since quittin.3    Smokeless tobacco: Never   Substance and Sexual Activity    Alcohol use: Yes     Alcohol/week: 4.0 standard drinks     Types: 2 Glasses of wine, 2 Standard drinks or equivalent per week     Comment: social    Drug use: No    Sexual activity: Yes     Partners: Male     Birth control/protection: Partner-Vasectomy, Other-see comments     Comment: ablation   Social History Narrative    - 14 years     From [place]    Works for a law firm- - associates degree    3 children     close with mom     Family History   Problem Relation Age of Onset    Diabetes Father     Stroke Father     Kidney disease Father     Diabetes Mother     Hypertension Mother     Coronary artery disease Mother     Depression Mother     Seizures Mother     Arthritis Mother     Heart disease Mother     Breast cancer Maternal Aunt     Cancer Maternal Aunt     Miscarriages / Stillbirths Maternal Aunt     Breast cancer Maternal Aunt     Mental illness Paternal Grandmother     Mental illness Maternal Grandfather     Breast cancer Paternal Aunt     Ovarian cancer Other         maternal great aunt    COPD Maternal Aunt     Heart disease Brother     Colon cancer Neg Hx     Suicide Neg Hx        Review of patient's allergies indicates:   Allergen Reactions    Wool Hives    Effexor [venlafaxine] Palpitations       Current Outpatient Medications   Medication Sig    amitriptyline (ELAVIL) 50 MG tablet TAKE 1 TABLET EVERY NIGHT AS NEEDED FOR INSOMNIA    EScitalopram oxalate (LEXAPRO) 10 MG tablet Take 1 tablet (10 mg total) by mouth once daily.    finasteride (PROSCAR) 5 mg tablet TK 1 T PO QD    LORazepam (ATIVAN) 0.5 MG tablet lorazepam 0.5 mg tablet    minoxidiL (LONITEN) 10 MG Tab Take 2.5 mg by mouth once daily. Half pill daily    pantoprazole (PROTONIX) 40 MG tablet TAKE 1 TABLET ONE TIME DAILY    propranoloL (INDERAL) 40 MG tablet Take 1 tablet (40 mg total) by mouth 2 (two) times daily.    spironolactone (ALDACTONE) 50 MG tablet TK 1 T PO QD FOR 2 WEEKS THEN 2 TS QD FOR 2 WEEKS THEN 3 TS QD THEREAFTER AS TOLERATED    calcium-vitamin D3 (OS-MANJULA 500 + D3) 500 mg-5 mcg (200 unit) per tablet Take 1 tablet by mouth 2 (two) times daily with meals.    melatonin 10 mg Cap Take by mouth.    metroNIDAZOLE (FLAGYL) 500 MG tablet Take 1 tablet (500 mg total) by mouth 2 (two) times daily. (Patient not taking: Reported on 2/10/2023)    zinc gluconate 50  "mg tablet Take 15 mg by mouth once daily.     No current facility-administered medications for this visit.       ROS:  GENERAL: No fever. No chills. No fatigue. Denies weight loss. Denies weight gain.  HEENT: +Headaches. Denies vision change. Denies eye pain. Denies double vision. Denies ear pain.   CV: Denies chest pain.   PULM: Denies of shortness of breath.  GI: Denies constipation. No diarrhea. No abdominal pain. Denies nausea. Denies vomiting. No blood in stool.  HEME: Denies bleeding problems.  : Denies urgency. No painful urination. No blood in urine.  MS: Denies joint stiffness. Denies joint swelling.  Denies back pain.  SKIN: Denies rash.   NEURO: Denies seizures. No weakness.  PSYCH:  Denies difficulty sleeping. No anxiety. Denies depression. No suicidal thoughts.       VITALS:   Vitals:    02/10/23 1517   BP: 104/65   Pulse: 69   Resp: 18   Weight: 67 kg (147 lb 11.3 oz)   Height: 5' 8" (1.727 m)   PainSc:   6   PainLoc: Head       PHYSICAL EXAMINATION:  Voice normal.  Normal affect without evidence of distress.      ASSESSMENT: 47 y.o. year old female with pain, consistent with migraine headaches.    DISCUSSION: Ms. Velasco has a history of migraine headaches. She previously saw Neuro and was diagnosed with mixed tension/migtaine headaches and has been treated with Elavil QHS and Imitrex for rescue. She presented with daily migraines. We were able to reduce the frequency with a preventative agent but she continues having 4 migraines/week and has failed 3 preventative agents. She got 100% relief after Botox.     PLAN:  1) Schedule for repeat Botox injections Q3 months  2) Continue preventative agents and Imitrex as needed      Felipe Diggs  02/10/2023    "

## 2023-03-10 ENCOUNTER — OFFICE VISIT (OUTPATIENT)
Dept: PAIN MEDICINE | Facility: CLINIC | Age: 48
End: 2023-03-10
Payer: COMMERCIAL

## 2023-03-10 VITALS
DIASTOLIC BLOOD PRESSURE: 73 MMHG | SYSTOLIC BLOOD PRESSURE: 105 MMHG | BODY MASS INDEX: 22.38 KG/M2 | RESPIRATION RATE: 18 BRPM | HEIGHT: 68 IN | WEIGHT: 147.69 LBS | HEART RATE: 71 BPM

## 2023-03-10 DIAGNOSIS — G43.709 CHRONIC MIGRAINE WITHOUT AURA WITHOUT STATUS MIGRAINOSUS, NOT INTRACTABLE: Primary | ICD-10-CM

## 2023-03-10 PROCEDURE — 3074F PR MOST RECENT SYSTOLIC BLOOD PRESSURE < 130 MM HG: ICD-10-PCS | Mod: CPTII,S$GLB,, | Performed by: ANESTHESIOLOGY

## 2023-03-10 PROCEDURE — 3008F BODY MASS INDEX DOCD: CPT | Mod: CPTII,S$GLB,, | Performed by: ANESTHESIOLOGY

## 2023-03-10 PROCEDURE — 3078F PR MOST RECENT DIASTOLIC BLOOD PRESSURE < 80 MM HG: ICD-10-PCS | Mod: CPTII,S$GLB,, | Performed by: ANESTHESIOLOGY

## 2023-03-10 PROCEDURE — 3078F DIAST BP <80 MM HG: CPT | Mod: CPTII,S$GLB,, | Performed by: ANESTHESIOLOGY

## 2023-03-10 PROCEDURE — 1159F PR MEDICATION LIST DOCUMENTED IN MEDICAL RECORD: ICD-10-PCS | Mod: CPTII,S$GLB,, | Performed by: ANESTHESIOLOGY

## 2023-03-10 PROCEDURE — 3008F PR BODY MASS INDEX (BMI) DOCUMENTED: ICD-10-PCS | Mod: CPTII,S$GLB,, | Performed by: ANESTHESIOLOGY

## 2023-03-10 PROCEDURE — 99999 PR PBB SHADOW E&M-EST. PATIENT-LVL III: ICD-10-PCS | Mod: PBBFAC,,, | Performed by: ANESTHESIOLOGY

## 2023-03-10 PROCEDURE — 1159F MED LIST DOCD IN RCRD: CPT | Mod: CPTII,S$GLB,, | Performed by: ANESTHESIOLOGY

## 2023-03-10 PROCEDURE — 99499 UNLISTED E&M SERVICE: CPT | Mod: S$GLB,,, | Performed by: ANESTHESIOLOGY

## 2023-03-10 PROCEDURE — 99499 NO LOS: ICD-10-PCS | Mod: S$GLB,,, | Performed by: ANESTHESIOLOGY

## 2023-03-10 PROCEDURE — 3074F SYST BP LT 130 MM HG: CPT | Mod: CPTII,S$GLB,, | Performed by: ANESTHESIOLOGY

## 2023-03-10 PROCEDURE — 99999 PR PBB SHADOW E&M-EST. PATIENT-LVL III: CPT | Mod: PBBFAC,,, | Performed by: ANESTHESIOLOGY

## 2023-03-10 NOTE — PROGRESS NOTES
Patient Name: Lynne Velasco  MRN: [id number]    INFORMED CONSENT: The procedure, risks, benefits and options were discussed with patient. There are no contraindications to the procedure. The patient expressed understanding and agreed to proceed. The personnel performing the procedure was discussed. I verify that I personally obtained Lynne's consent prior to the start of the procedure and the signed consent can be found on the patient's chart.    Procedure Date: 03/10/2023    Anesthesia: Topical    Sedation: None    Pre-operative diagnosis: chronic migraines  Post-operative diagnosis: Same       Procedure: Chemical neurolysis   Risks and benefits were explained including bleeding, infection, worsening of pain, damage to the areas being injected, weakness of muscles, loss of muscle control, dysphagia if injecting the head or neck, facial droop if injecting the facial area, painful injection, allergic or other reaction to the medications being injected, and the failure of the procedure to help the problem. A signed consent was obtained.     The patient was placed in a comfortable area and the sites to be treated were identified. A time out was performed. The area to be treated was prepped three times with alcohol and the alcohol allowed to dry. The muscles were flexed and marked. A 27G 1/2 inch needle was used to inject the medication into the muscles described below.     Injection sites: 31   muscle bilaterally (a total of 10 units divided into 2 sites)  Procerus muscle (a total of 5 units at 1 site)  Frontalis muscle bilaterally (a total of 20 units divided into 4 sites)  Temporalis muscle bilaterally (a total of 40 units divided into 8 sites)   Occipitalis muscle bilaterally (a total of 40 units divided into 8 sites)  Cervical paraspinal muscles (a total of 20 units divided into 4 sites)   Trapezius muscle bilaterally (a total of 30 units divided into 6 sites)     Total Botox used: 155  Units   Botox wastage: 45 Units     Lot: [id number], [id number]  Exp:  2025/03    Complications: None  Disposition: Pt tolerated the procedure well and was monitored for 30 minutes afterwards prior to discharge.   RTC for the next Botox injection: 3 months     uSlly Palafox MD  03/10/2023  Chr

## 2023-05-25 DIAGNOSIS — G47.00 INSOMNIA, UNSPECIFIED TYPE: ICD-10-CM

## 2023-05-25 DIAGNOSIS — M62.89 PELVIC FLOOR TENSION: ICD-10-CM

## 2023-05-25 RX ORDER — AMITRIPTYLINE HYDROCHLORIDE 50 MG/1
TABLET, FILM COATED ORAL
Qty: 90 TABLET | Refills: 0 | Status: SHIPPED | OUTPATIENT
Start: 2023-05-25 | End: 2023-10-12 | Stop reason: SDUPTHER

## 2023-05-25 NOTE — TELEPHONE ENCOUNTER
Care Due:                  Date            Visit Type   Department     Provider  --------------------------------------------------------------------------------                                MYCHART                              ANNUAL                              CHECKUP/PHY  [facility] [place] [facility]  Jf Lowry  Last Visit: 03-      [facility]  Next Visit: None Scheduled  None         None Found                                                            Last  Test          Frequency    Reason                     Performed    Due Date  --------------------------------------------------------------------------------    Office Visit  12 months..  EScitalopram,              03-   03-                             amitriptyline............    [employer] Embedded Care Due Messages. Reference number: [id number].   5/25/2023 2:28:11 AM CDT

## 2023-05-25 NOTE — TELEPHONE ENCOUNTER
Provider Staff:  Action required for this patient     Please see care gap opportunities below in Care Due Message.    Thanks!  [facility]     Appointments      Date Provider   Last Visit   3/10/2022 Jf Kuo MD   Next Visit   Visit date not found Jf Kuo MD     Refill Decision Note   Lynne Velasco  is requesting a refill authorization.  Brief Assessment and Rationale for Refill:  Approve     Medication Therapy Plan:         Comments:     Note composed:7:13 AM 05/25/2023             Appointments     Last Visit   3/10/2022 Jf Kuo MD   Next Visit   Visit date not found Jf Kuo MD

## 2023-06-09 ENCOUNTER — OFFICE VISIT (OUTPATIENT)
Dept: PAIN MEDICINE | Facility: CLINIC | Age: 48
End: 2023-06-09
Payer: COMMERCIAL

## 2023-06-09 ENCOUNTER — TELEPHONE (OUTPATIENT)
Dept: PAIN MEDICINE | Facility: CLINIC | Age: 48
End: 2023-06-09

## 2023-06-09 VITALS
BODY MASS INDEX: 21.15 KG/M2 | OXYGEN SATURATION: 100 % | HEART RATE: 85 BPM | SYSTOLIC BLOOD PRESSURE: 101 MMHG | DIASTOLIC BLOOD PRESSURE: 72 MMHG | RESPIRATION RATE: 18 BRPM | WEIGHT: 139.13 LBS

## 2023-06-09 DIAGNOSIS — G43.709 CHRONIC MIGRAINE WITHOUT AURA WITHOUT STATUS MIGRAINOSUS, NOT INTRACTABLE: Primary | ICD-10-CM

## 2023-06-09 PROCEDURE — 99999 PR PBB SHADOW E&M-EST. PATIENT-LVL IV: ICD-10-PCS | Mod: PBBFAC,,, | Performed by: ANESTHESIOLOGY

## 2023-06-09 PROCEDURE — 99499 UNLISTED E&M SERVICE: CPT | Mod: S$GLB,,, | Performed by: ANESTHESIOLOGY

## 2023-06-09 PROCEDURE — 99499 NO LOS: ICD-10-PCS | Mod: S$GLB,,, | Performed by: ANESTHESIOLOGY

## 2023-06-09 PROCEDURE — 99999 PR PBB SHADOW E&M-EST. PATIENT-LVL IV: CPT | Mod: PBBFAC,,, | Performed by: ANESTHESIOLOGY

## 2023-06-09 NOTE — PROGRESS NOTES
Patient Name: Lynne Velasco  MRN: [id number]    INFORMED CONSENT: The procedure, risks, benefits and options were discussed with patient. There are no contraindications to the procedure. The patient expressed understanding and agreed to proceed. The personnel performing the procedure was discussed. I verify that I personally obtained Lynne's consent prior to the start of the procedure and the signed consent can be found on the patient's chart.    Procedure Date: 06/09/2023    Anesthesia: Topical    Sedation: None    Pre-operative diagnosis: chronic migraines  Post-operative diagnosis: Same       Procedure: Chemical neurolysis   Risks and benefits were explained including bleeding, infection, worsening of pain, damage to the areas being injected, weakness of muscles, loss of muscle control, dysphagia if injecting the head or neck, facial droop if injecting the facial area, painful injection, allergic or other reaction to the medications being injected, and the failure of the procedure to help the problem. A signed consent was obtained.     The patient was placed in a comfortable area and the sites to be treated were identified. A time out was performed. The area to be treated was prepped three times with alcohol and the alcohol allowed to dry. The muscles were flexed and marked. A 27G 1/2 inch needle was used to inject the medication into the muscles described below.     Injection sites: 31   muscle bilaterally (a total of 10 units divided into 2 sites)  Procerus muscle (a total of 5 units at 1 site)  Frontalis muscle bilaterally (a total of 20 units divided into 4 sites)  Temporalis muscle bilaterally (a total of 40 units divided into 8 sites)   Occipitalis muscle bilaterally (a total of 40 units divided into 8 sites)  Cervical paraspinal muscles (a total of 20 units divided into 4 sites)   Trapezius muscle bilaterally (a total of 30 units divided into 6 sites)     Total Botox used: 155  Units   Botox wastage: 45 Units     Lot: [id number] / [id number]  Exp:  2025/10    Complications: None  Disposition: Pt tolerated the procedure well and was monitored for 30 minutes afterwards prior to discharge.   RTC for the next Botox injection: 3 months     Sully Palafox MD  06/09/2023

## 2023-08-29 RX ORDER — PANTOPRAZOLE SODIUM 40 MG/1
40 TABLET, DELAYED RELEASE ORAL
Qty: 90 TABLET | Refills: 3 | Status: SHIPPED | OUTPATIENT
Start: 2023-08-29 | End: 2024-03-05 | Stop reason: SDUPTHER

## 2023-08-29 NOTE — TELEPHONE ENCOUNTER
Refills requested on protonix 40 mg QD; last refills given on 08/22/22 90 tablets with 3 refills; last office visit on 06/16/2022

## 2023-09-13 ENCOUNTER — TELEPHONE (OUTPATIENT)
Dept: PAIN MEDICINE | Facility: CLINIC | Age: 48
End: 2023-09-13

## 2023-09-13 ENCOUNTER — OFFICE VISIT (OUTPATIENT)
Dept: PAIN MEDICINE | Facility: CLINIC | Age: 48
End: 2023-09-13
Payer: COMMERCIAL

## 2023-09-13 VITALS
HEIGHT: 68 IN | TEMPERATURE: 99 F | SYSTOLIC BLOOD PRESSURE: 97 MMHG | HEART RATE: 83 BPM | BODY MASS INDEX: 19.51 KG/M2 | WEIGHT: 128.75 LBS | DIASTOLIC BLOOD PRESSURE: 67 MMHG | RESPIRATION RATE: 19 BRPM

## 2023-09-13 DIAGNOSIS — G43.709 CHRONIC MIGRAINE WITHOUT AURA WITHOUT STATUS MIGRAINOSUS, NOT INTRACTABLE: Primary | ICD-10-CM

## 2023-09-13 PROCEDURE — 99499 NO LOS: ICD-10-PCS | Mod: S$GLB,,, | Performed by: ANESTHESIOLOGY

## 2023-09-13 PROCEDURE — 99499 UNLISTED E&M SERVICE: CPT | Mod: S$GLB,,, | Performed by: ANESTHESIOLOGY

## 2023-09-13 PROCEDURE — 99999 PR PBB SHADOW E&M-EST. PATIENT-LVL IV: ICD-10-PCS | Mod: PBBFAC,,, | Performed by: ANESTHESIOLOGY

## 2023-09-13 PROCEDURE — 99999 PR PBB SHADOW E&M-EST. PATIENT-LVL IV: CPT | Mod: PBBFAC,,, | Performed by: ANESTHESIOLOGY

## 2023-09-13 NOTE — PROGRESS NOTES
Patient Name: Lynne Velasco  MRN: [id number]    INFORMED CONSENT: The procedure, risks, benefits and options were discussed with patient. There are no contraindications to the procedure. The patient expressed understanding and agreed to proceed. The personnel performing the procedure was discussed. I verify that I personally obtained Lynne's consent prior to the start of the procedure and the signed consent can be found on the patient's chart.    Procedure Date: 09/13/2023    Anesthesia: Topical    Sedation: None    Pre-operative diagnosis: chronic migraines  Post-operative diagnosis: Same       Procedure: Chemical neurolysis   Risks and benefits were explained including bleeding, infection, worsening of pain, damage to the areas being injected, weakness of muscles, loss of muscle control, dysphagia if injecting the head or neck, facial droop if injecting the facial area, painful injection, allergic or other reaction to the medications being injected, and the failure of the procedure to help the problem. A signed consent was obtained.     The patient was placed in a comfortable area and the sites to be treated were identified. A time out was performed. The area to be treated was prepped three times with alcohol and the alcohol allowed to dry. The muscles were flexed and marked. A 27G 1/2 inch needle was used to inject the medication into the muscles described below.     Injection sites: 31   muscle bilaterally (a total of 10 units divided into 2 sites)  Procerus muscle (a total of 5 units at 1 site)  Frontalis muscle bilaterally (a total of 20 units divided into 4 sites)  Temporalis muscle bilaterally (a total of 40 units divided into 8 sites)   Occipitalis muscle bilaterally (a total of 40 units divided into 8 sites)  Cervical paraspinal muscles (a total of 20 units divided into 4 sites)   Trapezius muscle bilaterally (a total of 30 units divided into 6 sites)     Total Botox used: 155  Units   Botox wastage: 45 Units     Lot: [id number] / [id number]  Exp:  2025/12    Complications: None  Disposition: Pt tolerated the procedure well and was monitored for 15 minutes afterwards prior to discharge.   RTC for the next Botox injection: 3 months     Sully Palafox MD  09/13/2023

## 2023-09-21 ENCOUNTER — PATIENT MESSAGE (OUTPATIENT)
Dept: INTERNAL MEDICINE | Facility: CLINIC | Age: 48
End: 2023-09-21
Payer: COMMERCIAL

## 2023-09-21 DIAGNOSIS — Z12.31 BREAST CANCER SCREENING BY MAMMOGRAM: Primary | ICD-10-CM

## 2023-09-25 ENCOUNTER — PATIENT MESSAGE (OUTPATIENT)
Dept: UROGYNECOLOGY | Facility: CLINIC | Age: 48
End: 2023-09-25
Payer: COMMERCIAL

## 2023-09-25 DIAGNOSIS — R35.0 URINARY FREQUENCY: Primary | ICD-10-CM

## 2023-09-26 ENCOUNTER — HOSPITAL ENCOUNTER (OUTPATIENT)
Dept: RADIOLOGY | Facility: OTHER | Age: 48
Discharge: HOME | End: 2023-09-26
Attending: FAMILY MEDICINE
Payer: COMMERCIAL

## 2023-09-26 DIAGNOSIS — Z12.31 BREAST CANCER SCREENING BY MAMMOGRAM: ICD-10-CM

## 2023-09-26 PROCEDURE — 77063 MAMMO DIGITAL SCREENING BILAT WITH TOMO: ICD-10-PCS | Mod: 26,,, | Performed by: RADIOLOGY

## 2023-09-26 PROCEDURE — 77067 SCR MAMMO BI INCL CAD: CPT | Mod: TC

## 2023-09-26 PROCEDURE — 77067 MAMMO DIGITAL SCREENING BILAT WITH TOMO: ICD-10-PCS | Mod: 26,,, | Performed by: RADIOLOGY

## 2023-09-26 PROCEDURE — 77067 SCR MAMMO BI INCL CAD: CPT | Mod: 26,,, | Performed by: RADIOLOGY

## 2023-09-26 PROCEDURE — 77063 BREAST TOMOSYNTHESIS BI: CPT | Mod: 26,,, | Performed by: RADIOLOGY

## 2023-09-28 ENCOUNTER — LAB VISIT (OUTPATIENT)
Dept: LAB | Facility: OTHER | Age: 48
End: 2023-09-28
Attending: NURSE PRACTITIONER
Payer: COMMERCIAL

## 2023-09-28 DIAGNOSIS — R35.0 URINARY FREQUENCY: ICD-10-CM

## 2023-09-28 LAB
BACTERIA #/AREA URNS HPF: ABNORMAL /HPF
BILIRUB UR QL STRIP: NEGATIVE
CLARITY UR: CLEAR
COLOR UR: YELLOW
GLUCOSE UR QL STRIP: NEGATIVE
HGB UR QL STRIP: NEGATIVE
KETONES UR QL STRIP: NEGATIVE
LEUKOCYTE ESTERASE UR QL STRIP: NEGATIVE
MICROSCOPIC COMMENT: ABNORMAL
NITRITE UR QL STRIP: POSITIVE
PH UR STRIP: 6 [PH] (ref 5–8)
PROT UR QL STRIP: NEGATIVE
RBC #/AREA URNS HPF: 0 /HPF (ref 0–4)
SP GR UR STRIP: 1.01 (ref 1–1.03)
SQUAMOUS #/AREA URNS HPF: 3 /HPF
URN SPEC COLLECT METH UR: ABNORMAL
UROBILINOGEN UR STRIP-ACNC: NEGATIVE EU/DL
WBC #/AREA URNS HPF: 9 /HPF (ref 0–5)

## 2023-09-28 PROCEDURE — 87086 URINE CULTURE/COLONY COUNT: CPT | Performed by: NURSE PRACTITIONER

## 2023-09-28 PROCEDURE — 87077 CULTURE AEROBIC IDENTIFY: CPT | Performed by: NURSE PRACTITIONER

## 2023-09-28 PROCEDURE — 87088 URINE BACTERIA CULTURE: CPT | Performed by: NURSE PRACTITIONER

## 2023-09-28 PROCEDURE — 87186 SC STD MICRODIL/AGAR DIL: CPT | Performed by: NURSE PRACTITIONER

## 2023-09-28 PROCEDURE — 81000 URINALYSIS NONAUTO W/SCOPE: CPT | Performed by: NURSE PRACTITIONER

## 2023-09-29 RX ORDER — CIPROFLOXACIN 250 MG/1
250 TABLET, FILM COATED ORAL 2 TIMES DAILY
Qty: 6 TABLET | Refills: 0 | Status: SHIPPED | OUTPATIENT
Start: 2023-09-29 | End: 2023-10-02

## 2023-09-30 LAB — BACTERIA UR CULT: ABNORMAL

## 2023-10-04 DIAGNOSIS — G43.909 MIGRAINE WITHOUT STATUS MIGRAINOSUS, NOT INTRACTABLE, UNSPECIFIED MIGRAINE TYPE: ICD-10-CM

## 2023-10-04 RX ORDER — PROPRANOLOL HYDROCHLORIDE 40 MG/1
40 TABLET ORAL 2 TIMES DAILY
Qty: 60 TABLET | Refills: 2 | Status: SHIPPED | OUTPATIENT
Start: 2023-10-04 | End: 2024-03-26

## 2023-10-04 NOTE — TELEPHONE ENCOUNTER
Patient was just seen with you on 9/13/23 and she received botox. Didn't see this medication in her plan, but it was prescribed over 11 months ago.

## 2023-10-06 ENCOUNTER — HOSPITAL ENCOUNTER (OUTPATIENT)
Dept: RADIOLOGY | Facility: OTHER | Age: 48
Discharge: HOME | End: 2023-10-06
Attending: FAMILY MEDICINE
Payer: COMMERCIAL

## 2023-10-06 DIAGNOSIS — F32.A DEPRESSION, UNSPECIFIED DEPRESSION TYPE: ICD-10-CM

## 2023-10-06 DIAGNOSIS — R92.8 ABNORMAL MAMMOGRAM: ICD-10-CM

## 2023-10-06 PROCEDURE — 77065 DX MAMMO INCL CAD UNI: CPT | Mod: 26,RT,, | Performed by: RADIOLOGY

## 2023-10-06 PROCEDURE — 77065 MAMMO DIGITAL DIAGNOSTIC RIGHT WITH TOMO: ICD-10-PCS | Mod: 26,RT,, | Performed by: RADIOLOGY

## 2023-10-06 PROCEDURE — 77061 MAMMO DIGITAL DIAGNOSTIC RIGHT WITH TOMO: ICD-10-PCS | Mod: 26,RT,, | Performed by: RADIOLOGY

## 2023-10-06 PROCEDURE — 77061 BREAST TOMOSYNTHESIS UNI: CPT | Mod: TC,RT

## 2023-10-06 PROCEDURE — 77061 BREAST TOMOSYNTHESIS UNI: CPT | Mod: 26,RT,, | Performed by: RADIOLOGY

## 2023-10-06 RX ORDER — ESCITALOPRAM OXALATE 10 MG/1
10 TABLET ORAL DAILY
Qty: 30 TABLET | Refills: 11 | OUTPATIENT
Start: 2023-10-06

## 2023-10-06 RX ORDER — CEPHALEXIN 500 MG/1
500 CAPSULE ORAL 2 TIMES DAILY
Qty: 14 CAPSULE | Refills: 0 | Status: SHIPPED | OUTPATIENT
Start: 2023-10-06 | End: 2023-10-13

## 2023-10-06 NOTE — TELEPHONE ENCOUNTER
Refill Decision Note   Lynne Gonsalezque  is requesting a refill authorization.  Brief Assessment and Rationale for Refill:  Quick Discontinue     Medication Therapy Plan:  Previosuly denied; Patient needs appt      Comments:     Note composed:2:48 PM 10/06/2023

## 2023-10-06 NOTE — TELEPHONE ENCOUNTER
No care due was identified.  [facility] Embedded Care Due Messages. Reference number: [id number].   10/06/2023 1:55:15 PM CDT

## 2023-10-07 ENCOUNTER — PATIENT MESSAGE (OUTPATIENT)
Dept: INTERNAL MEDICINE | Facility: CLINIC | Age: 48
End: 2023-10-07
Payer: COMMERCIAL

## 2023-10-07 DIAGNOSIS — F32.A DEPRESSION, UNSPECIFIED DEPRESSION TYPE: ICD-10-CM

## 2023-10-09 RX ORDER — ESCITALOPRAM OXALATE 10 MG/1
10 TABLET ORAL DAILY
Qty: 90 TABLET | Refills: 0 | Status: SHIPPED | OUTPATIENT
Start: 2023-10-09 | End: 2023-10-10 | Stop reason: SDUPTHER

## 2023-10-09 NOTE — TELEPHONE ENCOUNTER
No care due was identified.  [facility] Embedded Care Due Messages. Reference number: [id number].   10/09/2023 8:59:18 AM CDT

## 2023-10-10 DIAGNOSIS — F32.A DEPRESSION, UNSPECIFIED DEPRESSION TYPE: ICD-10-CM

## 2023-10-10 RX ORDER — ESCITALOPRAM OXALATE 10 MG/1
10 TABLET ORAL DAILY
Qty: 90 TABLET | Refills: 0 | Status: SHIPPED | OUTPATIENT
Start: 2023-10-10 | End: 2023-10-12 | Stop reason: SDUPTHER

## 2023-10-10 NOTE — TELEPHONE ENCOUNTER
Refill Encounter lov-3/22/23 pt requesting [facility] for her Lexapro    PCP Visits: Recent Visits  No visits were found meeting these conditions.  Showing recent visits within past 360 days and meeting all other requirements  Future Appointments  Date Type Provider Dept   10/12/23 Appointment Jf Kuo MD [facility] [place] [facility]   Showing future appointments within next 720 days and meeting all other requirements     Last 3 Blood Pressure:   BP Readings from Last 3 Encounters:   09/13/23 97/67   06/09/23 101/72   03/10/23 105/73     Preferred Pharmacy:       [facility] - [place], [place] - [address]  [address] [address]  Phone: [phone] Fax: [phone]    Requested RX:  Requested Prescriptions      No prescriptions requested or ordered in this encounter      RX Route: Normal

## 2023-10-10 NOTE — TELEPHONE ENCOUNTER
----- Message from Samara Trejo sent at 10/10/2023  1:47 PM CDT -----  Regarding: Medication  Refill  Request                    Reply in [facility]:  NO      Please refill the medication listed below. Please call the patient  [phone] (V)        Medication       EScitalopram oxalate (LEXAPRO) 10 MG tablet    /    90 day       Preferred Pharmacy:  [facility] - [facility] [address]   Phone: [phone]  Fax:  [phone]

## 2023-10-10 NOTE — TELEPHONE ENCOUNTER
No care due was identified.  [facility] Embedded Care Due Messages. Reference number: [id number].   10/10/2023 3:00:07 PM CDT

## 2023-10-12 ENCOUNTER — OFFICE VISIT (OUTPATIENT)
Dept: INTERNAL MEDICINE | Facility: CLINIC | Age: 48
End: 2023-10-12
Attending: FAMILY MEDICINE
Payer: COMMERCIAL

## 2023-10-12 VITALS
BODY MASS INDEX: 19.36 KG/M2 | HEIGHT: 68 IN | HEART RATE: 78 BPM | DIASTOLIC BLOOD PRESSURE: 60 MMHG | WEIGHT: 127.75 LBS | OXYGEN SATURATION: 98 % | SYSTOLIC BLOOD PRESSURE: 89 MMHG

## 2023-10-12 DIAGNOSIS — Z00.00 PREVENTATIVE HEALTH CARE: Primary | ICD-10-CM

## 2023-10-12 DIAGNOSIS — F32.A DEPRESSION, UNSPECIFIED DEPRESSION TYPE: ICD-10-CM

## 2023-10-12 DIAGNOSIS — G47.00 INSOMNIA, UNSPECIFIED TYPE: ICD-10-CM

## 2023-10-12 DIAGNOSIS — M62.89 PELVIC FLOOR TENSION: ICD-10-CM

## 2023-10-12 PROCEDURE — 99999 PR PBB SHADOW E&M-EST. PATIENT-LVL IV: CPT | Mod: PBBFAC,,, | Performed by: FAMILY MEDICINE

## 2023-10-12 PROCEDURE — 1159F MED LIST DOCD IN RCRD: CPT | Mod: CPTII,S$GLB,, | Performed by: FAMILY MEDICINE

## 2023-10-12 PROCEDURE — 1160F RVW MEDS BY RX/DR IN RCRD: CPT | Mod: CPTII,S$GLB,, | Performed by: FAMILY MEDICINE

## 2023-10-12 PROCEDURE — 3078F PR MOST RECENT DIASTOLIC BLOOD PRESSURE < 80 MM HG: ICD-10-PCS | Mod: CPTII,S$GLB,, | Performed by: FAMILY MEDICINE

## 2023-10-12 PROCEDURE — 1160F PR REVIEW ALL MEDS BY PRESCRIBER/CLIN PHARMACIST DOCUMENTED: ICD-10-PCS | Mod: CPTII,S$GLB,, | Performed by: FAMILY MEDICINE

## 2023-10-12 PROCEDURE — 3008F BODY MASS INDEX DOCD: CPT | Mod: CPTII,S$GLB,, | Performed by: FAMILY MEDICINE

## 2023-10-12 PROCEDURE — 3074F PR MOST RECENT SYSTOLIC BLOOD PRESSURE < 130 MM HG: ICD-10-PCS | Mod: CPTII,S$GLB,, | Performed by: FAMILY MEDICINE

## 2023-10-12 PROCEDURE — 3008F PR BODY MASS INDEX (BMI) DOCUMENTED: ICD-10-PCS | Mod: CPTII,S$GLB,, | Performed by: FAMILY MEDICINE

## 2023-10-12 PROCEDURE — 1159F PR MEDICATION LIST DOCUMENTED IN MEDICAL RECORD: ICD-10-PCS | Mod: CPTII,S$GLB,, | Performed by: FAMILY MEDICINE

## 2023-10-12 PROCEDURE — 99999 PR PBB SHADOW E&M-EST. PATIENT-LVL IV: ICD-10-PCS | Mod: PBBFAC,,, | Performed by: FAMILY MEDICINE

## 2023-10-12 PROCEDURE — 3078F DIAST BP <80 MM HG: CPT | Mod: CPTII,S$GLB,, | Performed by: FAMILY MEDICINE

## 2023-10-12 PROCEDURE — 99396 PR PREVENTIVE VISIT,EST,40-64: ICD-10-PCS | Mod: S$GLB,,, | Performed by: FAMILY MEDICINE

## 2023-10-12 PROCEDURE — 99396 PREV VISIT EST AGE 40-64: CPT | Mod: S$GLB,,, | Performed by: FAMILY MEDICINE

## 2023-10-12 PROCEDURE — 3074F SYST BP LT 130 MM HG: CPT | Mod: CPTII,S$GLB,, | Performed by: FAMILY MEDICINE

## 2023-10-12 RX ORDER — LORAZEPAM 0.5 MG/1
0.5 TABLET ORAL DAILY PRN
Qty: 30 TABLET | Refills: 0 | Status: SHIPPED | OUTPATIENT
Start: 2023-10-12 | End: 2023-11-09

## 2023-10-12 RX ORDER — AMITRIPTYLINE HYDROCHLORIDE 50 MG/1
TABLET, FILM COATED ORAL
Qty: 90 TABLET | Refills: 3 | Status: SHIPPED | OUTPATIENT
Start: 2023-10-12 | End: 2024-03-02 | Stop reason: SDUPTHER

## 2023-10-12 RX ORDER — DUTASTERIDE 0.5 MG/1
0.5 CAPSULE, LIQUID FILLED ORAL
COMMUNITY
Start: 2023-08-18

## 2023-10-12 RX ORDER — ESCITALOPRAM OXALATE 10 MG/1
10 TABLET ORAL DAILY
Qty: 90 TABLET | Refills: 3 | Status: SHIPPED | OUTPATIENT
Start: 2023-10-12 | End: 2024-03-02 | Stop reason: SDUPTHER

## 2023-10-12 NOTE — PROGRESS NOTES
"CHIEF COMPLAINT:   Annual    HISTORY OF PRESENT ILLNESS: The patient is a very pleasant 48-year-old white female.  She presents for annual.  She is doing well overall.      REVIEW OF SYSTEMS:  GENERAL: No fever, chills, fatigability or weight loss.  SKIN: No rashes, itching or changes in color or texture of skin.  HEAD: The patient does not have headaches and recent head trauma.  EYES: Visual acuity fine. No photophobia, ocular pain or diplopia.  EARS: Denies ear pain, discharge or vertigo.  NOSE: No loss of smell, no epistaxis or postnasal drip.  MOUTH & THROAT: No hoarseness or change in voice. No excessive gum bleeding.  NODES: Denies swollen glands.  CHEST: Denies ROJO, cyanosis, wheezing, cough and sputum production.  CARDIOVASCULAR: Denies chest pain, PND, orthopnea or reduced exercise tolerance.  ABDOMEN: Appetite decreased. No weight loss. Denies diarrhea, hematemesis or blood in stool.    URINARY: No flank pain or hematuria.  PERIPHERAL VASCULAR: No claudication or cyanosis.  MUSCULOSKELETAL: No joint stiffness or swelling. Denies back pain.  NEUROLOGIC: No history of seizures, paralysis, alteration of gait or coordination.    SOCIAL HISTORY:  She does not smoke.  She consumes alcohol socially.  She is .    PHYSICAL EXAMINATION:   Blood pressure (!) 89/60, pulse 78, height 5' 8" (1.727 m), weight 58 kg (127 lb 12.1 oz), last menstrual period 01/22/2019, SpO2 98 %.  APPEARANCE: Well nourished, well developed, in no acute distress.    HEAD: Normocephalic, atraumatic.  EYES: PERRL. EOMI.  Conjunctivae without injection and  anicteric  NOSE: Mucosa pink. Airway clear.  MOUTH & THROAT: No tonsillar enlargement. No pharyngeal erythema or exudate. No stridor.  NECK: Supple.   NODES: No cervical, axillary or inguinal lymph node enlargement.  ABDOMEN: Bowel sounds normal. Not distended. Soft. No guarding or rebound tenderness or masses.  No ascites is noted.  MUSCULOSKELETAL:  There is no clubbing, cyanosis, " or edema of the extremities x4.  There is full range of motion of the lumbar spine.  There is full range of motion of the extremities x4.  There is no deformity noted.    NEUROLOGIC:       Dev Coma Scale 15      Normal speech development.      Hearing normal.      Normal gait.      Motor and sensory exams grossly normal.  PSYCHIATRIC: Patient is alert and oriented x3.  Thought processes are all normal.  There is no homicidality.  There is no suicidality.  There is no evidence of psychosis.    LABORATORY/RADIOLOGY:Chart reviewed.    ASSESSMENT:   Annual  Depression and anxiety, resolved  Insomnia, mild    PLAN:   We will follow-up blood work which we expect to be normal.  Return to clinic in a year

## 2023-11-09 ENCOUNTER — OFFICE VISIT (OUTPATIENT)
Dept: UROGYNECOLOGY | Facility: CLINIC | Age: 48
End: 2023-11-09
Payer: COMMERCIAL

## 2023-11-09 VITALS
SYSTOLIC BLOOD PRESSURE: 95 MMHG | WEIGHT: 131.63 LBS | BODY MASS INDEX: 19.95 KG/M2 | DIASTOLIC BLOOD PRESSURE: 66 MMHG | HEART RATE: 77 BPM | HEIGHT: 68 IN

## 2023-11-09 DIAGNOSIS — R39.15 URINARY URGENCY: ICD-10-CM

## 2023-11-09 DIAGNOSIS — Z01.419 WELL WOMAN EXAM: Primary | ICD-10-CM

## 2023-11-09 DIAGNOSIS — N95.2 VAGINAL ATROPHY: ICD-10-CM

## 2023-11-09 PROCEDURE — 3074F SYST BP LT 130 MM HG: CPT | Mod: CPTII,S$GLB,, | Performed by: NURSE PRACTITIONER

## 2023-11-09 PROCEDURE — 3078F PR MOST RECENT DIASTOLIC BLOOD PRESSURE < 80 MM HG: ICD-10-PCS | Mod: CPTII,S$GLB,, | Performed by: NURSE PRACTITIONER

## 2023-11-09 PROCEDURE — 99999 PR PBB SHADOW E&M-EST. PATIENT-LVL IV: ICD-10-PCS | Mod: PBBFAC,,, | Performed by: NURSE PRACTITIONER

## 2023-11-09 PROCEDURE — 99396 PREV VISIT EST AGE 40-64: CPT | Mod: S$GLB,,, | Performed by: NURSE PRACTITIONER

## 2023-11-09 PROCEDURE — 87086 URINE CULTURE/COLONY COUNT: CPT | Performed by: NURSE PRACTITIONER

## 2023-11-09 PROCEDURE — 1159F MED LIST DOCD IN RCRD: CPT | Mod: CPTII,S$GLB,, | Performed by: NURSE PRACTITIONER

## 2023-11-09 PROCEDURE — 99999 PR PBB SHADOW E&M-EST. PATIENT-LVL IV: CPT | Mod: PBBFAC,,, | Performed by: NURSE PRACTITIONER

## 2023-11-09 PROCEDURE — 1160F PR REVIEW ALL MEDS BY PRESCRIBER/CLIN PHARMACIST DOCUMENTED: ICD-10-PCS | Mod: CPTII,S$GLB,, | Performed by: NURSE PRACTITIONER

## 2023-11-09 PROCEDURE — 99396 PR PREVENTIVE VISIT,EST,40-64: ICD-10-PCS | Mod: S$GLB,,, | Performed by: NURSE PRACTITIONER

## 2023-11-09 PROCEDURE — 3008F BODY MASS INDEX DOCD: CPT | Mod: CPTII,S$GLB,, | Performed by: NURSE PRACTITIONER

## 2023-11-09 PROCEDURE — 3078F DIAST BP <80 MM HG: CPT | Mod: CPTII,S$GLB,, | Performed by: NURSE PRACTITIONER

## 2023-11-09 PROCEDURE — 1159F PR MEDICATION LIST DOCUMENTED IN MEDICAL RECORD: ICD-10-PCS | Mod: CPTII,S$GLB,, | Performed by: NURSE PRACTITIONER

## 2023-11-09 PROCEDURE — 3074F PR MOST RECENT SYSTOLIC BLOOD PRESSURE < 130 MM HG: ICD-10-PCS | Mod: CPTII,S$GLB,, | Performed by: NURSE PRACTITIONER

## 2023-11-09 PROCEDURE — 1160F RVW MEDS BY RX/DR IN RCRD: CPT | Mod: CPTII,S$GLB,, | Performed by: NURSE PRACTITIONER

## 2023-11-09 PROCEDURE — 3008F PR BODY MASS INDEX (BMI) DOCUMENTED: ICD-10-PCS | Mod: CPTII,S$GLB,, | Performed by: NURSE PRACTITIONER

## 2023-11-09 RX ORDER — ESTRADIOL 0.1 MG/G
1 CREAM VAGINAL DAILY
Qty: 42.5 G | Refills: 3 | Status: SHIPPED | OUTPATIENT
Start: 2023-11-09 | End: 2024-03-13

## 2023-11-09 NOTE — PATIENT INSTRUCTIONS
1. Well woman  --up to date    2. Mixed urinary incontinence, urge > stress:    --Empty bladder every 3 hours.  Empty well: wait a minute, lean forward on toilet.    --Avoid dietary irritants (see sheet).  Keep diary x 3-5 days to determine your irritants.  --KEGELS: do 10 in AM and 10 in PM, holding each x 10 seconds.  When you feel urge to go, STOP, KEGEL, and when urge has passed, then go to bathroom.  Consider PT in future.    --URGE: consider anticholinergic.  Takes 2-4 weeks to see if will have effect.  For dry mouth: get sour, sugar free lozenge or gum.    --STRESS:  Pessary vs. Sling.     3. Vaginal atrophy  ----use dime size amount in vagina-- insert to your second knuckle and rub around just inside vaginal opening nightly x 2 weeks then twice weekly    4. Recent uti:  --urine C&S sent today  --If you feel like you have a UTI, please call our office so that we can place an order for you to drop off a urine specimen at the closest [facility] lab (we will arrange).     --We will call in antibiotics for you to start right after you drop off specimen.     --In this way, we can determine:     1)  Do you have a UTI?      2) If you have a UTI, is it sensitive to the antibiotics we prescribed?   --follow UTI prevention tips (see attached)  --control bowel movements/fecal cross-contamination  --treat vaginal atrophy (dryness)  --empty bladder before and after intercourse  --consider need for further evaluation (pelvic imaging and cystoscopy) if issue persists    5. RTC 1 year for annual

## 2023-11-09 NOTE — PROGRESS NOTES
11/09/2023    SUBJECTIVE:   48 y.o. female for annual exam.    OPERATIVE NOTE  12/07/2020  Title of Operation:  1)  Robotic-assisted total laparoscopic hysterectomy with bilateral salpingectomy  2)  Cystourethroscopy     Indications for Surgery:  Lynne Velsaco is a 45 y.o. here for a urogyn follow up for incomplete bladder emptying.  1000+ urinary retention suddenly 2 days ago.  Has been having intermittent constipation. Occasionally feels like she is not emptying her bladder     Pelvic:  Ext. Genitalia: normal external genitalia. Normal bartholin's and skeens glands  Vagina: + (mild)  atrophy. Normal vaginal mucosa without lesions. No discharge noted.   Non-tender bladder base without palpable mass.  Cervix: no lesions  Uterus:  enlarged to 12 week's size; multiple fibriods palpable--bulky, anterior fibroid palpable to UVJ  (most of anterior vaginal wall) +TTP in LLQ  Urethra: no masses or tenderness  Urethral meatus: no lesions, caruncle or prolapse. Resistance inserting catheter     POP-Q:  Aa -1; Ba -1; C -6; Ap 0; Bp 0; D -8.  Genital hiatus 2, perineal body 2 total vaginal length 11.        Last HPI from 06/10/2019     1. Urinary urgency vs. Frequent uti  --denies SUZI  --rare UUI --wearing liner pad occasionally  --voiding every hour  --nocturia 1-2 times/ night-- limits fluids at least 2 hours prior to bedtime      2. Constipation  --still having some issues: will start fiber  --eats high fiber diet  --incomplete evacuation+     3)recurrent uti  --asymptomatic at this  2/28/2019 e. coli  11/26/2018 e. coli  11/20/2017 e. Coli     11/04/2020  Suds  Urine dipstick: neg.     1.  VOIDING PHASE:       a.  Uroflowmetry:  Prolapse reduction: No  Voided volume:  96 mL   PVR:   25 mL     The overall configuration of this uroflow study was with insufficient volume for interpretation.       b.  Pressure flow:  Prolapse reduction: No  Voided volume:   532 mL  Voiding time:   >5 min   Peak flow:  12 mL/s    Avg flow:  2 mL/s  Max det pressure:  52  cm H20  Det pressure at max flow: negative reading as pves catheter was dislodged during study.   Void initiated by detrusor contraction.    Urethral relaxation (EMG):  absent.    PVR (calculated):  68 mL     The overall configuration of this pressure flow study was prolonged with concern for voiding dysfunction.       2.  FILLING PHASE:  1st desire: 31 mL  Normal desire:  150 mL  Strong desire:  410 mL  Urgency:  600 mL  Compliance (calculated)  approx 100 mL/cm H20  EMG activity during filling:  stable  Detrusor contractions observed: No.      3.  URETHRAL FUNCTION/STORAGE PHASE:     a.  WITH prolapse reduction:  CLPP (150 mL): Negative  at  94 cm H20  VLPP (150 mL): Negative  at  84 cm H20   CLPP (300 mL): Negative  at  98 cm H20  VLPP (300 mL): Negative  at  98 cm H20   CLPP (450 mL): Negative  at  97 cm H20  VLPP (450 mL): Negative  at  76 cm H20   CLPP (MAX ):    Negative  at  75 cm H20  VLPP (MAX):     Negative  at  102 cm H20  NEG CLPP and VLPP at max cap once pves catheter removed     These findings are consistent with Negative urodynamic stress incontinence.     Assessment:  UF with insufficient volume for interpretation.  PF prolonged with concern for voiding dysfunction.  Compliance normal.  Max capacity 600 mL.  DO (--).  SUZI (--).       Cysto  Findings: Urethroscopy:  Normal.  Cystoscopy:  Normal bladder mucosa--able to see impression of large anterior fibroid just beneath bladder mucosa at bladder base & cephalad to trigone, bilateral ureteral flow was noted.   Assessment: Essentially normal cystourethroscopy with evidence of large, anterior fibroid deep to bladder base.     11/09/2020  Pelvic MRI  UTERUS:   --mid position measuring 11.3 x 9.5 x 10.8 cm cm.   --Endometrial thickness measures not well visualized yet not definitively thickened.   *Fibroids   --Approximately 1 large fibroids seen   --Fibroid enhancement: Minimally heterogeneous   --Largest  fibroid measures 9.2 x 7.1 x 8.1 cm and is located RIGHT mid myometrial level.   CERVIX AND VAGINA: No cervical fibroid.  Nabothian cysts.   OVARIES: Normal.   PELVIS/LOWER ABDOMEN   LYMPH NODES: None enlarged.   URINARY BLADDER: Mildly displaced by the enlarged uterus.   VESSELS: Normal.   MUSCULOSKELETAL: No abnormal marrow signal or muscle edema.   MISCELLANEOUS: None.   Impression:   Dominant large RIGHT mid uterine fibroid measuring 9.2 x 7.1 x 8.1 cm.    Past Medical History:   Diagnosis Date    Abnormal Pap smear 1993    cryo for dysplasia    Anxiety     Depression     History of psychiatric care     prozac, lexapro, ativan    Mental disorder     Depression    Neuromuscular disorder     Psychiatric problem     Therapy        Past Surgical History:   Procedure Laterality Date    AUGMENTATION OF BREAST      saline, 2010    Breast implants 1998 1998    BREAST SURGERY  1999, 2010    implants    COSMETIC SURGERY  1999, 2010    breast implants    Cryo 1993      dental implant      DILATION AND CURETTAGE OF UTERUS      DILATION AND CURETTAGE OF UTERUS      ENDOMETRIAL ABLATION  July 11,2014    Hysteroscopy with Novasure endometrial ablation    EYE SURGERY      lasik    HYSTERECTOMY      ROBOT-ASSISTED LAPAROSCOPIC HYSTERECTOMY N/A 12/7/2020    Procedure: ROBOTIC HYSTERECTOMY;  Surgeon: Juliane Mcdonnell MD;  Location: [facility] OR;  Service: OB/GYN;  Laterality: N/A;    ROBOT-ASSISTED SURGICAL REMOVAL OF FALLOPIAN TUBE USING DA NAV XI Bilateral 12/7/2020    Procedure: XI ROBOTIC SALPINGECTOMY;  Surgeon: Juliane Mcdonnell MD;  Location: [facility] OR;  Service: OB/GYN;  Laterality: Bilateral;       Family History   Problem Relation Age of Onset    Diabetes Father     Stroke Father     Kidney disease Father     Diabetes Mother     Hypertension Mother     Coronary artery disease Mother     Depression Mother     Seizures Mother     Arthritis Mother     Heart disease Mother     Breast cancer Maternal Aunt     Cancer Maternal Aunt      Miscarriages / Stillbirths Maternal Aunt     Breast cancer Maternal Aunt     Mental illness Paternal Grandmother     Mental illness Maternal Grandfather     Breast cancer Paternal Aunt     Ovarian cancer Other         maternal great aunt    COPD Maternal Aunt     Heart disease Brother     Colon cancer Neg Hx     Suicide Neg Hx        Social History     Socioeconomic History    Marital status:    Occupational History     Employer: [employer]   Tobacco Use    Smoking status: Former     Current packs/day: 0.00     Types: Cigarettes     Quit date: 10/15/1996     Years since quittin.1    Smokeless tobacco: Never   Substance and Sexual Activity    Alcohol use: Yes     Alcohol/week: 4.0 standard drinks of alcohol     Types: 2 Glasses of wine, 2 Standard drinks or equivalent per week     Comment: social    Drug use: No    Sexual activity: Yes     Partners: Male     Birth control/protection: Partner-Vasectomy, Other-see comments     Comment: ablation   Social History Narrative    - 14 years    From [place]    Works for a law firm- - associates degree    3 children     close with mom       Current Outpatient Medications   Medication Sig Dispense Refill    amitriptyline (ELAVIL) 50 MG tablet TAKE 1 TABLET EVERY NIGHT AS NEEDED FOR INSOMNIA 90 tablet 3    calcium-vitamin D3 (OS-MANJULA 500 + D3) 500 mg-5 mcg (200 unit) per tablet Take 1 tablet by mouth 2 (two) times daily with meals.      dutasteride (AVODART) 0.5 mg capsule Take 0.5 mg by mouth.      EScitalopram oxalate (LEXAPRO) 10 MG tablet Take 1 tablet (10 mg total) by mouth once daily. 90 tablet 3    finasteride (PROSCAR) 5 mg tablet TK 1 T PO QD      LORazepam (ATIVAN) 0.5 MG tablet lorazepam 0.5 mg tablet      melatonin 10 mg Cap Take by mouth.      metroNIDAZOLE (FLAGYL) 500 MG tablet Take 1 tablet (500 mg total) by mouth 2 (two) times daily. 6 tablet 0    minoxidiL (LONITEN) 10 MG Tab Take 2.5 mg by mouth once daily. Half pill daily      pantoprazole  "(PROTONIX) 40 MG tablet TAKE 1 TABLET EVERY DAY 90 tablet 3    propranoloL (INDERAL) 40 MG tablet Take 1 tablet (40 mg total) by mouth 2 (two) times daily. (Patient taking differently: Take 40 mg by mouth once daily.) 60 tablet 2    spironolactone (ALDACTONE) 50 MG tablet Take 50 mg by mouth once daily. Pt taking 2 tablets daily  5    zinc gluconate 50 mg tablet Take 15 mg by mouth once daily.      estradioL (ESTRACE) 0.01 % (0.1 mg/gram) vaginal cream Place 1 g vaginally once daily. 42.5 g 3     No current facility-administered medications for this visit.       Review of patient's allergies indicates:   Allergen Reactions    Wool Hives    Effexor [venlafaxine] Palpitations       Patient's last menstrual period was 2019 (approximate).    Well Woman:  Pap test: Post hysterectomy 2018 normal neg hpv.  History of abnormal paps: Yes - cryo in the past.  History of STIs:  No  Mammogram: Date of last: 10/2023 normal  Colonoscopy: 2022 internal hemorrhoids    OB History          4    Para   3    Term   3            AB   1    Living   3         SAB   1    IAB        Ectopic        Multiple        Live Births   3                   ROS:  Feeling well.   No dyspnea or chest pain on exertion.    No abdominal pain, change in bowel habits, black or bloody stools.  Very intermittent constipation.  Has not been taking fiber supplement  Rare SUZI.  Does not feel like she empties all the time.  GYN ROS: no breast pain or new or enlarging lumps on self exam, no vaginal bleeding, she complains of scraping feeling inside of vagina.   No neurological complaints.    OBJECTIVE:   The patient appears well, alert, oriented x 3, in no distress.  BP 95/66 (BP Location: Right arm, Patient Position: Sitting, BP Method: Medium (Automatic))   Pulse 77   Ht 5' 8" (1.727 m)   Wt 59.7 kg (131 lb 9.8 oz)   LMP 2019 (Approximate)   BMI 20.01 kg/m²   ENT normal.  Neck supple. No adenopathy or thyromegaly. JOANIE. "   Normal respiratory effort.  Pulse with regular rate and rhythm.   Abdomen soft without tenderness, guarding, mass or organomegaly.   Extremities show no edema, normal peripheral pulses.   Neurological is normal, no focal findings.    BREAST EXAM: bilateral implants, no palpable abnormalities otherwise    PELVIC EXAM:   VULVA: normal appearing vulva with no masses, tenderness or lesions,   VAGINA: no lesions, no pop  CERVIX: surgically absent,   UTERUS: absent,   ADNEXA: no masses,   RECTAL: deferred    ASSESSMENT:   1. Well woman exam        2. Vaginal atrophy  estradioL (ESTRACE) 0.01 % (0.1 mg/gram) vaginal cream      3. Urinary urgency  CULTURE, URINE            PLAN:   1. Well woman  --up to date    2. Mixed urinary incontinence, urge > stress:    --Empty bladder every 3 hours.  Empty well: wait a minute, lean forward on toilet.    --Avoid dietary irritants (see sheet).  Keep diary x 3-5 days to determine your irritants.  --KEGELS: do 10 in AM and 10 in PM, holding each x 10 seconds.  When you feel urge to go, STOP, KEGEL, and when urge has passed, then go to bathroom.  Consider PT in future.    --URGE: consider anticholinergic.  Takes 2-4 weeks to see if will have effect.  For dry mouth: get sour, sugar free lozenge or gum.    --STRESS:  Pessary vs. Sling.     3. Vaginal atrophy  ----use dime size amount in vagina-- insert to your second knuckle and rub around just inside vaginal opening nightly x 2 weeks then twice weekly    4. Recent uti:  --urine C&S sent today  --If you feel like you have a UTI, please call our office so that we can place an order for you to drop off a urine specimen at the closest [facility] lab (we will arrange).     --We will call in antibiotics for you to start right after you drop off specimen.     --In this way, we can determine:     1)  Do you have a UTI?      2) If you have a UTI, is it sensitive to the antibiotics we prescribed?   --follow UTI prevention tips (see  attached)  --control bowel movements/fecal cross-contamination  --treat vaginal atrophy (dryness)  --empty bladder before and after intercourse  --consider need for further evaluation (pelvic imaging and cystoscopy) if issue persists    5. RTC 1 year for annual      30 minutes were spent in face to face time with this patient  90 % of this time was spent in counseling and/or coordination of care    Es TORRES Marchand [facility]  Division of Female Pelvic Medicine and Reconstructive Surgery  Department of Obstetrics & Gynecology

## 2023-11-10 LAB — BACTERIA UR CULT: NO GROWTH

## 2023-11-13 ENCOUNTER — PATIENT MESSAGE (OUTPATIENT)
Dept: UROGYNECOLOGY | Facility: CLINIC | Age: 48
End: 2023-11-13
Payer: COMMERCIAL

## 2023-12-13 ENCOUNTER — TELEPHONE (OUTPATIENT)
Dept: PAIN MEDICINE | Facility: CLINIC | Age: 48
End: 2023-12-13

## 2023-12-13 ENCOUNTER — OFFICE VISIT (OUTPATIENT)
Dept: PAIN MEDICINE | Facility: CLINIC | Age: 48
End: 2023-12-13
Payer: COMMERCIAL

## 2023-12-13 VITALS
HEART RATE: 81 BPM | TEMPERATURE: 98 F | WEIGHT: 129.63 LBS | SYSTOLIC BLOOD PRESSURE: 101 MMHG | RESPIRATION RATE: 18 BRPM | DIASTOLIC BLOOD PRESSURE: 71 MMHG | BODY MASS INDEX: 19.71 KG/M2 | OXYGEN SATURATION: 98 %

## 2023-12-13 DIAGNOSIS — G43.709 CHRONIC MIGRAINE WITHOUT AURA WITHOUT STATUS MIGRAINOSUS, NOT INTRACTABLE: Primary | ICD-10-CM

## 2023-12-13 PROCEDURE — 99999 PR PBB SHADOW E&M-EST. PATIENT-LVL IV: ICD-10-PCS | Mod: PBBFAC,,, | Performed by: ANESTHESIOLOGY

## 2023-12-13 PROCEDURE — 99999 PR PBB SHADOW E&M-EST. PATIENT-LVL IV: CPT | Mod: PBBFAC,,, | Performed by: ANESTHESIOLOGY

## 2023-12-13 PROCEDURE — 99499 UNLISTED E&M SERVICE: CPT | Mod: S$GLB,,, | Performed by: ANESTHESIOLOGY

## 2023-12-13 PROCEDURE — 99499 NO LOS: ICD-10-PCS | Mod: S$GLB,,, | Performed by: ANESTHESIOLOGY

## 2023-12-13 NOTE — PROGRESS NOTES
Patient Name: Lynne Velasco  MRN: [id number]    INFORMED CONSENT: The procedure, risks, benefits and options were discussed with patient. There are no contraindications to the procedure. The patient expressed understanding and agreed to proceed. The personnel performing the procedure was discussed. I verify that I personally obtained Lynne's consent prior to the start of the procedure and the signed consent can be found on the patient's chart.    Procedure Date: 12/13/2023    Anesthesia: Topical    Sedation: None    Pre-operative diagnosis: chronic migraines  Post-operative diagnosis: Same       Procedure: Chemical neurolysis   Risks and benefits were explained including bleeding, infection, worsening of pain, damage to the areas being injected, weakness of muscles, loss of muscle control, dysphagia if injecting the head or neck, facial droop if injecting the facial area, painful injection, allergic or other reaction to the medications being injected, and the failure of the procedure to help the problem. A signed consent was obtained.     The patient was placed in a comfortable area and the sites to be treated were identified. A time out was performed. The area to be treated was prepped three times with alcohol and the alcohol allowed to dry. The muscles were flexed and marked. A 27G 1/2 inch needle was used to inject the medication into the muscles described below.     Injection sites: 31   muscle bilaterally (a total of 10 units divided into 2 sites)  Procerus muscle (a total of 5 units at 1 site)  Frontalis muscle bilaterally (a total of 20 units divided into 4 sites)  Temporalis muscle bilaterally (a total of 40 units divided into 8 sites)   Occipitalis muscle bilaterally (a total of 40 units divided into 8 sites)  Cervical paraspinal muscles (a total of 20 units divided into 4 sites)   Trapezius muscle bilaterally (a total of 30 units divided into 6 sites)     Total Botox used: 155 Units    Botox wastage: 45 Units     Lot: [id number]  Exp: 2026/03    Complications: None  Disposition: Pt tolerated the procedure well and was monitored for 15 minutes afterwards prior to discharge.   RTC for the next Botox injection: 3 months     Marsha Hutchinson (Una Name: Shravan), MD  12/13/2023

## 2024-03-02 DIAGNOSIS — F32.A DEPRESSION, UNSPECIFIED DEPRESSION TYPE: ICD-10-CM

## 2024-03-02 DIAGNOSIS — G47.00 INSOMNIA, UNSPECIFIED TYPE: ICD-10-CM

## 2024-03-02 DIAGNOSIS — M62.89 PELVIC FLOOR TENSION: ICD-10-CM

## 2024-03-02 NOTE — TELEPHONE ENCOUNTER
No care due was identified.  [facility] Embedded Care Due Messages. Reference number: [id number].   3/02/2024 2:34:13 PM CST

## 2024-03-05 RX ORDER — AMITRIPTYLINE HYDROCHLORIDE 50 MG/1
TABLET, FILM COATED ORAL
Qty: 90 TABLET | Refills: 3 | Status: SHIPPED | OUTPATIENT
Start: 2024-03-05

## 2024-03-05 RX ORDER — ESCITALOPRAM OXALATE 10 MG/1
10 TABLET ORAL DAILY
Qty: 90 TABLET | Refills: 3 | Status: SHIPPED | OUTPATIENT
Start: 2024-03-05

## 2024-03-06 ENCOUNTER — PATIENT MESSAGE (OUTPATIENT)
Dept: PAIN MEDICINE | Facility: CLINIC | Age: 49
End: 2024-03-06
Payer: COMMERCIAL

## 2024-03-06 RX ORDER — PANTOPRAZOLE SODIUM 40 MG/1
40 TABLET, DELAYED RELEASE ORAL DAILY
Qty: 90 TABLET | Refills: 3 | Status: SHIPPED | OUTPATIENT
Start: 2024-03-06

## 2024-03-06 NOTE — TELEPHONE ENCOUNTER
Refills requested on protonix 40 mg QD; last refills given on 08/29/23 90 tablets with 3 refills; last office visit on 06/16/2022

## 2024-03-13 ENCOUNTER — OFFICE VISIT (OUTPATIENT)
Dept: PAIN MEDICINE | Facility: CLINIC | Age: 49
End: 2024-03-13
Payer: COMMERCIAL

## 2024-03-13 VITALS
WEIGHT: 130.06 LBS | HEIGHT: 67 IN | SYSTOLIC BLOOD PRESSURE: 101 MMHG | BODY MASS INDEX: 20.41 KG/M2 | DIASTOLIC BLOOD PRESSURE: 65 MMHG | HEART RATE: 84 BPM | OXYGEN SATURATION: 100 % | TEMPERATURE: 98 F | RESPIRATION RATE: 20 BRPM

## 2024-03-13 DIAGNOSIS — G43.709 CHRONIC MIGRAINE WITHOUT AURA WITHOUT STATUS MIGRAINOSUS, NOT INTRACTABLE: Primary | ICD-10-CM

## 2024-03-13 PROCEDURE — 99499 UNLISTED E&M SERVICE: CPT | Mod: S$GLB,,, | Performed by: ANESTHESIOLOGY

## 2024-03-13 PROCEDURE — 99999 PR PBB SHADOW E&M-EST. PATIENT-LVL III: CPT | Mod: PBBFAC,,, | Performed by: ANESTHESIOLOGY

## 2024-03-13 NOTE — PROGRESS NOTES
Patient Name: Lynne Velasco  MRN: [id number]    INFORMED CONSENT: The procedure, risks, benefits and options were discussed with patient. There are no contraindications to the procedure. The patient expressed understanding and agreed to proceed. The personnel performing the procedure was discussed. I verify that I personally obtained Lynne's consent prior to the start of the procedure and the signed consent can be found on the patient's chart.    Procedure Date: 03/13/2024    Anesthesia: Topical    Sedation: None    Pre-operative diagnosis: chronic migraines  Post-operative diagnosis: Same       Procedure: Chemical neurolysis   Risks and benefits were explained including bleeding, infection, worsening of pain, damage to the areas being injected, weakness of muscles, loss of muscle control, dysphagia if injecting the head or neck, facial droop if injecting the facial area, painful injection, allergic or other reaction to the medications being injected, and the failure of the procedure to help the problem. A signed consent was obtained.     The patient was placed in a comfortable area and the sites to be treated were identified. A time out was performed. The area to be treated was prepped three times with alcohol and the alcohol allowed to dry. The muscles were flexed and marked. A 30G 1/2 inch needle was used to inject the medication into the muscles described below.     Injection sites: 31   muscle bilaterally (a total of 10 units divided into 2 sites)  Procerus muscle (a total of 5 units at 1 site)  Frontalis muscle bilaterally (a total of 20 units divided into 4 sites)  Temporalis muscle bilaterally (a total of 40 units divided into 8 sites)   Occipitalis muscle bilaterally (a total of 40 units divided into 8 sites)  Cervical paraspinal muscles (a total of 20 units divided into 4 sites)   Trapezius muscle bilaterally (a total of 30 units divided into 6 sites)     Total Botox used: 155  Units   Botox wastage: 45 Units     Lot: [id number]  Exp: 2026/03    Complications: None  Disposition: Pt tolerated the procedure well and was monitored for 15 minutes afterwards prior to discharge.   RTC for the next Botox injection: 3 months     Sully Palafox MD  03/13/2024

## 2024-03-14 ENCOUNTER — PATIENT MESSAGE (OUTPATIENT)
Dept: INTERNAL MEDICINE | Facility: CLINIC | Age: 49
End: 2024-03-14
Payer: COMMERCIAL

## 2024-03-14 DIAGNOSIS — G43.909 MIGRAINE WITHOUT STATUS MIGRAINOSUS, NOT INTRACTABLE, UNSPECIFIED MIGRAINE TYPE: Primary | ICD-10-CM

## 2024-03-20 RX ORDER — SUMATRIPTAN SUCCINATE 25 MG/1
100 TABLET ORAL
Qty: 30 TABLET | Refills: 0 | Status: SHIPPED | OUTPATIENT
Start: 2024-03-20 | End: 2024-03-26

## 2024-03-20 NOTE — TELEPHONE ENCOUNTER
No care due was identified.  [facility] Embedded Care Due Messages. Reference number: [id number].   3/20/2024 7:40:38 AM CDT

## 2024-03-21 ENCOUNTER — PATIENT MESSAGE (OUTPATIENT)
Dept: INTERNAL MEDICINE | Facility: CLINIC | Age: 49
End: 2024-03-21
Payer: COMMERCIAL

## 2024-03-26 ENCOUNTER — OFFICE VISIT (OUTPATIENT)
Dept: NEUROLOGY | Facility: CLINIC | Age: 49
End: 2024-03-26
Payer: COMMERCIAL

## 2024-03-26 VITALS
SYSTOLIC BLOOD PRESSURE: 104 MMHG | DIASTOLIC BLOOD PRESSURE: 74 MMHG | HEART RATE: 75 BPM | WEIGHT: 127.88 LBS | HEIGHT: 67 IN | BODY MASS INDEX: 20.07 KG/M2

## 2024-03-26 DIAGNOSIS — G43.119 INTRACTABLE MIGRAINE WITH AURA WITHOUT STATUS MIGRAINOSUS: Primary | ICD-10-CM

## 2024-03-26 DIAGNOSIS — R51.9 WORSENING HEADACHES: ICD-10-CM

## 2024-03-26 PROCEDURE — 99999 PR PBB SHADOW E&M-EST. PATIENT-LVL III: CPT | Mod: PBBFAC,,, | Performed by: STUDENT IN AN ORGANIZED HEALTH CARE EDUCATION/TRAINING PROGRAM

## 2024-03-26 PROCEDURE — 3078F DIAST BP <80 MM HG: CPT | Mod: CPTII,S$GLB,, | Performed by: STUDENT IN AN ORGANIZED HEALTH CARE EDUCATION/TRAINING PROGRAM

## 2024-03-26 PROCEDURE — 3074F SYST BP LT 130 MM HG: CPT | Mod: CPTII,S$GLB,, | Performed by: STUDENT IN AN ORGANIZED HEALTH CARE EDUCATION/TRAINING PROGRAM

## 2024-03-26 PROCEDURE — 3008F BODY MASS INDEX DOCD: CPT | Mod: CPTII,S$GLB,, | Performed by: STUDENT IN AN ORGANIZED HEALTH CARE EDUCATION/TRAINING PROGRAM

## 2024-03-26 PROCEDURE — 1160F RVW MEDS BY RX/DR IN RCRD: CPT | Mod: CPTII,S$GLB,, | Performed by: STUDENT IN AN ORGANIZED HEALTH CARE EDUCATION/TRAINING PROGRAM

## 2024-03-26 PROCEDURE — 1159F MED LIST DOCD IN RCRD: CPT | Mod: CPTII,S$GLB,, | Performed by: STUDENT IN AN ORGANIZED HEALTH CARE EDUCATION/TRAINING PROGRAM

## 2024-03-26 PROCEDURE — 99204 OFFICE O/P NEW MOD 45 MIN: CPT | Mod: S$GLB,,, | Performed by: STUDENT IN AN ORGANIZED HEALTH CARE EDUCATION/TRAINING PROGRAM

## 2024-03-26 RX ORDER — RIZATRIPTAN BENZOATE 10 MG/1
TABLET, ORALLY DISINTEGRATING ORAL
Qty: 16 TABLET | Refills: 0 | Status: SHIPPED | OUTPATIENT
Start: 2024-03-26

## 2024-03-26 NOTE — Clinical Note
Hi Dr. Palafox,  Just saw Ms Velasco, seems that her Botox is wearing off at 10 weeks. I'd recommend shortening the interval (q11 or q10 weeks) as long as insurance approves.  Thank you, GL

## 2024-03-26 NOTE — PROGRESS NOTES
Patient ID: [id number]  Referring Physician: Jf Kou*    Chief Complaint/Reason for Consult: Headaches  Subjective:     HPI:  Lynne Velasco is a 48 y.o. LH female with NEHEMIAS/depression and migraines. she is presenting today as a new patient for evaluation of change in headaches.     Headache history  Age at onset:   Course over time: increasing in frequency  Location: alternating unilateral temple   Character: throbbing  Intensity: 6-10 on a scale from 1 to 10  Frequency:  EOD .   Duration:  whole  day  Timing:    Mild/moderate/severe. Work attendance or other daily activities are affected by the headaches.  Aura: preceded by an aura consisting of blurry vision  Associated symptoms: N/V, Photosensitivity, and Phonophobia   Associated neurologic symptoms: dizziness  Precipitating factors:   Aggravating factors: Physical activity , Coughing, and Bending over  Home treatment: Imitrex with little improvement.   ER visits: No  Positive Hx of: Head trauma (concussion in 2015)  Is medication overuse contributing to the patient's current migraine burden: No    She was following with Dr. Freeman for headaches, last seen in 2020. She had completed neck PT in the past.  Headaches had responded beautifully to Botox (received from pain management), she has been on it x1 year.   Recently she feels that severe migraines are recurring 2 weeks before her Botox is due. She gets the headaches EOD and they don't respond to Imitrex.  She reports one recent severe headache more in the occipital area and behind the ear, very abrupt onset, this scared her.     Headache Medication history:  AED Neuromodulators  MAOIs  Ergot Alkaloids    Acetazolamide (Diamox) [] Phenelzine (Nardil) [] Dihydroergotamine (Migranal) []   Carbamazepine (Tegretol) [] Tranylcypromine (Parnate) [] Ergotamine (Ergomar) []   Gabapentin (Neurontin) [] Antihistamine/Serotonergic  Triptans    Lacosamide (Vimpat) [] Cyproheptadine  (Periactin) [] Almotriptan (Axert) []   Lamotrigine (Lamictal) [] Antihypertensives  Eletriptan (Relpax) []   Levatiracetam (Keppra) [] Atenolol (Tenormin) [] Frovatriptan (Frova) []   Oxcarbazepine (Trileptal) [] Bisoprostol (Zebeta) [] Naratriptan (Amerge) []   Phenobarbital [] Candesartan (Atacand) [] Rizatriptan (Maxalt) []     Nebivolol (Bystolic)  Sumatriptan (Imitrex) [x]   Levetiracetam (Keppra)  Cardeilol (Coreg) [] Zolmitriptan (Zomig) []   Phenytoin (Dilantin) [] Diltiazem (Cardizem) []     Pregabalin (Lyrica) [] Lisinopril (Prinivil, Zestril) [] Combo Abortives    Primidone (Mysoline) [] Metoprolol (Toprol) [] BC Powder []   Tiagabine (Gabatril) [] Nadolol (Corgard) [] Butalbital and Acetaminophen (Bupap) []   Topiramate (Topamax)  (Trokendi) [] Nicardipine (Cardene) []     Vigabatrin (Sabril) [] Nimodipine (Nimotop) [] Butalbital, Acetaminophen, and caffeine (Fioricet) [x]   Valproic Acid (Depakote) (Divalproex Sodium) [] Propranolol (Inderal) [x]     Zonisamide (Zonegran) [] Telmisartan (Micardis) [] Butalbital, Aspirin, and caffeine (Fiorinal) []   Benzodiazepines  Timolol (Blocadren) []     Alprazolam (Xanax) [] Verapamil (Calan, Verelan) [] Butalbital, Caffeine, Acetaminophen, and Codeine (Fioricet with Codeine) []   Diazepam (Valium) [] NSAIDs      Lorazepam (Ativan) [] Acetaminophen (Tylenol) []     Clonazepam (Klonopin) [] Acetylsalicylic Acid (Aspirin) [] Butalbital, Caffeine, Aspirin, and Codeine  (Fiorinal with Codeine) []   Antidepressants  Diclofenac (Cambia) []     Amitriptyline (Elavil) [x] Ibuprofen (Motrin) []     Desipramine (Norpramin) [] Indomethacin (Indocin) [] Aspirin, Caffeine, and Acetaminophen (Excedrin) (Goodys) []   Doxepin (Sinequan) [] Ketoprofen (Orudis) []     Fluoxetine (Prozac) [] Ketorolac (Toradol) [] Acetaminophen, Dichloralphenazone, and Isometheptene (Midrin) []   Imipramine (Tofranil) [] Naproxen (Anaprox) (Aleve) []     Nortriptyline (Pamelor) [] Meclofenamic Acid  (Meclomen) []     Venlafaxine (Effexor) [] Meloxicam (Mobic) [] Procedures    Desvenlafazine (Pristiq) [] Monoclonals  Greater occipital nerve block []   Duloxetine (Cymbalta) [] Eptinezumab [] Cervical, Thoracic, Lumbar radiofrequency ablation []   Trazadone [] Erenumab-aooe (Aimovig) [] Spenopalatine ganglion block []   Wellbutrin [] Galcanezumab (Emgality) [] Occipital neuro stimulation []   Protriptyline (Vivactil) [] Fremanazumab-vfrm (Ajovy)  Cervical, Thoracic, Lumbar, Caudal Epidural steroid injection []   Escitalopram (Lexapro) [] Other [] Sacroiliac joint steroid injection []   Celexa [] Memantine (Namenda) [] Transforaminal epidural steroid injection []   Oral CGRP inhibitors  Botox [x] Facet joint injections []   Atogepant (Qulipta) [] Baclofen (Lioresal) [] Cervical, Thoracic, Lumbar medial branch blocks []   Rimegepant (Nurtec) []   Cefaly []   Ubrogepant (Ubrelvy) []   Gamma Core []    []   Iovera []    []   Transcranial Magnetic stimulation []     Review of Systems:  Review of Systems   Eyes:  Positive for photophobia. Negative for blurred vision and double vision.   Gastrointestinal:  Positive for nausea. Negative for vomiting.   Musculoskeletal:  Negative for falls.   Neurological:  Positive for dizziness and headaches. Negative for sensory change and weakness.   All other systems reviewed and are negative.     Past Medical History:  Active Ambulatory Problems     Diagnosis Date Noted    Musculoskeletal disorder involving upper trapezius muscle 09/04/2020    Anxiety     Uterine leiomyoma 12/07/2020    Myalgia 02/26/2021    Lack of coordination 02/26/2021    Scar tissue 02/26/2021     Resolved Ambulatory Problems     Diagnosis Date Noted    Major depression, recurrent 04/30/2013    Generalized anxiety disorder 04/30/2013    Excessive or frequent menstruation 07/11/2014    Cervical strain 05/20/2016    Impaired range of motion of cervical spine 09/04/2020    Chronic tension-type headache, intractable  2020    Rectocele 2020    Midline cystocele 2020     Past Medical History:   Diagnosis Date    Abnormal Pap smear     Depression     History of psychiatric care     Mental disorder     Neuromuscular disorder     Psychiatric problem     Therapy        Allergies:  Review of patient's allergies indicates:   Allergen Reactions    Wool Hives    Effexor [venlafaxine] Palpitations       Pertinent Family History:  Family History   Problem Relation Age of Onset    Diabetes Father     Stroke Father     Kidney disease Father     Diabetes Mother     Hypertension Mother     Coronary artery disease Mother     Depression Mother     Seizures Mother     Arthritis Mother     Heart disease Mother     Breast cancer Maternal Aunt     Cancer Maternal Aunt     Miscarriages / Stillbirths Maternal Aunt     Breast cancer Maternal Aunt     Mental illness Paternal Grandmother     Mental illness Maternal Grandfather     Breast cancer Paternal Aunt     Ovarian cancer Other         maternal great aunt    COPD Maternal Aunt     Heart disease Brother     Colon cancer Neg Hx     Suicide Neg Hx        Pertinent Social History:  Social History     Tobacco Use    Smoking status: Former     Current packs/day: 0.00     Types: Cigarettes     Quit date: 10/15/1996     Years since quittin.4    Smokeless tobacco: Never   Substance Use Topics    Alcohol use: Yes     Alcohol/week: 4.0 standard drinks of alcohol     Types: 2 Glasses of wine, 2 Standard drinks or equivalent per week     Comment: social    Drug use: No       Medications:  Current Outpatient Medications   Medication Instructions    amitriptyline (ELAVIL) 50 MG tablet TAKE 1 TABLET EVERY NIGHT AS NEEDED FOR INSOMNIA    calcium-vitamin D3 (OS-MANJULA 500 + D3) 500 mg-5 mcg (200 unit) per tablet 1 tablet, Oral, 2 times daily with meals    dutasteride (AVODART) 0.5 mg, Oral    EScitalopram oxalate (LEXAPRO) 10 mg, Oral, Daily    finasteride (PROSCAR) 5 mg tablet TK 1 T PO QD     LORazepam (ATIVAN) 0.5 MG tablet lorazepam 0.5 mg tablet    melatonin 10 mg Cap Oral    metroNIDAZOLE (FLAGYL) 500 mg, Oral, 2 times daily    minoxidiL (LONITEN) 2.5 mg, Oral, Daily, Half pill daily    pantoprazole (PROTONIX) 40 mg, Oral, Daily    propranoloL (INDERAL) 40 mg, Oral, 2 times daily    spironolactone (ALDACTONE) 50 mg, Oral, Daily, Pt taking 2 tablets daily    sumatriptan (IMITREX) 100 mg, Oral, Every 2 hours PRN    zinc gluconate 15 mg, Oral, Daily        Objective:     Vitals:    03/26/24 0925   BP: 104/74   Pulse: 75        General:  Well-appearing, well-nourished, NAD, cooperative  MSK: no TTP on occipital, cervical paraspinal muscles or traps    Neurologic Exam:   Awake, alert and oriented x3  Speech spontaneous and fluent, intact comprehension.   Adequate fund of knowledge, vocabulary.    CN II - CN XII:  PERRLA. EOM intact. No Nystagmus. No ophthalmoplegia.   Facial sensation is normal to light touch.   Facial expression is full and symmetric.   Hearing is intact bilaterally.   Palate elevates symmetrically.   SCM and Trapezius full strength bilaterally.   Tongue is midline.     Motor:  Normal bulk and tone in all four limbs.   There are no atrophy or fasciculations. No tremor.     Shoulder  Abd Shoulder Add Elbow   Flex Elbow  Ext Wrist   Flex Wrist  Ext Finger  Flex Finger  Ext Finger  Abd Finger   Add IO Opposition   Right 5 5 5 5       5    Left 5 5 5 5       5       Hip  Flex Hip  Ext Thigh   Abd Thigh  Add Knee  Flex Knee  Ext Plantar  Flex Dorsiflex   Right 5 5   5 5 5 5   Left 5 5   5 5 5 5     Sensory:  Light touch: normal tactile sense throughout  Proprioception: proprioceptive sense present on RUE and on LUE  Romberg is Exam: negative    DTRs:   Biceps Brachioradialis Triceps Alem Patellar Ankle Plantar   Right 2+ 2+  - 2+ 2+    Left 2+ 2+  - 2+ 2+      Coordination:  Finger to nose is normal bilaterally.  Normal fine finger movements and rapid alternating  movements.    Gait:  Normal casual and tandem gait.    Pertinent lab results  Lab Results   Component Value Date    GLZZPWHY26 384 09/02/2020    THIAMINEBLOO 64 09/02/2020    VITAMINB6 30 09/02/2020    KECEDFJL42SJ 34 09/02/2020     Lab Results   Component Value Date    TSH 1.721 03/10/2022    FREET4 0.93 11/04/2016    WBC 7.50 03/10/2022    LYMPH 2.2 03/10/2022    LYMPH 29.7 03/10/2022    RBC 4.19 03/10/2022    HGB 12.6 03/10/2022    HCT 37.4 03/10/2022    MCV 89 03/10/2022     03/10/2022     03/10/2022    K 3.9 03/10/2022    CO2 25 03/10/2022    BUN 21 (H) 03/10/2022    CREATININE 0.9 03/10/2022    CALCIUM 9.6 03/10/2022    AST 25 03/10/2022    ALT 21 03/10/2022     Pertinent imaging results  *No relevant recent imaging available to review     Other pertinent studies  None    Assessment:   Lynne Velasco is a 48 y.o. left-handed female with with NEHEMIAS/depression and migraine who presents for evaluation of worsening headaches. Characteristics meet the criteria meet the criteria for migraines with aura. she is having  EOD headaches x2 weeks prior to Botox is due. I will discuss this with her pain specialist to see if they can do Botox q10-11 weeks instead. For rescue, we will do a trial of Maxalt (rizatriptan), since she has lost responsiveness to Imitrex. Lastly, we will obtain imaging of the brain to exclude intracranial pathologies given the change in headaches, thorough neurological exam is reassuring however.     1. Intractable migraine with aura without status migrainosus    2. Worsening headaches      Plan:     - rizatriptan (MAXALT-MLT) 10 MG disintegrating tablet; Take one tablet (10 mg) at the onset of headaches; if symptoms persist or return, may repeat dose after 2 hours. maximum dose: 20 mg per 24 hours  Dispense: 16 tablet; Refill: 0  - MRI Brain W WO Contrast; Future  - Follow up: VV in 6 months to reassess      Disclaimer: This note was partly generated using dictation software  which may occasionally result in transcription errors that are missed on review.    MDM  Problem: Moderate (1 condition with progression)  Data: Moderate (discussion of management with other QHCP)  Management: Moderate (prescription drug)       Pari Oseguera MD    [facility]  03/26/2024

## 2024-04-17 ENCOUNTER — HOSPITAL ENCOUNTER (OUTPATIENT)
Dept: RADIOLOGY | Facility: HOSPITAL | Age: 49
Discharge: HOME | End: 2024-04-17
Attending: STUDENT IN AN ORGANIZED HEALTH CARE EDUCATION/TRAINING PROGRAM
Payer: COMMERCIAL

## 2024-04-17 DIAGNOSIS — R51.9 WORSENING HEADACHES: ICD-10-CM

## 2024-04-17 DIAGNOSIS — G43.119 INTRACTABLE MIGRAINE WITH AURA WITHOUT STATUS MIGRAINOSUS: ICD-10-CM

## 2024-04-17 PROCEDURE — 70553 MRI BRAIN STEM W/O & W/DYE: CPT | Mod: TC

## 2024-04-17 PROCEDURE — 25500020 PHARM REV CODE 255: Performed by: STUDENT IN AN ORGANIZED HEALTH CARE EDUCATION/TRAINING PROGRAM

## 2024-04-17 PROCEDURE — A9585 GADOBUTROL INJECTION: HCPCS | Performed by: STUDENT IN AN ORGANIZED HEALTH CARE EDUCATION/TRAINING PROGRAM

## 2024-04-17 PROCEDURE — 70553 MRI BRAIN STEM W/O & W/DYE: CPT | Mod: 26,,, | Performed by: RADIOLOGY

## 2024-04-17 RX ORDER — GADOBUTROL 604.72 MG/ML
6 INJECTION INTRAVENOUS
Status: COMPLETED | OUTPATIENT
Start: 2024-04-17 | End: 2024-04-17

## 2024-04-17 RX ADMIN — GADOBUTROL 6 ML: 604.72 INJECTION INTRAVENOUS at 06:04

## 2024-05-22 ENCOUNTER — TELEPHONE (OUTPATIENT)
Dept: PAIN MEDICINE | Facility: CLINIC | Age: 49
End: 2024-05-22
Payer: COMMERCIAL

## 2024-05-22 DIAGNOSIS — G43.709 CHRONIC MIGRAINE WITHOUT AURA WITHOUT STATUS MIGRAINOSUS, NOT INTRACTABLE: Primary | ICD-10-CM

## 2024-06-13 ENCOUNTER — OFFICE VISIT (OUTPATIENT)
Dept: PAIN MEDICINE | Facility: CLINIC | Age: 49
End: 2024-06-13
Payer: COMMERCIAL

## 2024-06-13 ENCOUNTER — TELEPHONE (OUTPATIENT)
Dept: PAIN MEDICINE | Facility: CLINIC | Age: 49
End: 2024-06-13

## 2024-06-13 VITALS
DIASTOLIC BLOOD PRESSURE: 65 MMHG | TEMPERATURE: 98 F | HEIGHT: 67 IN | BODY MASS INDEX: 20.07 KG/M2 | HEART RATE: 73 BPM | SYSTOLIC BLOOD PRESSURE: 97 MMHG | RESPIRATION RATE: 18 BRPM | WEIGHT: 127.88 LBS | OXYGEN SATURATION: 100 %

## 2024-06-13 DIAGNOSIS — G43.709 CHRONIC MIGRAINE WITHOUT AURA WITHOUT STATUS MIGRAINOSUS, NOT INTRACTABLE: Primary | ICD-10-CM

## 2024-06-13 PROCEDURE — 99499 UNLISTED E&M SERVICE: CPT | Mod: S$GLB,,, | Performed by: ANESTHESIOLOGY

## 2024-06-13 PROCEDURE — 99999 PR PBB SHADOW E&M-EST. PATIENT-LVL IV: CPT | Mod: PBBFAC,,, | Performed by: ANESTHESIOLOGY

## 2024-06-13 NOTE — PROGRESS NOTES
Patient Name: Lynne Velasco  MRN: [id number]    INFORMED CONSENT: The procedure, risks, benefits and options were discussed with patient. There are no contraindications to the procedure. The patient expressed understanding and agreed to proceed. The personnel performing the procedure was discussed. I verify that I personally obtained Lynne's consent prior to the start of the procedure and the signed consent can be found on the patient's chart.    Procedure Date: 06/13/2024    Anesthesia: Topical    Sedation: None    Pre-operative diagnosis: chronic migraines  Post-operative diagnosis: Same       Procedure: Chemical neurolysis   Risks and benefits were explained including bleeding, infection, worsening of pain, damage to the areas being injected, weakness of muscles, loss of muscle control, dysphagia if injecting the head or neck, facial droop if injecting the facial area, painful injection, allergic or other reaction to the medications being injected, and the failure of the procedure to help the problem. A signed consent was obtained.     The patient was placed in a comfortable area and the sites to be treated were identified. A time out was performed. The area to be treated was prepped three times with alcohol and the alcohol allowed to dry. The muscles were flexed and marked. A 30G 1/2 inch needle was used to inject the medication into the muscles described below.     Injection sites: 31   muscle bilaterally (a total of 10 units divided into 2 sites)  Procerus muscle (a total of 5 units at 1 site)  Frontalis muscle bilaterally (a total of 20 units divided into 4 sites)  Temporalis muscle bilaterally (a total of 40 units divided into 8 sites)   Occipitalis muscle bilaterally (a total of 40 units divided into 8 sites)  Cervical paraspinal muscles (a total of 20 units divided into 4 sites)   Trapezius muscle bilaterally (a total of 30 units divided into 6 sites)     Total Botox used: 155  Units   Botox wasted: 45 Units     Lot: [id number]  Exp: 2026/04    Complications: None  Disposition: Pt tolerated the procedure well and was monitored for 15 minutes afterwards prior to discharge.   RTC for the next Botox injection: 3 months     Elian Caputo MD  06/13/2024

## 2024-06-13 NOTE — TELEPHONE ENCOUNTER
Staff called to get patient scheduled for her 3 month f/u for her botox. Staff scheduled her for 9/18 with Dr. Palafox. Patient is in agreement to the above and verbalized understanding.

## 2024-08-08 DIAGNOSIS — G43.119 INTRACTABLE MIGRAINE WITH AURA WITHOUT STATUS MIGRAINOSUS: ICD-10-CM

## 2024-08-08 DIAGNOSIS — R51.9 WORSENING HEADACHES: ICD-10-CM

## 2024-08-08 RX ORDER — RIZATRIPTAN BENZOATE 10 MG/1
TABLET, ORALLY DISINTEGRATING ORAL
Qty: 16 TABLET | Refills: 5 | Status: SHIPPED | OUTPATIENT
Start: 2024-08-08

## 2024-08-09 ENCOUNTER — PATIENT MESSAGE (OUTPATIENT)
Dept: INTERNAL MEDICINE | Facility: CLINIC | Age: 49
End: 2024-08-09
Payer: COMMERCIAL

## 2024-08-09 DIAGNOSIS — F98.8 ATTENTION DEFICIT DISORDER, UNSPECIFIED HYPERACTIVITY PRESENCE: Primary | ICD-10-CM

## 2024-09-16 ENCOUNTER — PATIENT MESSAGE (OUTPATIENT)
Dept: INTERNAL MEDICINE | Facility: CLINIC | Age: 49
End: 2024-09-16
Payer: COMMERCIAL

## 2024-09-16 DIAGNOSIS — Z82.69 FAMILY HISTORY OF SCLERODERMA: Primary | ICD-10-CM

## 2024-09-17 NOTE — TELEPHONE ENCOUNTER
Spoke to ms. Velasco and patient states that daughter was positive for antibiotic autoimmune allergies which is genetic.  Patient states that daughter was tested for having a increase in antihistamine in her cells.  Scleroderma one of an autoimmune disease.  Patient states that they are waiting on some additional labs for her daughter to return.

## 2024-09-18 ENCOUNTER — OFFICE VISIT (OUTPATIENT)
Dept: PAIN MEDICINE | Facility: CLINIC | Age: 49
End: 2024-09-18
Payer: COMMERCIAL

## 2024-09-18 ENCOUNTER — TELEPHONE (OUTPATIENT)
Dept: PAIN MEDICINE | Facility: CLINIC | Age: 49
End: 2024-09-18
Payer: COMMERCIAL

## 2024-09-18 VITALS
SYSTOLIC BLOOD PRESSURE: 110 MMHG | BODY MASS INDEX: 21.3 KG/M2 | WEIGHT: 136 LBS | HEART RATE: 73 BPM | RESPIRATION RATE: 18 BRPM | OXYGEN SATURATION: 98 % | TEMPERATURE: 99 F | DIASTOLIC BLOOD PRESSURE: 70 MMHG

## 2024-09-18 DIAGNOSIS — G43.709 CHRONIC MIGRAINE WITHOUT AURA WITHOUT STATUS MIGRAINOSUS, NOT INTRACTABLE: Primary | ICD-10-CM

## 2024-09-18 PROCEDURE — 99999 PR PBB SHADOW E&M-EST. PATIENT-LVL IV: CPT | Mod: PBBFAC,,, | Performed by: ANESTHESIOLOGY

## 2024-09-18 PROCEDURE — 99499 UNLISTED E&M SERVICE: CPT | Mod: S$GLB,,, | Performed by: ANESTHESIOLOGY

## 2024-09-18 NOTE — PROGRESS NOTES
Patient Name: Lynne Velasco  MRN: [id number]    INFORMED CONSENT: The procedure, risks, benefits and options were discussed with patient. There are no contraindications to the procedure. The patient expressed understanding and agreed to proceed. The personnel performing the procedure was discussed. I verify that I personally obtained Lynne's consent prior to the start of the procedure and the signed consent can be found on the patient's chart.    Procedure Date: 09/18/2024    Anesthesia: Topical    Sedation: None    Pre-operative diagnosis: chronic migraines  Post-operative diagnosis: Same       Procedure: Chemical neurolysis   Risks and benefits were explained including bleeding, infection, worsening of pain, damage to the areas being injected, weakness of muscles, loss of muscle control, dysphagia if injecting the head or neck, facial droop if injecting the facial area, painful injection, allergic or other reaction to the medications being injected, and the failure of the procedure to help the problem. A signed consent was obtained.     The patient was placed in a comfortable area and the sites to be treated were identified. A time out was performed. The area to be treated was prepped three times with alcohol and the alcohol allowed to dry. The muscles were flexed and marked. A 30G 1/2 inch needle was used to inject the medication into the muscles described below.     Injection sites: 31   muscle bilaterally (a total of 10 units divided into 2 sites)  Procerus muscle (a total of 5 units at 1 site)  Frontalis muscle bilaterally (a total of 20 units divided into 4 sites)  Temporalis muscle bilaterally (a total of 40 units divided into 8 sites)   Occipitalis muscle bilaterally (a total of 40 units divided into 8 sites)  Cervical paraspinal muscles (a total of 20 units divided into 4 sites)   Trapezius muscle bilaterally (a total of 30 units divided into 6 sites)     Total Botox used: 155  Units   Botox wasted: 45 Units     Lot: [id number]  Exp: 2026/08    Complications: None  Disposition: Pt tolerated the procedure well and was monitored for 10 minutes afterwards prior to discharge.   RTC for the next Botox injection: 3 months     Sully Palafox MD  09/18/2024

## 2024-09-25 ENCOUNTER — OFFICE VISIT (OUTPATIENT)
Dept: ALLERGY | Facility: CLINIC | Age: 49
End: 2024-09-25
Payer: COMMERCIAL

## 2024-09-25 VITALS — HEIGHT: 67 IN | WEIGHT: 138.44 LBS | BODY MASS INDEX: 21.73 KG/M2

## 2024-09-25 DIAGNOSIS — J31.0 CHRONIC RHINITIS: ICD-10-CM

## 2024-09-25 DIAGNOSIS — Z82.69 FAMILY HISTORY OF SCLERODERMA: Primary | Chronic | ICD-10-CM

## 2024-09-25 DIAGNOSIS — J32.9 RECURRENT SINUSITIS: ICD-10-CM

## 2024-09-25 DIAGNOSIS — K59.00 CONSTIPATION, UNSPECIFIED CONSTIPATION TYPE: ICD-10-CM

## 2024-09-25 PROCEDURE — 3008F BODY MASS INDEX DOCD: CPT | Mod: CPTII,S$GLB,, | Performed by: ALLERGY & IMMUNOLOGY

## 2024-09-25 PROCEDURE — 99999 PR PBB SHADOW E&M-EST. PATIENT-LVL III: CPT | Mod: PBBFAC,,, | Performed by: ALLERGY & IMMUNOLOGY

## 2024-09-25 PROCEDURE — 99204 OFFICE O/P NEW MOD 45 MIN: CPT | Mod: S$GLB,,, | Performed by: ALLERGY & IMMUNOLOGY

## 2024-09-25 PROCEDURE — 1159F MED LIST DOCD IN RCRD: CPT | Mod: CPTII,S$GLB,, | Performed by: ALLERGY & IMMUNOLOGY

## 2024-09-25 RX ORDER — FLUTICASONE PROPIONATE 50 MCG
SPRAY, SUSPENSION (ML) NASAL
COMMUNITY
Start: 2024-06-12

## 2024-09-25 RX ORDER — MINOXIDIL 2.5 MG/1
2.5 TABLET ORAL
COMMUNITY
Start: 2024-08-29

## 2024-09-25 RX ORDER — BROMPHENIRAMINE MALEATE, PSEUDOEPHEDRINE HYDROCHLORIDE, AND DEXTROMETHORPHAN HYDROBROMIDE 2; 30; 10 MG/5ML; MG/5ML; MG/5ML
5 SYRUP ORAL EVERY 6 HOURS PRN
COMMUNITY
Start: 2024-06-12

## 2024-09-25 RX ORDER — AZITHROMYCIN 250 MG/1
TABLET, FILM COATED ORAL
COMMUNITY
Start: 2024-06-12

## 2024-09-25 NOTE — PROGRESS NOTES
Subjective:       Patient ID: Lynne Velasco is a 49 y.o. female.    Chief Complaint:  No chief complaint on file.  FHx poss autoimmune disease    HPI      Pt presents to discuss screening for possible unspecified autoimmune disease. Her daughter had allergic reaction to PCN during delivery, was given 2 other abx as well. Daughter's blood work possibly showed elevated histamine, and elevated histamine in urine. She may be being evaluated by hematology for possible bone marrow bx. Daughter reportedly has some genetics testing pending. Daughter may have scleroderma.  Another daughter, her youngest daughter, is reportedly a lupus carrier, but is not undergoing any specific medical management.    Pt herself does have hx of migraine HAs. She has hx of dysphagia, relieved after esophageal dilation. Reports recent nl swallow study. No hx urticaria. No asthma.  Sees ENT for rhinitis--saline, flonase not particularly helpful.  Is on abx for sinusitis about twice a year  No hx pneumonia  No OM as adult    Reports wheat, gluten intolerance. Large amts assoc w constipation, cramps. Minimizes but doesn't completely avoid wheat, gluten.        Past Medical History:   Diagnosis Date    Abnormal Pap smear 1993    cryo for dysplasia    Anxiety     Depression     History of psychiatric care     prozac, lexapro, ativan    Mental disorder     Depression    Neuromuscular disorder     Psychiatric problem     Therapy        Family History   Problem Relation Name Age of Onset    Diabetes Father David Abreu Jr.     Stroke Father David Abreu Jr.     Kidney disease Father David Abreu Jr.     Diabetes Mother Jessi Hilario     Hypertension Mother Jessi Hilario     Coronary artery disease Mother Jessi Hilario     Depression Mother Jessi Hilario     Seizures Mother Jessi Hilario     Arthritis Mother Jessi Hilario     Heart disease Mother Jessi Hilario     Breast cancer Maternal Aunt Salomon Betts     Cancer Maternal Aunt Salomon Betts      Miscarriages / Stillbirths Maternal Aunt Salomon Betts     Breast cancer Maternal Aunt      Mental illness Paternal Grandmother Amarilis Abreu     Mental illness Maternal Grandfather Cathy     Breast cancer Paternal Aunt      Ovarian cancer Other          maternal great aunt    COPD Maternal Aunt Heydi Liu     Heart disease Brother David Abreu, III     Colon cancer Neg Hx      Suicide Neg Hx           Review of Systems   Constitutional:  Negative for activity change, fatigue and fever.   HENT:  Negative for congestion, postnasal drip, rhinorrhea, sinus pressure and sneezing.    Eyes:  Negative for discharge, redness and itching.   Respiratory:  Negative for cough, shortness of breath and wheezing.    Cardiovascular:  Negative for chest pain.   Gastrointestinal:  Negative for diarrhea, nausea and vomiting.   Genitourinary:  Negative for dysuria.   Musculoskeletal:  Negative for arthralgias and joint swelling.   Skin:  Negative for rash.   Neurological:  Negative for headaches.   Hematological:  Does not bruise/bleed easily.   Psychiatric/Behavioral:  Negative for behavioral problems and sleep disturbance.         Objective:   Physical Exam  Vitals and nursing note reviewed.   Constitutional:       General: She is not in acute distress.     Appearance: She is well-developed.   HENT:      Right Ear: External ear normal.      Left Ear: External ear normal.      Nose: No septal deviation, mucosal edema or rhinorrhea.      Right Sinus: No maxillary sinus tenderness or frontal sinus tenderness.      Left Sinus: No maxillary sinus tenderness or frontal sinus tenderness.      Mouth/Throat:      Pharynx: Uvula midline. No uvula swelling.   Eyes:      General:         Right eye: No discharge.         Left eye: No discharge.   Cardiovascular:      Rate and Rhythm: Normal rate.   Pulmonary:      Effort: Pulmonary effort is normal. No respiratory distress.   Abdominal:      General: There is no distension.    Musculoskeletal:         General: Normal range of motion.      Cervical back: Normal range of motion.   Skin:     Findings: No erythema or rash.   Neurological:      Mental Status: She is alert and oriented to person, place, and time.   Psychiatric:         Behavior: Behavior normal.           IgG   Date Value Ref Range Status   09/26/2024 900 650 - 1600 mg/dL Final     Comment:     IgG Cord Blood Reference Range: 650-1600 mg/dL.     IgM   Date Value Ref Range Status   09/26/2024 78 50 - 300 mg/dL Final     Comment:     IgM Cord Blood Reference Range: <25 mg/dL.     IgA   Date Value Ref Range Status   09/26/2024 220 40 - 350 mg/dL Final     Comment:     IgA Cord Blood Reference Range: <5 mg/dL.     Total IgE   Date Value Ref Range Status   09/26/2024 <35 0 - 100 IU/mL Final     Eos #   Date Value Ref Range Status   09/26/2024 0.1 0.0 - 0.5 K/uL Final   03/10/2022 0.1 0.0 - 0.5 K/uL Final   12/08/2020 0.0 0.0 - 0.5 K/uL Final     Eosinophil %   Date Value Ref Range Status   09/26/2024 1.8 0.0 - 8.0 % Final   03/10/2022 0.7 0.0 - 8.0 % Final   12/08/2020 0.1 0.0 - 8.0 % Final     Total IgE   Date Value Ref Range Status   09/26/2024 <35 0 - 100 IU/mL Final     Pneumococcal titers:  No results found for this or any previous visit.       Assessment:       1. Family history of scleroderma    2. Recurrent sinusitis    3. Chronic rhinitis    4. Constipation, unspecified constipation type         Plan:       Diagnoses and all orders for this visit:    Family history of scleroderma  Uncertain what dx pt's daughter has, so uncertain what to screen for. Pending definitive dx of daughter's condition, rheumatology eval may be helpful.    Recurrent sinusitis  Chronic rhinitis  -     Dermatophagoides [place]; Future  -     Dermatophagoides Pteronyssinus; Future  -     B[place]uda; Future  -     Rigo; Future  -     Shevlin; Future  -     English Plantain; Future  -     Oak; Future  -     Pecan; Future  -     Marsh Elder; Future  -      Ragweed; Future  -     Alternaria; Future  -     Aspergillus; Future  -     Cat; Future  -     Dog; Future  -     IgE; Future  -     Immunoglobulins (IgG, IgA, IgM) Quantitative; Future  -     Streptococcus Pneumoniae IgG Antibody (23 Serotypes), MAID; Future  -     CBC Auto Differential; Future    Constipation, unspecified constipation type  -     Tissue Transglutaminase, IgA; Future

## 2024-09-26 ENCOUNTER — LAB VISIT (OUTPATIENT)
Dept: LAB | Facility: OTHER | Age: 49
End: 2024-09-26
Attending: NURSE PRACTITIONER
Payer: COMMERCIAL

## 2024-09-26 DIAGNOSIS — K59.00 CONSTIPATION, UNSPECIFIED CONSTIPATION TYPE: ICD-10-CM

## 2024-09-26 DIAGNOSIS — J31.0 CHRONIC RHINITIS: ICD-10-CM

## 2024-09-26 LAB
BASOPHILS # BLD AUTO: 0.06 K/UL (ref 0–0.2)
BASOPHILS NFR BLD: 1.1 % (ref 0–1.9)
DIFFERENTIAL METHOD BLD: ABNORMAL
EOSINOPHIL # BLD AUTO: 0.1 K/UL (ref 0–0.5)
EOSINOPHIL NFR BLD: 1.8 % (ref 0–8)
ERYTHROCYTE [DISTWIDTH] IN BLOOD BY AUTOMATED COUNT: 12.4 % (ref 11.5–14.5)
HCT VFR BLD AUTO: 41.1 % (ref 37–48.5)
HGB BLD-MCNC: 13.8 G/DL (ref 12–16)
IGA SERPL-MCNC: 220 MG/DL (ref 40–350)
IGE SERPL-ACNC: <35 IU/ML (ref 0–100)
IGG SERPL-MCNC: 900 MG/DL (ref 650–1600)
IGM SERPL-MCNC: 78 MG/DL (ref 50–300)
IMM GRANULOCYTES # BLD AUTO: 0.01 K/UL (ref 0–0.04)
IMM GRANULOCYTES NFR BLD AUTO: 0.2 % (ref 0–0.5)
LYMPHOCYTES # BLD AUTO: 1.7 K/UL (ref 1–4.8)
LYMPHOCYTES NFR BLD: 31.2 % (ref 18–48)
MCH RBC QN AUTO: 29.9 PG (ref 27–31)
MCHC RBC AUTO-ENTMCNC: 33.6 G/DL (ref 32–36)
MCV RBC AUTO: 89 FL (ref 82–98)
MONOCYTES # BLD AUTO: 0.5 K/UL (ref 0.3–1)
MONOCYTES NFR BLD: 8.2 % (ref 4–15)
NEUTROPHILS # BLD AUTO: 3.2 K/UL (ref 1.8–7.7)
NEUTROPHILS NFR BLD: 57.5 % (ref 38–73)
NRBC BLD-RTO: 0 /100 WBC
PLATELET # BLD AUTO: 326 K/UL (ref 150–450)
PMV BLD AUTO: 8.6 FL (ref 9.2–12.9)
RBC # BLD AUTO: 4.62 M/UL (ref 4–5.4)
WBC # BLD AUTO: 5.52 K/UL (ref 3.9–12.7)

## 2024-09-26 PROCEDURE — 36415 COLL VENOUS BLD VENIPUNCTURE: CPT | Performed by: ALLERGY & IMMUNOLOGY

## 2024-09-26 PROCEDURE — 86003 ALLG SPEC IGE CRUDE XTRC EA: CPT | Performed by: ALLERGY & IMMUNOLOGY

## 2024-09-26 PROCEDURE — 82784 ASSAY IGA/IGD/IGG/IGM EACH: CPT | Mod: 59 | Performed by: ALLERGY & IMMUNOLOGY

## 2024-09-26 PROCEDURE — 85025 COMPLETE CBC W/AUTO DIFF WBC: CPT | Performed by: ALLERGY & IMMUNOLOGY

## 2024-09-26 PROCEDURE — 82785 ASSAY OF IGE: CPT | Performed by: ALLERGY & IMMUNOLOGY

## 2024-09-26 PROCEDURE — 86317 IMMUNOASSAY INFECTIOUS AGENT: CPT | Mod: 59 | Performed by: ALLERGY & IMMUNOLOGY

## 2024-09-26 PROCEDURE — 86003 ALLG SPEC IGE CRUDE XTRC EA: CPT | Mod: 59 | Performed by: ALLERGY & IMMUNOLOGY

## 2024-09-26 PROCEDURE — 86364 TISS TRNSGLTMNASE EA IG CLAS: CPT | Performed by: ALLERGY & IMMUNOLOGY

## 2024-09-30 LAB — TTG IGA SER-ACNC: 0.3 U/ML

## 2024-10-02 LAB
A ALTERNATA IGE QN: <0.1 KU/L
A FUMIGATUS IGE QN: <0.1 KU/L
BERMUDA GRASS IGE QN: <0.1 KU/L
CAT DANDER IGE QN: <0.1 KU/L
CEDAR IGE QN: <0.1 KU/L
D FARINAE IGE QN: <0.1 KU/L
D PTERONYSS IGE QN: <0.1 KU/L
DEPRECATED CEDAR IGE RAST QL: NORMAL
DEPRECATED TIMOTHY IGE RAST QL: NORMAL
DOG DANDER IGE QN: <0.1 KU/L
ELDER IGE QN: <0.1 KU/L
ENGL PLANTAIN IGE QN: <0.1 KU/L
PECAN/HICK TREE IGE QN: <0.1 KU/L
RAST CLASS: NORMAL
TIMOTHY IGE QN: <0.1 KU/L
WEST RAGWEED IGE QN: <0.1 KU/L
WHITE OAK IGE QN: <0.1 KU/L

## 2024-10-04 LAB
IMMUNOLOGIST REVIEW: NORMAL
S PN DA SERO 19F IGG SER-MCNC: 3.1 MCG/ML
S PNEUM DA 1 IGG SER-MCNC: 0.5 MCG/ML
S PNEUM DA 10A IGG SER-MCNC: 0.5 MCG/ML
S PNEUM DA 11A IGG SER-MCNC: 1.1 MCG/ML
S PNEUM DA 12F IGG SER-MCNC: 0.1 MCG/ML
S PNEUM DA 14 IGG SER-MCNC: 14.4 MCG/ML
S PNEUM DA 15B IGG SER-MCNC: 1.9 MCG/ML
S PNEUM DA 17F IGG SER-MCNC: 1.3 MCG/ML
S PNEUM DA 18C IGG SER-MCNC: 3 MCG/ML
S PNEUM DA 19A IGG SER-MCNC: 0.8 MCG/ML
S PNEUM DA 2 IGG SER-MCNC: 0.9 MCG/ML
S PNEUM DA 20A IGG SER-MCNC: 1 MCG/ML
S PNEUM DA 22F IGG SER-MCNC: 0.7 MCG/ML
S PNEUM DA 23F IGG SER-MCNC: 0.7 MCG/ML
S PNEUM DA 3 IGG SER-MCNC: 0.1 MCG/ML
S PNEUM DA 33F IGG SER-MCNC: 1.2 MCG/ML
S PNEUM DA 4 IGG SER-MCNC: 0.1 MCG/ML
S PNEUM DA 5 IGG SER-MCNC: 0.1 MCG/ML
S PNEUM DA 6B IGG SER-MCNC: 1.8 MCG/ML
S PNEUM DA 7F IGG SER-MCNC: 0.2 MCG/ML
S PNEUM DA 8 IGG SER-MCNC: 0.9 MCG/ML
S PNEUM DA 9N IGG SER-MCNC: 0.4 MCG/ML
S PNEUM DA 9V IGG SER-MCNC: 0.1 MCG/ML

## 2024-10-07 ENCOUNTER — TELEPHONE (OUTPATIENT)
Dept: ALLERGY | Facility: CLINIC | Age: 49
End: 2024-10-07
Payer: COMMERCIAL

## 2024-10-07 NOTE — TELEPHONE ENCOUNTER
----- Message from Otto Luna MD sent at 10/7/2024  2:49 PM CDT -----  Please call to let know that evaluation of pt's immune system showed that pt may benefit from an extra vaccine, Pneumovax, to decrease frequency of sinus infections. Please schedule follow up appointment to review labs and discuss Pneumovax vaccine.  Allergy tests are negative.

## 2024-10-22 ENCOUNTER — HOSPITAL ENCOUNTER (OUTPATIENT)
Dept: RADIOLOGY | Facility: HOSPITAL | Age: 49
Discharge: HOME | End: 2024-10-22
Attending: FAMILY MEDICINE
Payer: COMMERCIAL

## 2024-10-22 DIAGNOSIS — Z12.31 ENCOUNTER FOR SCREENING MAMMOGRAM FOR BREAST CANCER: ICD-10-CM

## 2024-10-22 PROCEDURE — 77067 SCR MAMMO BI INCL CAD: CPT | Mod: TC

## 2024-10-29 ENCOUNTER — PATIENT MESSAGE (OUTPATIENT)
Dept: INTERNAL MEDICINE | Facility: CLINIC | Age: 49
End: 2024-10-29
Payer: COMMERCIAL

## 2024-10-29 ENCOUNTER — PATIENT MESSAGE (OUTPATIENT)
Dept: ADMINISTRATIVE | Facility: HOSPITAL | Age: 49
End: 2024-10-29
Payer: COMMERCIAL

## 2024-11-05 ENCOUNTER — LAB VISIT (OUTPATIENT)
Dept: LAB | Facility: HOSPITAL | Age: 49
End: 2024-11-05
Attending: INTERNAL MEDICINE
Payer: COMMERCIAL

## 2024-11-05 ENCOUNTER — OFFICE VISIT (OUTPATIENT)
Dept: PULMONOLOGY | Facility: CLINIC | Age: 49
End: 2024-11-05
Payer: COMMERCIAL

## 2024-11-05 VITALS
WEIGHT: 140.75 LBS | HEART RATE: 84 BPM | OXYGEN SATURATION: 97 % | HEIGHT: 67 IN | BODY MASS INDEX: 22.09 KG/M2 | DIASTOLIC BLOOD PRESSURE: 68 MMHG | SYSTOLIC BLOOD PRESSURE: 95 MMHG

## 2024-11-05 DIAGNOSIS — G25.81 RESTLESS LEGS SYNDROME (RLS): Primary | ICD-10-CM

## 2024-11-05 DIAGNOSIS — G25.81 RESTLESS LEGS SYNDROME (RLS): ICD-10-CM

## 2024-11-05 DIAGNOSIS — G47.01 INSOMNIA DUE TO MEDICAL CONDITION: ICD-10-CM

## 2024-11-05 PROBLEM — G47.00 INSOMNIA: Status: ACTIVE | Noted: 2024-11-05

## 2024-11-05 LAB — FERRITIN SERPL-MCNC: 81 NG/ML (ref 20–300)

## 2024-11-05 PROCEDURE — 99205 OFFICE O/P NEW HI 60 MIN: CPT | Mod: S$GLB,,, | Performed by: INTERNAL MEDICINE

## 2024-11-05 PROCEDURE — 3078F DIAST BP <80 MM HG: CPT | Mod: CPTII,S$GLB,, | Performed by: INTERNAL MEDICINE

## 2024-11-05 PROCEDURE — 36415 COLL VENOUS BLD VENIPUNCTURE: CPT | Performed by: INTERNAL MEDICINE

## 2024-11-05 PROCEDURE — 3008F BODY MASS INDEX DOCD: CPT | Mod: CPTII,S$GLB,, | Performed by: INTERNAL MEDICINE

## 2024-11-05 PROCEDURE — 99999 PR PBB SHADOW E&M-EST. PATIENT-LVL IV: CPT | Mod: PBBFAC,,, | Performed by: INTERNAL MEDICINE

## 2024-11-05 PROCEDURE — 3074F SYST BP LT 130 MM HG: CPT | Mod: CPTII,S$GLB,, | Performed by: INTERNAL MEDICINE

## 2024-11-05 PROCEDURE — 82728 ASSAY OF FERRITIN: CPT | Performed by: INTERNAL MEDICINE

## 2024-11-05 PROCEDURE — 1159F MED LIST DOCD IN RCRD: CPT | Mod: CPTII,S$GLB,, | Performed by: INTERNAL MEDICINE

## 2024-11-05 RX ORDER — GABAPENTIN 300 MG/1
300 CAPSULE ORAL NIGHTLY
Qty: 30 CAPSULE | Refills: 2 | Status: SHIPPED | OUTPATIENT
Start: 2024-11-05 | End: 2025-11-05

## 2024-11-05 NOTE — PATIENT INSTRUCTIONS
Stimulus control  1. Go to bed only when sleepy AND after 10 pm  2. Do not watch television, read, eat, or worry while in bed. Use bed only for sleep and sex.   3. Get out of bed if unable to fall asleep within twenty minutes and go to another room.  Do something different like reading a book.  Dont engage in stimulating activity.  Return to bed only when you are sleepy.  Repeat this step as many times as necessary throughout the night.   4. Set an alarm clock to wake up at a fixed time each morning including weekends.  Wake up at 4 AM.  5. Do not take a nap during the day.          [facility]

## 2024-11-05 NOTE — PROGRESS NOTES
Lynne Velasco  was seen as a new patient at the request of  Jf Kuo for the evaluation of  apnea.    CHIEF COMPLAINT:    Chief Complaint   Patient presents with    Apnea       HISTORY OF PRESENT ILLNESS: Lynne Velasco is a 49 y.o. female is here for sleep evaluation.   Patient with chronic insomnia and was prescribed amitriptyline 50 mg x several years.  Still have difficulty with sleep initiation and maintenance.  Amitriptyline was not effective with insomnia.  +sleep walking with ambien.  Was also prescribed ativan but have not used in a while.        STOPBANG during initial visit:  Snore:  mild snoring  Tire:  tire   Observed apnea:  denied  Pressure (HTN):  denied  BMI:  Body mass index is 22.05 kg/m².   Age:  49 y.o.  Neck (inch):  13.5  Gender:  female    Other sleep ROS:    +frequent tossing and turning.  Used to sleep walk but improved with arrangements.  +sleep talking.  No cataplexy.    Restlessness in legs.  Mainly at night when sleeping.  Frequent leg movement.  Improve with activities.      [place] Sleepiness Scale score during initial sleep evaluation was 6.    SLEEP ROUTINE:  Activity the hour prior to sleep: watch tv in bed     Bed partner:    Time to bed:  8:30 pm   Lights off:  tv on until asleep  Sleep onset latency:  watch tv until asleep ~2 hours         Disruptions or awakenings:    2-3 times (can have difficulty going back to sleep)    Wakeup time:      4 am   Perceived sleep quality:  tire       Daytime naps:      denied  Weekend sleep routine:      8:30 pm till 6:30 am   Caffeine use: 1-2 cups in am   exercise habit:   weights and cardio 5 days per week      PAST MEDICAL HISTORY:    Active Ambulatory Problems     Diagnosis Date Noted    Musculoskeletal disorder involving upper trapezius muscle 09/04/2020    Anxiety     Uterine leiomyoma 12/07/2020    Myalgia 02/26/2021    Lack of coordination 02/26/2021    Scar tissue 02/26/2021    Insomnia  11/05/2024    Restless legs syndrome (RLS) 11/05/2024     Resolved Ambulatory Problems     Diagnosis Date Noted    Major depression, recurrent 04/30/2013    Generalized anxiety disorder 04/30/2013    Excessive or frequent menstruation 07/11/2014    Cervical strain 05/20/2016    Impaired range of motion of cervical spine 09/04/2020    Chronic tension-type headache, intractable 09/04/2020    Rectocele 12/07/2020    Midline cystocele 12/07/2020     Past Medical History:   Diagnosis Date    Abnormal Pap smear 1993    Depression     History of psychiatric care     Mental disorder     Neuromuscular disorder     Psychiatric problem     Therapy                 PAST SURGICAL HISTORY:    Past Surgical History:   Procedure Laterality Date    AUGMENTATION OF BREAST      saline, 2010    Breast implants 1998 1998    BREAST SURGERY  1999, 2010    implants    COSMETIC SURGERY  1999, 2010    breast implants    Cryo 1993      dental implant      DILATION AND CURETTAGE OF UTERUS      DILATION AND CURETTAGE OF UTERUS      ENDOMETRIAL ABLATION  July 11,2014    Hysteroscopy with Novasure endometrial ablation    EYE SURGERY      lasik    HYSTERECTOMY      ROBOT-ASSISTED LAPAROSCOPIC HYSTERECTOMY N/A 12/7/2020    Procedure: ROBOTIC HYSTERECTOMY;  Surgeon: Juliane Mcdonnell MD;  Location: [facility] OR;  Service: OB/GYN;  Laterality: N/A;    ROBOT-ASSISTED SURGICAL REMOVAL OF FALLOPIAN TUBE USING DA NAV XI Bilateral 12/7/2020    Procedure: XI ROBOTIC SALPINGECTOMY;  Surgeon: Juliane Mcdonnell MD;  Location: [facility] OR;  Service: OB/GYN;  Laterality: Bilateral;         FAMILY HISTORY:                Family History   Problem Relation Name Age of Onset    Diabetes Father David Abreu Jr.     Stroke Father David Abreu Jr.     Kidney disease Father David Abreu Jr.     Diabetes Mother Jessi Hilario     Hypertension Mother Jessi Hilario     Coronary artery disease Mother Jessi Hilario     Depression Mother Jessi Hilario     Seizures Mother Jessi Hilario      Arthritis Mother Jessi Hilario     Heart disease Mother Jessi Hilario     Breast cancer Maternal Aunt Salomon Betts     Cancer Maternal Aunt Salomon Betts     Miscarriages / Stillbirths Maternal Aunt Salomno Betts     Breast cancer Maternal Aunt      Mental illness Paternal Grandmother Amarilis Abreu     Mental illness Maternal Grandfather Cathy     Breast cancer Paternal Aunt      Ovarian cancer Other          maternal great aunt    COPD Maternal Aunt Heydi Liu     Heart disease Brother David Abreu, III     Colon cancer Neg Hx      Suicide Neg Hx         SOCIAL HISTORY:          Tobacco:   Social History     Tobacco Use   Smoking Status Former    Current packs/day: 0.00    Types: Cigarettes    Quit date: 10/15/1996    Years since quittin.0   Smokeless Tobacco Never       alcohol use:    Social History     Substance and Sexual Activity   Alcohol Use Yes    Alcohol/week: 4.0 standard drinks of alcohol    Types: 2 Glasses of wine, 2 Standard drinks or equivalent per week    Comment: social                 Occupation:      ALLERGIES:    Review of patient's allergies indicates:   Allergen Reactions    Wool Hives    Effexor [venlafaxine] Palpitations       CURRENT MEDICATIONS:    Current Outpatient Medications   Medication Sig Dispense Refill    amitriptyline (ELAVIL) 50 MG tablet TAKE 1 TABLET EVERY NIGHT AS NEEDED FOR INSOMNIA 90 tablet 3    azithromycin (Z-CARINA) 250 MG tablet Take by mouth.      brompheniramine-pseudoeph-DM (BROMFED DM) 2-30-10 mg/5 mL Syrp Take 5 mLs by mouth every 6 (six) hours as needed.      dutasteride (AVODART) 0.5 mg capsule Take 0.5 mg by mouth.      EScitalopram oxalate (LEXAPRO) 10 MG tablet Take 1 tablet (10 mg total) by mouth once daily. 90 tablet 3    fluticasone propionate (FLONASE) 50 mcg/actuation nasal spray SMARTSI Both Nares Daily      LORazepam (ATIVAN) 0.5 MG tablet lorazepam 0.5 mg tablet      minoxidiL (LONITEN) 10 MG Tab Take 2.5 mg by mouth once daily. Half  "pill daily      minoxidiL (LONITEN) 2.5 MG tablet Take 2.5 mg by mouth.      pantoprazole (PROTONIX) 40 MG tablet Take 1 tablet (40 mg total) by mouth once daily. 90 tablet 3    rizatriptan (MAXALT-MLT) 10 MG disintegrating tablet DISSOLVE 1 TABLET ON THE TONGUE  AT THE ONSET OF HEADACHES; IF  SYMPTOMS PERSIST OR RETURN, MAY  REPEAT DOSE AFTER 2 HOURS.  MAXIMUM DOSE: 20 MG PER 24 HOURS (Patient taking differently: DISSOLVE 1 TABLET ON THE TONGUE  AT THE ONSET OF HEADACHES; IF  SYMPTOMS PERSIST OR RETURN, MAY  REPEAT DOSE AFTER 2 HOURS.  MAXIMUM DOSE: 20 MG PER 24 HOURS) 16 tablet 5    spironolactone (ALDACTONE) 50 MG tablet Take 50 mg by mouth once daily. Pt taking 2 tablets daily  5    gabapentin (NEURONTIN) 300 MG capsule Take 1 capsule (300 mg total) by mouth every evening. 30 capsule 2     No current facility-administered medications for this visit.                  REVIEW OF SYSTEMS:     Sleep related symptoms as per HPI.  CONST:Denies weight gain    HEENT: Denies sinus congestion  PULM: Denies dyspnea  CARD:  Denies palpitations   GI:  Denies acid reflux; h/o esophageal stricture s/p dilation in 2024  : Denies polyuria  NEURO: frequent headaches  PSYCH: Denies mood disturbance  HEME: Denies anemia   Otherwise, a balance of systems reviewed is negative.          PHYSICAL EXAM:  Vitals:    11/05/24 1412   BP: 95/68   Pulse: 84   SpO2: 97%   Weight: 63.9 kg (140 lb 12.2 oz)   Height: 5' 7" (1.702 m)   PainSc: 0-No pain     Body mass index is 22.05 kg/m².     GENERAL: Normal development, well groomed  HEENT:  Conjunctivae are non-erythematous; Pupils equal, round, and reactive to light; Nose is symmetrical; Nasal mucosa is pink and moist; Septum is midline; Inferior turbinates are normal; Nasal airflow is normal; Posterior pharynx is pink; Modified Mallampati: 2; Posterior palate is normal; Tonsils +1; Uvula is normal and pink;Tongue is normal; Dentition is fair; No TMJ tenderness; Jaw opening and protrusion " without click and without discomfort.  NECK: Supple. Neck circumference is 13.5 inches. No thyromegaly. No palpable nodes.     SKIN: On face and neck: No abrasions, no rashes, no lesions.  No subcutaneous nodules are palpable.  RESPIRATORY: Chest is clear to auscultation.  Normal chest expansion and non-labored breathing at rest.  CARDIOVASCULAR: Normal S1, S2.  No murmurs, gallops or rubs. No carotid bruits bilaterally.  EXTREMITIES: No edema. No clubbing. No cyanosis. Station normal. Gait normal.        NEURO/PSYCH: Oriented to time, place and person. Normal attention span and concentration. Affect is full. Mood is normal.                                              DATA no prior sleep study    Lab Results   Component Value Date    TSH 1.721 03/10/2022       ASSESSMENT/PLAN    Problem List Items Addressed This Visit       Insomnia    Overview     maintenance is the issue.    Untreated rls +/- psychophysiologic.    Stimulus control and sleep restriction d/w patient.  Sleep time from 10 to 4 am.  Advise patient to download insomnia  to log sleep diary  May be candidate for cbt-I with Dr. Metzger         Restless legs syndrome (RLS) - Primary    Overview     4/4 criteria.  Given restless sleep will start a trial of gabapentin.  Recommend stopping elavil.           Relevant Medications    gabapentin (NEURONTIN) 300 MG capsule    Other Relevant Orders    Ferritin (Completed)     Nasal congestion - mouth breather due to deviated septum.        Precautions: The patient was advised to abstain from driving should they feel sleepy or drowsy.       Thank you for allowing me the opportunity to participate in the care of your patient.    Patient will No follow-ups on file.    Please cc note to  Jf Kuo*.    60 minutes of total time spent on the encounter, which includes face to face time and non-face to face time preparing to see the patient (eg, review of tests), Obtaining and/or reviewing separately  obtained history, documenting clinical information in the electronic or other health record, independently interpreting results (not separately reported) and communicating results to the patient/family/caregiver, or Care coordination (not separately reported).

## 2024-12-18 ENCOUNTER — OFFICE VISIT (OUTPATIENT)
Dept: PAIN MEDICINE | Facility: CLINIC | Age: 49
End: 2024-12-18
Payer: COMMERCIAL

## 2024-12-18 ENCOUNTER — TELEPHONE (OUTPATIENT)
Dept: PAIN MEDICINE | Facility: CLINIC | Age: 49
End: 2024-12-18
Payer: COMMERCIAL

## 2024-12-18 VITALS
WEIGHT: 140.88 LBS | OXYGEN SATURATION: 100 % | TEMPERATURE: 98 F | SYSTOLIC BLOOD PRESSURE: 107 MMHG | BODY MASS INDEX: 22.06 KG/M2 | DIASTOLIC BLOOD PRESSURE: 71 MMHG | HEART RATE: 95 BPM | RESPIRATION RATE: 17 BRPM

## 2024-12-18 DIAGNOSIS — G43.709 CHRONIC MIGRAINE WITHOUT AURA WITHOUT STATUS MIGRAINOSUS, NOT INTRACTABLE: Primary | ICD-10-CM

## 2024-12-18 PROCEDURE — 99999 PR PBB SHADOW E&M-EST. PATIENT-LVL III: CPT | Mod: PBBFAC,,, | Performed by: ANESTHESIOLOGY

## 2024-12-18 PROCEDURE — 99499 UNLISTED E&M SERVICE: CPT | Mod: S$GLB,,, | Performed by: ANESTHESIOLOGY

## 2024-12-18 NOTE — PROGRESS NOTES
Patient Name: Lynne Velasco  MRN: [id number]    INFORMED CONSENT: The procedure, risks, benefits and options were discussed with patient. There are no contraindications to the procedure. The patient expressed understanding and agreed to proceed. The personnel performing the procedure was discussed. I verify that I personally obtained Lynne's consent prior to the start of the procedure and the signed consent can be found on the patient's chart.    Procedure Date: 12/18/2024    Anesthesia: Topical    Sedation: None    Pre-operative diagnosis: chronic migraines  Post-operative diagnosis: Same       Procedure: Chemical neurolysis   Risks and benefits were explained including bleeding, infection, worsening of pain, damage to the areas being injected, weakness of muscles, loss of muscle control, dysphagia if injecting the head or neck, facial droop if injecting the facial area, painful injection, allergic or other reaction to the medications being injected, and the failure of the procedure to help the problem. A signed consent was obtained.     The patient was placed in a comfortable area and the sites to be treated were identified. A time out was performed. The area to be treated was prepped three times with alcohol and the alcohol allowed to dry. The muscles were flexed and marked. A 30G 1/2 inch needle was used to inject the medication into the muscles described below.     Injection sites: 31   muscle bilaterally (a total of 10 units divided into 2 sites)  Procerus muscle (a total of 5 units at 1 site)  Frontalis muscle bilaterally (a total of 20 units divided into 4 sites)  Temporalis muscle bilaterally (a total of 40 units divided into 8 sites)   Occipitalis muscle bilaterally (a total of 40 units divided into 8 sites)  Cervical paraspinal muscles (a total of 20 units divided into 4 sites)   Trapezius muscle bilaterally (a total of 30 units divided into 6 sites)     Total Botox used: 155  Units   Botox wasted: 45 Units     Lot: [id number]  Exp: 2027/03    Lot: [id number]  Exp: 2026/10    Complications: None  Disposition: Pt tolerated the procedure well and was monitored for 10 minutes afterwards prior to discharge.   RTC for the next Botox injection: 3 months     Alison Menchaca MD  [facility] PGY2

## 2025-01-07 ENCOUNTER — OFFICE VISIT (OUTPATIENT)
Dept: INTERNAL MEDICINE | Facility: CLINIC | Age: 50
End: 2025-01-07
Attending: FAMILY MEDICINE
Payer: COMMERCIAL

## 2025-01-07 ENCOUNTER — LAB VISIT (OUTPATIENT)
Dept: LAB | Facility: OTHER | Age: 50
End: 2025-01-07
Attending: FAMILY MEDICINE
Payer: COMMERCIAL

## 2025-01-07 VITALS
DIASTOLIC BLOOD PRESSURE: 60 MMHG | SYSTOLIC BLOOD PRESSURE: 100 MMHG | OXYGEN SATURATION: 99 % | HEIGHT: 67 IN | BODY MASS INDEX: 21.8 KG/M2 | HEART RATE: 100 BPM | WEIGHT: 138.88 LBS

## 2025-01-07 DIAGNOSIS — Z00.00 PREVENTATIVE HEALTH CARE: Primary | ICD-10-CM

## 2025-01-07 DIAGNOSIS — Z00.00 PREVENTATIVE HEALTH CARE: ICD-10-CM

## 2025-01-07 DIAGNOSIS — Z13.39 ADHD (ATTENTION DEFICIT HYPERACTIVITY DISORDER) EVALUATION: ICD-10-CM

## 2025-01-07 DIAGNOSIS — G47.00 INSOMNIA, UNSPECIFIED TYPE: ICD-10-CM

## 2025-01-07 DIAGNOSIS — M62.89 PELVIC FLOOR TENSION: ICD-10-CM

## 2025-01-07 DIAGNOSIS — F32.A DEPRESSION, UNSPECIFIED DEPRESSION TYPE: ICD-10-CM

## 2025-01-07 LAB
ALBUMIN SERPL BCP-MCNC: 4.2 G/DL (ref 3.5–5.2)
ALP SERPL-CCNC: 36 U/L (ref 40–150)
ALT SERPL W/O P-5'-P-CCNC: 13 U/L (ref 10–44)
ANION GAP SERPL CALC-SCNC: 8 MMOL/L (ref 8–16)
AST SERPL-CCNC: 16 U/L (ref 10–40)
BILIRUB SERPL-MCNC: 0.3 MG/DL (ref 0.1–1)
BUN SERPL-MCNC: 10 MG/DL (ref 6–20)
CALCIUM SERPL-MCNC: 9.6 MG/DL (ref 8.7–10.5)
CHLORIDE SERPL-SCNC: 105 MMOL/L (ref 95–110)
CHOLEST SERPL-MCNC: 155 MG/DL (ref 120–199)
CHOLEST/HDLC SERPL: 3 {RATIO} (ref 2–5)
CO2 SERPL-SCNC: 26 MMOL/L (ref 23–29)
CREAT SERPL-MCNC: 0.9 MG/DL (ref 0.5–1.4)
EST. GFR  (NO RACE VARIABLE): >60 ML/MIN/1.73 M^2
ESTIMATED AVG GLUCOSE: 91 MG/DL (ref 68–131)
GLUCOSE SERPL-MCNC: 84 MG/DL (ref 70–110)
HBA1C MFR BLD: 4.8 % (ref 4–5.6)
HDLC SERPL-MCNC: 51 MG/DL (ref 40–75)
HDLC SERPL: 32.9 % (ref 20–50)
LDLC SERPL CALC-MCNC: 90.6 MG/DL (ref 63–159)
NONHDLC SERPL-MCNC: 104 MG/DL
POTASSIUM SERPL-SCNC: 4.3 MMOL/L (ref 3.5–5.1)
PROT SERPL-MCNC: 6.8 G/DL (ref 6–8.4)
SODIUM SERPL-SCNC: 139 MMOL/L (ref 136–145)
TRIGL SERPL-MCNC: 67 MG/DL (ref 30–150)

## 2025-01-07 PROCEDURE — 36415 COLL VENOUS BLD VENIPUNCTURE: CPT | Performed by: FAMILY MEDICINE

## 2025-01-07 PROCEDURE — 80061 LIPID PANEL: CPT | Performed by: FAMILY MEDICINE

## 2025-01-07 PROCEDURE — 99999 PR PBB SHADOW E&M-EST. PATIENT-LVL III: CPT | Mod: PBBFAC,,, | Performed by: FAMILY MEDICINE

## 2025-01-07 PROCEDURE — 99396 PREV VISIT EST AGE 40-64: CPT | Mod: S$GLB,,, | Performed by: FAMILY MEDICINE

## 2025-01-07 PROCEDURE — 83036 HEMOGLOBIN GLYCOSYLATED A1C: CPT | Performed by: FAMILY MEDICINE

## 2025-01-07 PROCEDURE — 80053 COMPREHEN METABOLIC PANEL: CPT | Performed by: FAMILY MEDICINE

## 2025-01-07 RX ORDER — AMITRIPTYLINE HYDROCHLORIDE 50 MG/1
TABLET, FILM COATED ORAL
Qty: 90 TABLET | Refills: 3 | Status: SHIPPED | OUTPATIENT
Start: 2025-01-07

## 2025-01-07 RX ORDER — ESCITALOPRAM OXALATE 10 MG/1
10 TABLET ORAL DAILY
Qty: 90 TABLET | Refills: 3 | Status: SHIPPED | OUTPATIENT
Start: 2025-01-07

## 2025-01-07 NOTE — PROGRESS NOTES
"CHIEF COMPLAINT:   Annual    HISTORY OF PRESENT ILLNESS: The patient is a very pleasant 49-year-old white female.  She presents for annual.  She is doing well overall.      REVIEW OF SYSTEMS:  GENERAL: No fever, chills, fatigability or weight loss.  SKIN: No rashes, itching or changes in color or texture of skin.  HEAD: The patient does not have headaches and recent head trauma.  EYES: Visual acuity fine. No photophobia, ocular pain or diplopia.  EARS: Denies ear pain, discharge or vertigo.  NOSE: No loss of smell, no epistaxis or postnasal drip.  MOUTH & THROAT: No hoarseness or change in voice. No excessive gum bleeding.  NODES: Denies swollen glands.  CHEST: Denies ROJO, cyanosis, wheezing, cough and sputum production.  CARDIOVASCULAR: Denies chest pain, PND, orthopnea or reduced exercise tolerance.  ABDOMEN: Appetite decreased. No weight loss. Denies diarrhea, hematemesis or blood in stool.    URINARY: No flank pain or hematuria.  PERIPHERAL VASCULAR: No claudication or cyanosis.  MUSCULOSKELETAL: No joint stiffness or swelling. Denies back pain.  NEUROLOGIC: No history of seizures, paralysis, alteration of gait or coordination.    SOCIAL HISTORY:  She does not smoke.  She consumes alcohol socially.  She is .    PHYSICAL EXAMINATION:   Blood pressure 100/60, pulse 100, height 5' 7" (1.702 m), weight 63 kg (138 lb 14.2 oz), last menstrual period 01/22/2019, SpO2 99%.  APPEARANCE: Well nourished, well developed, in no acute distress.    HEAD: Normocephalic, atraumatic.  EYES: PERRL. EOMI.  Conjunctivae without injection and  anicteric  NOSE: Mucosa pink. Airway clear.  MOUTH & THROAT: No tonsillar enlargement. No pharyngeal erythema or exudate. No stridor.  NECK: Supple.   NODES: No cervical, axillary or inguinal lymph node enlargement.  ABDOMEN: Bowel sounds normal. Not distended. Soft. No guarding or rebound tenderness or masses.  No ascites is noted.  MUSCULOSKELETAL:  There is no clubbing, cyanosis, or " edema of the extremities x4.  There is full range of motion of the lumbar spine.  There is full range of motion of the extremities x4.  There is no deformity noted.    NEUROLOGIC:       Dev Coma Scale 15      Normal speech development.      Hearing normal.      Normal gait.      Motor and sensory exams grossly normal.  PSYCHIATRIC: Patient is alert and oriented x3.  Thought processes are all normal.  There is no homicidality.  There is no suicidality.  There is no evidence of psychosis.    LABORATORY/RADIOLOGY:Chart reviewed.    ASSESSMENT:   Annual  Depression and anxiety, resolved  Insomnia, mild    PLAN:   We will follow-up blood work which we expect to be normal.  Return to clinic in a year

## 2025-01-26 ENCOUNTER — PATIENT MESSAGE (OUTPATIENT)
Dept: URGENT CARE | Facility: CLINIC | Age: 50
End: 2025-01-26

## 2025-01-26 ENCOUNTER — OFFICE VISIT (OUTPATIENT)
Dept: URGENT CARE | Facility: CLINIC | Age: 50
End: 2025-01-26
Payer: COMMERCIAL

## 2025-01-26 VITALS
DIASTOLIC BLOOD PRESSURE: 79 MMHG | WEIGHT: 138 LBS | OXYGEN SATURATION: 100 % | HEIGHT: 67 IN | BODY MASS INDEX: 21.66 KG/M2 | HEART RATE: 125 BPM | SYSTOLIC BLOOD PRESSURE: 116 MMHG | TEMPERATURE: 101 F | RESPIRATION RATE: 16 BRPM

## 2025-01-26 DIAGNOSIS — R50.9 FEVER IN ADULT: ICD-10-CM

## 2025-01-26 DIAGNOSIS — J06.9 VIRAL URI WITH COUGH: Primary | ICD-10-CM

## 2025-01-26 DIAGNOSIS — J04.0 VIRAL LARYNGITIS: ICD-10-CM

## 2025-01-26 DIAGNOSIS — B97.89 VIRAL LARYNGITIS: ICD-10-CM

## 2025-01-26 LAB
CTP QC/QA: YES
CTP QC/QA: YES
POC MOLECULAR INFLUENZA A AGN: NEGATIVE
POC MOLECULAR INFLUENZA B AGN: NEGATIVE
SARS-COV-2 AG RESP QL IA.RAPID: NEGATIVE

## 2025-01-26 PROCEDURE — 96372 THER/PROPH/DIAG INJ SC/IM: CPT | Mod: S$GLB,,, | Performed by: NURSE PRACTITIONER

## 2025-01-26 PROCEDURE — 87811 SARS-COV-2 COVID19 W/OPTIC: CPT | Mod: QW,S$GLB,, | Performed by: NURSE PRACTITIONER

## 2025-01-26 PROCEDURE — 99203 OFFICE O/P NEW LOW 30 MIN: CPT | Mod: 25,S$GLB,, | Performed by: NURSE PRACTITIONER

## 2025-01-26 PROCEDURE — 87502 INFLUENZA DNA AMP PROBE: CPT | Mod: QW,S$GLB,, | Performed by: NURSE PRACTITIONER

## 2025-01-26 RX ORDER — KETOROLAC TROMETHAMINE 30 MG/ML
30 INJECTION, SOLUTION INTRAMUSCULAR; INTRAVENOUS
Status: COMPLETED | OUTPATIENT
Start: 2025-01-26 | End: 2025-01-26

## 2025-01-26 RX ORDER — BENZONATATE 200 MG/1
200 CAPSULE ORAL EVERY 8 HOURS PRN
Qty: 30 CAPSULE | Refills: 0 | Status: SHIPPED | OUTPATIENT
Start: 2025-01-26

## 2025-01-26 RX ORDER — PROMETHAZINE HYDROCHLORIDE AND DEXTROMETHORPHAN HYDROBROMIDE 6.25; 15 MG/5ML; MG/5ML
5 SYRUP ORAL EVERY 8 HOURS PRN
Qty: 118 ML | Refills: 0 | Status: SHIPPED | OUTPATIENT
Start: 2025-01-26

## 2025-01-26 RX ADMIN — KETOROLAC TROMETHAMINE 30 MG: 30 INJECTION, SOLUTION INTRAMUSCULAR; INTRAVENOUS at 01:01

## 2025-01-26 NOTE — PROGRESS NOTES
"Subjective:      Patient ID: Lynne Velasco is a 49 y.o. female.    Vitals:  height is 5' 7" (1.702 m) and weight is 62.6 kg (138 lb). Her oral temperature is 100.8 °F (38.2 °C) (abnormal). Her blood pressure is 116/79 and her pulse is 125 (abnormal). Her respiration is 16 and oxygen saturation is 100%.     Chief Complaint: Cough (Entered by patient)    This is a 49 y.o. female who presents today with a chief complaint of cough, ear pain, fever, fatigue and malaise, loss of voice, sore throat, and chills. Symptoms began three days ago. Denies chest pain and wheeze. Taking mucinex for symptoms.       Cough  This is a new problem. The current episode started in the past 7 days. The problem has been gradually worsening. The problem occurs every few minutes. The cough is Productive of sputum. Associated symptoms include chills, ear pain, a fever, headaches, myalgias, nasal congestion, postnasal drip, a sore throat and shortness of breath. Pertinent negatives include no wheezing. Associated symptoms comments: Neck pain, weakness. laryngitis. She has tried OTC cough suppressant (mucinex) for the symptoms. The treatment provided mild relief. There is no history of asthma, bronchiectasis, bronchitis, COPD, emphysema, environmental allergies or pneumonia.       Constitution: Positive for chills, fatigue, fever and generalized weakness.   HENT:  Positive for ear pain, congestion, postnasal drip, sore throat and voice change.    Respiratory:  Positive for chest tightness, cough and shortness of breath. Negative for wheezing.    Gastrointestinal:  Negative for vomiting and diarrhea.   Musculoskeletal:  Positive for muscle ache.   Allergic/Immunologic: Negative for environmental allergies.   Neurological:  Positive for headaches.      Objective:     Physical Exam   Constitutional: She is oriented to person, place, and time. She appears well-developed. She is cooperative.  Non-toxic appearance. She appears ill. No " distress.   HENT:   Head: Normocephalic.   Ears:   Right Ear: Hearing, tympanic membrane, external ear and ear canal normal.   Left Ear: Hearing, tympanic membrane, external ear and ear canal normal.   Nose: Congestion present. No mucosal edema, rhinorrhea or nasal deformity. No epistaxis. Right sinus exhibits no maxillary sinus tenderness and no frontal sinus tenderness. Left sinus exhibits no maxillary sinus tenderness and no frontal sinus tenderness.   Mouth/Throat: Uvula is midline and mucous membranes are normal. Mucous membranes are moist. No trismus in the jaw. Normal dentition. No uvula swelling. Posterior oropharyngeal erythema present. No oropharyngeal exudate or posterior oropharyngeal edema. Oropharynx is clear.   Eyes: Conjunctivae and lids are normal. No scleral icterus.   Neck: Trachea normal and phonation normal. Neck supple. No edema present. No erythema present. No neck rigidity present.   Cardiovascular: Regular rhythm. Tachycardia present.      Comments: Tachy with fever    Pulmonary/Chest: Effort normal and breath sounds normal. No respiratory distress. She has no decreased breath sounds. She has no wheezes. She has no rhonchi.   Musculoskeletal: Normal range of motion.         General: No deformity. Normal range of motion.   Lymphadenopathy:     She has no cervical adenopathy.   Neurological: She is alert and oriented to person, place, and time. She exhibits normal muscle tone. Coordination normal.   Skin: Skin is warm, dry, intact, not diaphoretic and not pale.   Psychiatric: Her speech is normal and behavior is normal. Judgment and thought content normal.   Nursing note and vitals reviewed.    Results for orders placed or performed in visit on 01/26/25   POCT Influenza A/B MOLECULAR    Collection Time: 01/26/25 12:39 PM   Result Value Ref Range    POC Molecular Influenza A Ag Negative Negative    POC Molecular Influenza B Ag Negative Negative     Acceptable Yes    SARS  Coronavirus 2 Antigen, POCT Manual Read    Collection Time: 01/26/25 12:39 PM   Result Value Ref Range    SARS Coronavirus 2 Antigen Negative Negative     Acceptable Yes         Assessment:     1. Viral URI with cough    2. Viral laryngitis    3. Fever in adult        Plan:       Viral URI with cough  -     POCT Influenza A/B MOLECULAR  -     SARS Coronavirus 2 Antigen, POCT Manual Read  -     benzonatate (TESSALON) 200 MG capsule; Take 1 capsule (200 mg total) by mouth every 8 (eight) hours as needed for Cough.  Dispense: 30 capsule; Refill: 0  -     promethazine-dextromethorphan (PROMETHAZINE-DM) 6.25-15 mg/5 mL Syrp; Take 5 mLs by mouth every 8 (eight) hours as needed (cough).  Dispense: 118 mL; Refill: 0    Viral laryngitis  -     POCT Influenza A/B MOLECULAR  -     SARS Coronavirus 2 Antigen, POCT Manual Read  -     ketorolac injection 30 mg    Fever in adult  -     POCT Influenza A/B MOLECULAR  -     SARS Coronavirus 2 Antigen, POCT Manual Read  -     ketorolac injection 30 mg    Other orders  -     (Magic mouthwash) 1:1:1 diphenhydrAMINE(Benadryl) 12.5mg/5ml liq, aluminum & magnesium hydroxide-simethicone (Maalox), LIDOcaine viscous 2%; Swish and spit 15 mLs every 4 (four) hours as needed (throat pain). for mouth sores  Dispense: 250 mL; Refill: 0      Patient Instructions   Oral fluids  Hot tea with honey   Throat or cough lozenges  Ibuprofen or tylenol for any aches or fever   Rest  Steam from hot showers to help open upper airway  Blow nose often  Avoid circulating air (such as ceiling fans) dries your airway  Avoid drinking cold drinks (worsens cough)  Avoid strong smells which could worsen cough (perfume, lotions, smoke...)  You can also try a humidifier  Therapeutic coughing to expel mucous  Sit in upright position often  Follow up with any worsening symptoms; and seek ER care with any wheezing, retractions, or respiratory distress; lethargy; dehydration...

## 2025-01-26 NOTE — LETTER
January 26, 2025      [facility] [place]  [address]  [facility] [address]  Phone: [phone]  Fax: [phone]       Patient: Lynne Velasco   YOB: 1975  Date of Visit: 01/26/2025    To Whom It May Concern:    Hayde Velasco  was at [facility] on 01/26/2025. The patient may return to work/school on 1/29/2025 with no restrictions. If you have any questions or concerns, or if I can be of further assistance, please do not hesitate to contact me.    Sincerely,    Carmen Barrios, NADER-BC

## 2025-03-28 ENCOUNTER — TELEPHONE (OUTPATIENT)
Dept: PAIN MEDICINE | Facility: CLINIC | Age: 50
End: 2025-03-28

## 2025-03-28 NOTE — TELEPHONE ENCOUNTER
----- Message from Monique sent at 3/28/2025  8:22 AM CDT -----  Regarding: missed call   Who Called: Lynne Who Left Message for Patient: Zeina Does the patient know what this is regarding? Yes Best Call Back Number: [phone] Additional Information:

## 2025-03-28 NOTE — TELEPHONE ENCOUNTER
"Left a voicemail, informing patient that her BOTOX 200 UNITS appointment on 04/04/25 has NOT been approved as of 03/28/25. Encouraged patient to call her insurance company or speak with [facility] before next Friday.    [employer] message sent:    "  Ms. Velasco,  Good morning. I left a voicemail earlier today. Your insurance has not yet approved your Botox injections, scheduled on Friday, April 4, 2025. You should call them as soon as you can before your appointment. You can also call the [facility] department at either [phone] or [phone].  Thank you,  GIANLUCA Delarosa LPN     "    "

## 2025-04-01 ENCOUNTER — TELEPHONE (OUTPATIENT)
Dept: PAIN MEDICINE | Facility: CLINIC | Age: 50
End: 2025-04-01
Payer: COMMERCIAL

## 2025-04-01 NOTE — TELEPHONE ENCOUNTER
"States she uploaded recent insurance documents. Unable to open document in Media tab but do it labeled as [employer] documents on 01/29/25. Informed patient I would send a [employer] message once I can verify the documents. Patient expressed understanding and gratitude for phone call.  Call ended.      "  Theo Patel MA Medical Assistant Signed3/28/2025     Delete     Copy     Pt says she missed a call .            Theo Patel MA Medical Assistant Signed3/28/2025     Delete     Copy     ----- Message from Monique sent at 3/28/2025  8:22 AM CDT -----  Regarding: missed call   Who Called: Lynne Who Left Message for Patient: Zeina Does the patient know what this is regarding? Yes Best Call Back Number:        [phone] Additional Information:         "  "

## 2025-04-03 ENCOUNTER — TELEPHONE (OUTPATIENT)
Dept: PAIN MEDICINE | Facility: CLINIC | Age: 50
End: 2025-04-03
Payer: COMMERCIAL

## 2025-04-03 NOTE — TELEPHONE ENCOUNTER
----- Message from Ky Roman sent at 4/3/2025  9:40 AM CDT -----  Hey, Patient has botox scheduled on 4/10. She does not have insurance coverage in the system. She needs to be contacted to obtain insurance information and have that entered into her chart. Ms. Portillo can help with that. If she has new coverage we need to reschedule the visit and update preservice. If she does not have coverage, she will need to contact central pricing for self pay or financial assistance. Thanks!

## 2025-04-09 ENCOUNTER — PATIENT MESSAGE (OUTPATIENT)
Dept: PAIN MEDICINE | Facility: CLINIC | Age: 50
End: 2025-04-09
Payer: COMMERCIAL

## 2025-04-22 DIAGNOSIS — G25.81 RESTLESS LEGS SYNDROME (RLS): ICD-10-CM

## 2025-04-22 DIAGNOSIS — G47.00 INSOMNIA, UNSPECIFIED TYPE: ICD-10-CM

## 2025-04-22 DIAGNOSIS — G43.119 INTRACTABLE MIGRAINE WITH AURA WITHOUT STATUS MIGRAINOSUS: ICD-10-CM

## 2025-04-22 DIAGNOSIS — F32.A DEPRESSION, UNSPECIFIED DEPRESSION TYPE: ICD-10-CM

## 2025-04-22 DIAGNOSIS — R51.9 WORSENING HEADACHES: ICD-10-CM

## 2025-04-22 DIAGNOSIS — M62.89 PELVIC FLOOR TENSION: ICD-10-CM

## 2025-04-22 RX ORDER — AMITRIPTYLINE HYDROCHLORIDE 50 MG/1
TABLET, FILM COATED ORAL
Qty: 90 TABLET | Refills: 3 | Status: SHIPPED | OUTPATIENT
Start: 2025-04-22

## 2025-04-22 RX ORDER — ESCITALOPRAM OXALATE 10 MG/1
10 TABLET ORAL DAILY
Qty: 90 TABLET | Refills: 3 | Status: SHIPPED | OUTPATIENT
Start: 2025-04-22

## 2025-04-22 NOTE — TELEPHONE ENCOUNTER
Refill Encounter    PCP Visits: Recent Visits  Date Type Provider Dept   01/07/25 Office Visit Jf Kuo MD [facility] Internal Medicine   Showing recent visits within past 360 days and meeting all other requirements  Future Appointments  No visits were found meeting these conditions.  Showing future appointments within next 720 days and meeting all other requirements      Last 3 Blood Pressure:   BP Readings from Last 3 Encounters:   01/26/25 116/79   01/07/25 100/60   12/18/24 107/71     Preferred Pharmacy:   [id number] [place] [facility] [address]  [facility] [place] [address]  Phone: [phone] Fax: [phone]    Requested RX:  Requested Prescriptions     Pending Prescriptions Disp Refills    EScitalopram oxalate (LEXAPRO) 10 MG tablet 90 tablet 3     Sig: Take 1 tablet (10 mg total) by mouth once daily.    amitriptyline (ELAVIL) 50 MG tablet 90 tablet 3     Sig: TAKE 1 TABLET EVERY NIGHT AS NEEDED FOR INSOMNIA      RX Route: Normal

## 2025-04-22 NOTE — TELEPHONE ENCOUNTER
No care due was identified.  [facility] Embedded Care Due Messages. Reference number: [id number].   4/22/2025 3:43:01 PM CDT

## 2025-04-23 RX ORDER — RIZATRIPTAN BENZOATE 10 MG/1
TABLET, ORALLY DISINTEGRATING ORAL
Qty: 16 TABLET | Refills: 1 | Status: SHIPPED | OUTPATIENT
Start: 2025-04-23

## 2025-04-23 RX ORDER — GABAPENTIN 300 MG/1
300 CAPSULE ORAL NIGHTLY
Qty: 30 CAPSULE | Refills: 2 | Status: SHIPPED | OUTPATIENT
Start: 2025-04-23 | End: 2026-04-23

## 2025-04-25 ENCOUNTER — OFFICE VISIT (OUTPATIENT)
Dept: PAIN MEDICINE | Facility: CLINIC | Age: 50
End: 2025-04-25
Payer: COMMERCIAL

## 2025-04-25 ENCOUNTER — TELEPHONE (OUTPATIENT)
Dept: PAIN MEDICINE | Facility: CLINIC | Age: 50
End: 2025-04-25
Payer: COMMERCIAL

## 2025-04-25 VITALS
SYSTOLIC BLOOD PRESSURE: 116 MMHG | WEIGHT: 138 LBS | BODY MASS INDEX: 21.62 KG/M2 | OXYGEN SATURATION: 98 % | RESPIRATION RATE: 18 BRPM | DIASTOLIC BLOOD PRESSURE: 79 MMHG | HEART RATE: 98 BPM | TEMPERATURE: 99 F

## 2025-04-25 DIAGNOSIS — G43.709 CHRONIC MIGRAINE WITHOUT AURA WITHOUT STATUS MIGRAINOSUS, NOT INTRACTABLE: Primary | ICD-10-CM

## 2025-04-25 PROCEDURE — 99999 PR PBB SHADOW E&M-EST. PATIENT-LVL IV: CPT | Mod: PBBFAC,,, | Performed by: ANESTHESIOLOGY

## 2025-04-25 NOTE — PROGRESS NOTES
Patient is here today for botox procedure.  She has a new insurance, and we have not received authorization for botox injection, will be rescheduled.

## 2025-05-05 ENCOUNTER — TELEPHONE (OUTPATIENT)
Dept: PAIN MEDICINE | Facility: CLINIC | Age: 50
End: 2025-05-05
Payer: COMMERCIAL

## 2025-05-05 NOTE — TELEPHONE ENCOUNTER
----- Message from Monique sent at 5/2/2025  4:08 PM CDT -----  Regarding: insurance information  Name of Who is Calling: Lynne What is the request in detail: Patient is requesting a call back to give the correct information that need to be pulled up so the insurance can be found for procedure.  Can the clinic reply by [facility]: Yes What Number to Call Back if not in [facility]: [phone]

## 2025-05-05 NOTE — TELEPHONE ENCOUNTER
----- Message from Monique sent at 5/2/2025  3:35 PM CDT -----  Regarding: approval status  Name of Who is Calling: Lynne What is the request in detail: Patient is requesting a call back to find out if her insurane approved her injection.  Can the clinic reply by [facility]: Yes [facility]  [phone]

## 2025-05-06 ENCOUNTER — OFFICE VISIT (OUTPATIENT)
Dept: PAIN MEDICINE | Facility: CLINIC | Age: 50
End: 2025-05-06
Payer: COMMERCIAL

## 2025-05-06 ENCOUNTER — TELEPHONE (OUTPATIENT)
Dept: PAIN MEDICINE | Facility: CLINIC | Age: 50
End: 2025-05-06
Payer: COMMERCIAL

## 2025-05-06 VITALS
HEART RATE: 85 BPM | SYSTOLIC BLOOD PRESSURE: 99 MMHG | TEMPERATURE: 99 F | RESPIRATION RATE: 18 BRPM | DIASTOLIC BLOOD PRESSURE: 69 MMHG | WEIGHT: 136.69 LBS | OXYGEN SATURATION: 100 % | BODY MASS INDEX: 21.45 KG/M2 | HEIGHT: 67 IN

## 2025-05-06 DIAGNOSIS — G43.709 CHRONIC MIGRAINE WITHOUT AURA WITHOUT STATUS MIGRAINOSUS, NOT INTRACTABLE: Primary | ICD-10-CM

## 2025-05-06 PROCEDURE — 99999 PR PBB SHADOW E&M-EST. PATIENT-LVL V: CPT | Mod: PBBFAC,,, | Performed by: ANESTHESIOLOGY

## 2025-05-06 PROCEDURE — 99499 UNLISTED E&M SERVICE: CPT | Mod: S$GLB,,, | Performed by: ANESTHESIOLOGY

## 2025-05-06 PROCEDURE — 64615 CHEMODENERV MUSC MIGRAINE: CPT | Mod: S$GLB,,, | Performed by: ANESTHESIOLOGY

## 2025-05-06 NOTE — PROGRESS NOTES
Patient Name: Lynne Velasco  MRN: [id number]    INFORMED CONSENT: The procedure, risks, benefits and options were discussed with patient. There are no contraindications to the procedure. The patient expressed understanding and agreed to proceed. The personnel performing the procedure was discussed. I verify that I personally obtained Lynne's consent prior to the start of the procedure and the signed consent can be found on the patient's chart.    Procedure Date: 05/06/2025    Anesthesia: Topical    Sedation: None    Pre-operative diagnosis: chronic migraines  Post-operative diagnosis: Same       Procedure: Chemical neurolysis   Risks and benefits were explained including bleeding, infection, worsening of pain, damage to the areas being injected, weakness of muscles, loss of muscle control, dysphagia if injecting the head or neck, facial droop if injecting the facial area, painful injection, allergic or other reaction to the medications being injected, and the failure of the procedure to help the problem. A signed consent was obtained.     The patient was placed in a comfortable area and the sites to be treated were identified. A time out was performed. The area to be treated was prepped three times with alcohol and the alcohol allowed to dry. The muscles were flexed and marked. A 30G 1/2 inch needle was used to inject the medication into the muscles described below.     Injection sites: 31   muscle bilaterally (a total of 10 units divided into 2 sites)  Procerus muscle (a total of 5 units at 1 site)  Frontalis muscle bilaterally (a total of 20 units divided into 4 sites)  Temporalis muscle bilaterally (a total of 40 units divided into 8 sites)   Occipitalis muscle bilaterally (a total of 40 units divided into 8 sites)  Cervical paraspinal muscles (a total of 20 units divided into 4 sites)   Trapezius muscle bilaterally (a total of 30 units divided into 6 sites)     Total Botox used: 155 Units    Botox wasted: 45 Units     Lot: [id number]  Exp: 2027/06    Lot: [id number]  Exp: 2027/06    Complications: None  Disposition: Pt tolerated the procedure well and was monitored for 10 minutes afterwards prior to discharge.   RTC for the next Botox injection: 3 months     Haris Garcia MD       I reviewed and edited the resident/fellow's note. I conducted my own interview and physical examination. I agree with the findings and documentation. I was present and supervising all critical portions of the procedure.      Bhupinder Novoa MD

## 2025-08-05 ENCOUNTER — TELEPHONE (OUTPATIENT)
Dept: PAIN MEDICINE | Facility: CLINIC | Age: 50
End: 2025-08-05
Payer: COMMERCIAL

## 2025-08-05 ENCOUNTER — PATIENT MESSAGE (OUTPATIENT)
Dept: PAIN MEDICINE | Facility: CLINIC | Age: 50
End: 2025-08-05

## 2025-08-05 ENCOUNTER — OFFICE VISIT (OUTPATIENT)
Dept: PAIN MEDICINE | Facility: CLINIC | Age: 50
End: 2025-08-05
Payer: COMMERCIAL

## 2025-08-05 VITALS
HEART RATE: 97 BPM | BODY MASS INDEX: 20.52 KG/M2 | WEIGHT: 130.75 LBS | TEMPERATURE: 98 F | OXYGEN SATURATION: 97 % | DIASTOLIC BLOOD PRESSURE: 66 MMHG | RESPIRATION RATE: 16 BRPM | HEIGHT: 67 IN | SYSTOLIC BLOOD PRESSURE: 97 MMHG

## 2025-08-05 DIAGNOSIS — G43.909 MIGRAINE WITHOUT STATUS MIGRAINOSUS, NOT INTRACTABLE, UNSPECIFIED MIGRAINE TYPE: Primary | ICD-10-CM

## 2025-08-05 DIAGNOSIS — G43.709 CHRONIC MIGRAINE WITHOUT AURA WITHOUT STATUS MIGRAINOSUS, NOT INTRACTABLE: ICD-10-CM

## 2025-08-05 PROCEDURE — 99999 PR PBB SHADOW E&M-EST. PATIENT-LVL V: CPT | Mod: PBBFAC,,, | Performed by: ANESTHESIOLOGY

## 2025-08-05 NOTE — TELEPHONE ENCOUNTER
Copied from CRM #[id number]. Topic: General Inquiry - Patient Advice  >> Aug 5, 2025 11:38 AM Elmer wrote:  Type: Patient Call Back    Who called: patient    What is the request in detail: call patient to explain to her why her appointment was canceled    Can the clinic reply by [facility]?    Would the patient rather a call back or a response via [facility]? call    Best call back number: [phone]    Additional Information:

## 2025-08-05 NOTE — PROGRESS NOTES
Patient Name: Lynne Velasco  MRN: [id number]    INFORMED CONSENT: The procedure, risks, benefits and options were discussed with patient. There are no contraindications to the procedure. The patient expressed understanding and agreed to proceed. The personnel performing the procedure was discussed. I verify that I personally obtained Lynne's consent prior to the start of the procedure and the signed consent can be found on the patient's chart.    Procedure Date: 08/05/2025    Anesthesia: Topical    Sedation: None    Pre-operative diagnosis: Chronic migraine headaches   Post-operative diagnosis: Same       Procedure: Chemical neurolysis   After risks and benefits were explained including bleeding, infection, worsening of pain, damage to the areas being injected, weakness of muscles, loss of muscle control, dysphagia if injecting the head or neck, facial droop if injecting the facial area, painful injection, allergic or other reaction to the medications being injected, and the failure of the procedure to help the problem, a signed consent was obtained.   The patient was placed in a comfortable area and the sites to be treated were identified. The area to be treated was prepped three times with alcohol and the alcohol allowed to dry.  After performing time out. Next, a 30 G 1/2 gauge needle was used to inject the medication in the area to be treated. New needles were used for each location.   Area(s) injected:   Total Botox used: 155 Units   Botox wastage: 45 Units   Injection sites:    muscle bilaterally ( a total of 10 units divided into 2 sites)   Procerus muscle (5 units)   Frontalis muscle bilaterally (a total of 20 units divided into 4 sites)   Temporalis muscle bilaterally (a total of 40 units divided into 8 sites)   Occipitalis muscle bilaterally (a total of 30 units divided into 6 sites)   Cervical paraspinal muscles (a total of 20 units divided into 4 sites)   Trapezius muscle bilaterally (a  total of 30 units divided into 6 sites)   Complications: minimal bleeding, stopped < 5 mins   RTC for the next Botox injection: 3 months   Medications used: botulinum toxin- two 100 Unit vials (total 200 Units), 2-2 mL of NS used to dilute the botulinum toxin.   Botox info: 2 x 100 unit vials   [id number]  Exp: 10/2027  [id number]  Exp: 11/2027  The patient did tolerate the procedure well.     Artie Foy MD      I reviewed and edited the resident/fellow's note. I conducted my own interview and physical examination. I agree with the findings and documentation. I was present and supervising all critical portions of the procedure.      Bhupinder Novoa MD

## 2025-08-05 NOTE — TELEPHONE ENCOUNTER
Staff attempted to contact patient to inform that their scheduled procedure has not yet been approved and will need to be canceled and rescheduled. Staff left brief voicemail and a message was also sent via patient portal.

## (undated) DEVICE — DEVICE ANC SW STAT FOLEY 6-24

## (undated) DEVICE — ELECTRODE REM PLYHSV RETURN 9

## (undated) DEVICE — SOL NS 1000CC

## (undated) DEVICE — SUT VICRYL PLUS 0 CT1 36IN

## (undated) DEVICE — SUT VICRYL CTD 2-0 GI 27 SH

## (undated) DEVICE — SOL STRL WATER INJ 1000ML BG

## (undated) DEVICE — SUT MCRYL PLUS 4-0 PS2 27IN

## (undated) DEVICE — PAD PREP 50/CA

## (undated) DEVICE — TROCAR ENDOPATH XCEL 5X100MM

## (undated) DEVICE — SEE MEDLINE ITEM 157110

## (undated) DEVICE — TIP RUMI BLUE DISPOSABLE 5/BX

## (undated) DEVICE — ADHESIVE DERMABOND ADVANCED

## (undated) DEVICE — CLIPPER BLADE MOD 4406 (CAREF)

## (undated) DEVICE — SOL CLEARIFY VISUALIZATION LAP

## (undated) DEVICE — SEE MEDLINE ITEM 152622

## (undated) DEVICE — OBTURATOR BLADELESS 8MM XI CLR

## (undated) DEVICE — CAUTERY TIP POLISHER

## (undated) DEVICE — OCCLUDER COLPO-PNEUMO STERILE

## (undated) DEVICE — UNDERGLOVES BIOGEL PI SZ 7 LF

## (undated) DEVICE — DRAPE COLUMN DAVINCI XI

## (undated) DEVICE — DRAPE STERI INSTRUMENT 1018

## (undated) DEVICE — NDL HYPO REG 25G X 1 1/2

## (undated) DEVICE — GLOVE BIOGEL SKINSENSE PI 6.5

## (undated) DEVICE — SOL ELECTROLUBE ANTI-STIC

## (undated) DEVICE — SEE MEDLINE ITEM 156923

## (undated) DEVICE — SEE MEDLINE ITEM 156930

## (undated) DEVICE — INSERT CUSHIONPRONE VIEW LARGE

## (undated) DEVICE — COVER TIP CURVED SCISSORS XI

## (undated) DEVICE — KIT WING PAD POSITIONING

## (undated) DEVICE — GAUZE PACKING VAG XRAY 2X6

## (undated) DEVICE — SET CYSTO IRRIGATION UNIV SPIK

## (undated) DEVICE — SOL WATER STRL IRR 1000ML

## (undated) DEVICE — KIT URINE METER 350ML STAT LOC

## (undated) DEVICE — SET TRI-LUMEN FILTERED TUBE

## (undated) DEVICE — POSITIONER HEEL FOAM CONVOLTD

## (undated) DEVICE — IRRIGATOR ENDOSCOPY DISP.

## (undated) DEVICE — SEAL UNIVERSAL 5MM-8MM XI

## (undated) DEVICE — GLOVE BIOGEL SKINSENSE PI 7.0

## (undated) DEVICE — SYR 10CC LUER LOCK

## (undated) DEVICE — DRAPE ARM DAVINCI XI

## (undated) DEVICE — SET DECANTER MEDICHOICE

## (undated) DEVICE — UNDERGLOVE BIOGEL PI SZ 6.5 LF

## (undated) DEVICE — SUT VICRYL 0 27 CT-2

## (undated) DEVICE — SOL 9P NACL IRR PIC IL

## (undated) DEVICE — SEE MEDLINE ITEM 146347

## (undated) DEVICE — SUT 0 8-27IN VICRYL PL CT-1

## (undated) DEVICE — JELLY SURGILUBE 5GR